# Patient Record
Sex: MALE | Race: WHITE | NOT HISPANIC OR LATINO | Employment: OTHER | URBAN - METROPOLITAN AREA
[De-identification: names, ages, dates, MRNs, and addresses within clinical notes are randomized per-mention and may not be internally consistent; named-entity substitution may affect disease eponyms.]

---

## 2017-01-04 ENCOUNTER — OFFICE VISIT (OUTPATIENT)
Dept: FAMILY MEDICINE CLINIC | Facility: CLINIC | Age: 72
End: 2017-01-04

## 2017-01-04 VITALS
HEART RATE: 72 BPM | HEIGHT: 73 IN | BODY MASS INDEX: 32.74 KG/M2 | DIASTOLIC BLOOD PRESSURE: 82 MMHG | TEMPERATURE: 98.4 F | SYSTOLIC BLOOD PRESSURE: 122 MMHG | WEIGHT: 247 LBS | OXYGEN SATURATION: 98 %

## 2017-01-04 DIAGNOSIS — I10 ESSENTIAL HYPERTENSION: ICD-10-CM

## 2017-01-04 DIAGNOSIS — E78.2 MIXED HYPERLIPIDEMIA: Primary | ICD-10-CM

## 2017-01-04 DIAGNOSIS — M54.41 CHRONIC BILATERAL LOW BACK PAIN WITH RIGHT-SIDED SCIATICA: ICD-10-CM

## 2017-01-04 DIAGNOSIS — M15.9 PRIMARY OSTEOARTHRITIS INVOLVING MULTIPLE JOINTS: ICD-10-CM

## 2017-01-04 DIAGNOSIS — M10.00 IDIOPATHIC GOUT, UNSPECIFIED CHRONICITY, UNSPECIFIED SITE: ICD-10-CM

## 2017-01-04 DIAGNOSIS — G89.29 CHRONIC BILATERAL LOW BACK PAIN WITH RIGHT-SIDED SCIATICA: ICD-10-CM

## 2017-01-04 DIAGNOSIS — M25.50 ARTHRALGIA, UNSPECIFIED JOINT: ICD-10-CM

## 2017-01-04 DIAGNOSIS — M79.10 MYALGIA: ICD-10-CM

## 2017-01-04 DIAGNOSIS — G47.00 INSOMNIA, UNSPECIFIED TYPE: ICD-10-CM

## 2017-01-04 PROCEDURE — 99214 OFFICE O/P EST MOD 30 MIN: CPT | Performed by: FAMILY MEDICINE

## 2017-01-04 RX ORDER — INFLUENZA A VIRUS A/MICHIGAN/45/2015 X-275 (H1N1) ANTIGEN (FORMALDEHYDE INACTIVATED), INFLUENZA A VIRUS A/SINGAPORE/INFIMH-16-0019/2016 IVR-186 (H3N2) ANTIGEN (FORMALDEHYDE INACTIVATED), AND INFLUENZA B VIRUS B/MARYLAND/15/2016 BX-69A (A B/COLORADO/6/2017-LIKE VIRUS) ANTIGEN (FORMALDEHYDE INACTIVATED) 60; 60; 60 UG/.5ML; UG/.5ML; UG/.5ML
INJECTION, SUSPENSION INTRAMUSCULAR
Refills: 0 | COMMUNITY
Start: 2016-10-20 | End: 2021-07-15

## 2017-01-04 RX ORDER — HYDROCODONE BITARTRATE AND ACETAMINOPHEN 7.5; 325 MG/1; MG/1
1 TABLET ORAL EVERY 6 HOURS PRN
Qty: 90 TABLET | Refills: 0 | Status: SHIPPED | OUTPATIENT
Start: 2017-01-04 | End: 2017-06-26 | Stop reason: SDUPTHER

## 2017-01-04 RX ORDER — TEMAZEPAM 15 MG/1
15 CAPSULE ORAL NIGHTLY PRN
Qty: 30 CAPSULE | Refills: 2 | Status: SHIPPED | OUTPATIENT
Start: 2017-01-04 | End: 2017-06-26 | Stop reason: SDUPTHER

## 2017-01-04 RX ORDER — MESALAMINE 1.2 G/1
1 TABLET, DELAYED RELEASE ORAL 2 TIMES DAILY
Refills: 3 | COMMUNITY
Start: 2016-12-19 | End: 2023-01-18

## 2017-01-04 RX ORDER — ALLOPURINOL 300 MG/1
300 TABLET ORAL DAILY
Qty: 90 TABLET | Refills: 3 | Status: SHIPPED | OUTPATIENT
Start: 2017-01-04 | End: 2017-06-26 | Stop reason: SDUPTHER

## 2017-01-04 RX ORDER — PREDNISONE 20 MG/1
20 TABLET ORAL DAILY
Qty: 30 TABLET | Refills: 2 | Status: SHIPPED | OUTPATIENT
Start: 2017-01-04 | End: 2017-06-26 | Stop reason: SDUPTHER

## 2017-01-04 RX ORDER — OMEPRAZOLE 40 MG/1
40 CAPSULE, DELAYED RELEASE ORAL DAILY
Qty: 90 CAPSULE | Refills: 3 | Status: SHIPPED | OUTPATIENT
Start: 2017-01-04 | End: 2017-06-26 | Stop reason: SDUPTHER

## 2017-01-04 RX ORDER — LOSARTAN POTASSIUM 100 MG/1
100 TABLET ORAL DAILY
Qty: 90 TABLET | Refills: 3 | Status: SHIPPED | OUTPATIENT
Start: 2017-01-04 | End: 2017-06-26 | Stop reason: SDUPTHER

## 2017-01-04 RX ORDER — DOXAZOSIN MESYLATE 4 MG/1
4 TABLET ORAL NIGHTLY
Qty: 90 TABLET | Refills: 3 | Status: SHIPPED | OUTPATIENT
Start: 2017-01-04 | End: 2017-06-26 | Stop reason: SDUPTHER

## 2017-01-04 RX ORDER — METOPROLOL SUCCINATE 100 MG/1
100 TABLET, EXTENDED RELEASE ORAL DAILY
Qty: 90 TABLET | Refills: 3 | Status: SHIPPED | OUTPATIENT
Start: 2017-01-04 | End: 2017-06-26 | Stop reason: SDUPTHER

## 2017-01-04 NOTE — PROGRESS NOTES
Subjective   Papito Salinas is a 71 y.o. male    HPI Comments: Patient presents for 6 month checkup is related to chronic gout, hypertension, chronic back pain secondary to lumbar spondylosis, insomnia and GERD.  He is having increased joint pain with occasional swelling in various joints particularly left ankle and right elbow.  He has not noted any redness or warmth in the joint areas.  He has not been doing any significant physical activity to precipitate the joint pains.  He is due for refills on all of his medications.  I have recommended we do some blood work when he is fasting and also include an arthritis panel with his lab testing.  He has not had any other new symptoms or complaints.    Arthritis   He complains of joint swelling. Pertinent negatives include no diarrhea, fatigue or rash.   Gout   Associated symptoms include arthralgias, joint swelling and myalgias. Pertinent negatives include no chest pain, coughing, fatigue, headaches, nausea, numbness, rash, vomiting or weakness.   Joint Swelling   Associated symptoms include arthralgias, joint swelling and myalgias. Pertinent negatives include no chest pain, coughing, fatigue, headaches, nausea, numbness, rash, vomiting or weakness.   Insomnia   Associated symptoms include arthralgias, joint swelling and myalgias. Pertinent negatives include no chest pain, coughing, fatigue, headaches, nausea, numbness, rash, vomiting or weakness.   Hypertension   Pertinent negatives include no chest pain, headaches, palpitations or shortness of breath.   Back Pain   Pertinent negatives include no chest pain, headaches, numbness or weakness.       The following portions of the patient's history were reviewed and updated as appropriate: allergies, current medications, past social history and problem list    Review of Systems   Constitutional: Negative.  Negative for fatigue and unexpected weight change.   Respiratory: Negative.  Negative for cough, chest tightness and  shortness of breath.    Cardiovascular: Negative for chest pain, palpitations and leg swelling.   Gastrointestinal: Negative.  Negative for constipation, diarrhea, nausea and vomiting.   Genitourinary: Negative.    Musculoskeletal: Positive for arthralgias, arthritis, back pain, gout, joint swelling and myalgias. Negative for gait problem.   Skin: Negative for color change and rash.   Neurological: Negative for dizziness, tremors, syncope, weakness, light-headedness, numbness and headaches.   Hematological: Negative for adenopathy. Does not bruise/bleed easily.   Psychiatric/Behavioral: Positive for sleep disturbance. Negative for agitation, behavioral problems, confusion, decreased concentration, dysphoric mood and hallucinations. The patient has insomnia. The patient is not nervous/anxious and is not hyperactive.        Objective     Vitals:    01/04/17 1133   BP: 122/82   Pulse: 72   Temp: 98.4 °F (36.9 °C)   SpO2: 98%       Physical Exam   Constitutional: He is oriented to person, place, and time. He appears well-developed and well-nourished. No distress.   HENT:   Head: Normocephalic and atraumatic.   Eyes: Conjunctivae are normal. Pupils are equal, round, and reactive to light.   Neck: Normal range of motion. Neck supple. No JVD present. No thyromegaly present.   Cardiovascular: Normal rate, regular rhythm, normal heart sounds, intact distal pulses and normal pulses.    No murmur heard.  Pulmonary/Chest: Effort normal and breath sounds normal. No respiratory distress.   Abdominal: Soft. Bowel sounds are normal. There is no hepatosplenomegaly. There is no tenderness.   Musculoskeletal: He exhibits tenderness. He exhibits no edema or deformity.        Lumbar back: He exhibits decreased range of motion, tenderness, bony tenderness and pain. He exhibits no swelling, no deformity and no spasm.   Neurological: He is alert and oriented to person, place, and time. He has normal reflexes. Coordination normal.   Skin:  Skin is warm and dry. He is not diaphoretic.   Psychiatric: He has a normal mood and affect. His behavior is normal. Judgment and thought content normal. Cognition and memory are normal. He is attentive.   Nursing note and vitals reviewed.      Assessment/Plan   Problem List Items Addressed This Visit        Other    Gout    Relevant Medications    allopurinol (ZYLOPRIM) 300 MG tablet    predniSONE (DELTASONE) 20 MG tablet      Other Visit Diagnoses     Mixed hyperlipidemia    -  Primary    Relevant Orders    Comprehensive Metabolic Panel    Lipid Panel    Essential hypertension        Relevant Medications    metoprolol succinate XL (TOPROL-XL) 100 MG 24 hr tablet    losartan (COZAAR) 100 MG tablet    doxazosin (CARDURA) 4 MG tablet    Other Relevant Orders    Comprehensive Metabolic Panel    Primary osteoarthritis involving multiple joints        Chronic bilateral low back pain with right-sided sciatica        Relevant Medications    HYDROcodone-acetaminophen (NORCO) 7.5-325 MG per tablet    Myalgia        Relevant Orders    CBC & Differential    C-reactive Protein    Rheumatoid Factor    Uric Acid    Sedimentation Rate    FAYE    Arthralgia, unspecified joint        Relevant Orders    CBC & Differential    C-reactive Protein    Rheumatoid Factor    Uric Acid    Sedimentation Rate    FAYE    Insomnia, unspecified type        Relevant Medications    temazepam (RESTORIL) 15 MG capsule

## 2017-01-05 ENCOUNTER — APPOINTMENT (OUTPATIENT)
Dept: LAB | Facility: HOSPITAL | Age: 72
End: 2017-01-05

## 2017-01-05 LAB
ALBUMIN SERPL-MCNC: 4.1 G/DL (ref 3.2–4.8)
ALBUMIN/GLOB SERPL: 1.2 G/DL (ref 1.5–2.5)
ALP SERPL-CCNC: 104 U/L (ref 25–100)
ALT SERPL W P-5'-P-CCNC: 28 U/L (ref 7–40)
ANION GAP SERPL CALCULATED.3IONS-SCNC: 13 MMOL/L (ref 3–11)
ARTICHOKE IGE QN: 96 MG/DL (ref 0–130)
AST SERPL-CCNC: 29 U/L (ref 0–33)
BASOPHILS # BLD AUTO: 0.01 10*3/MM3 (ref 0–0.2)
BASOPHILS NFR BLD AUTO: 0.1 % (ref 0–1)
BILIRUB SERPL-MCNC: 0.7 MG/DL (ref 0.3–1.2)
BUN BLD-MCNC: 14 MG/DL (ref 9–23)
BUN/CREAT SERPL: 11.7 (ref 7–25)
CALCIUM SPEC-SCNC: 10.1 MG/DL (ref 8.7–10.4)
CHLORIDE SERPL-SCNC: 105 MMOL/L (ref 99–109)
CHOLEST SERPL-MCNC: 169 MG/DL (ref 0–200)
CO2 SERPL-SCNC: 26 MMOL/L (ref 20–31)
CREAT BLD-MCNC: 1.2 MG/DL (ref 0.6–1.3)
CRP SERPL-MCNC: 30.8 MG/DL (ref 0–10)
DEPRECATED RDW RBC AUTO: 42.4 FL (ref 37–54)
EOSINOPHIL # BLD AUTO: 0.18 10*3/MM3 (ref 0.1–0.3)
EOSINOPHIL NFR BLD AUTO: 2.6 % (ref 0–3)
ERYTHROCYTE [DISTWIDTH] IN BLOOD BY AUTOMATED COUNT: 12.6 % (ref 11.3–14.5)
ERYTHROCYTE [SEDIMENTATION RATE] IN BLOOD: 30 MM/HR (ref 0–20)
GFR SERPL CREATININE-BSD FRML MDRD: 60 ML/MIN/1.73
GLOBULIN UR ELPH-MCNC: 3.3 GM/DL
GLUCOSE BLD-MCNC: 98 MG/DL (ref 70–100)
HCT VFR BLD AUTO: 42.5 % (ref 38.9–50.9)
HDLC SERPL-MCNC: 35 MG/DL (ref 40–60)
HGB BLD-MCNC: 14.7 G/DL (ref 13.1–17.5)
IMM GRANULOCYTES # BLD: 0.01 10*3/MM3 (ref 0–0.03)
IMM GRANULOCYTES NFR BLD: 0.1 % (ref 0–0.6)
LYMPHOCYTES # BLD AUTO: 1.16 10*3/MM3 (ref 0.6–4.8)
LYMPHOCYTES NFR BLD AUTO: 16.7 % (ref 24–44)
MCH RBC QN AUTO: 31.9 PG (ref 27–31)
MCHC RBC AUTO-ENTMCNC: 34.6 G/DL (ref 32–36)
MCV RBC AUTO: 92.2 FL (ref 80–99)
MONOCYTES # BLD AUTO: 0.55 10*3/MM3 (ref 0–1)
MONOCYTES NFR BLD AUTO: 7.9 % (ref 0–12)
NEUTROPHILS # BLD AUTO: 5.03 10*3/MM3 (ref 1.5–8.3)
NEUTROPHILS NFR BLD AUTO: 72.6 % (ref 41–71)
PLATELET # BLD AUTO: 180 10*3/MM3 (ref 150–450)
PMV BLD AUTO: 10.8 FL (ref 6–12)
POTASSIUM BLD-SCNC: 4.9 MMOL/L (ref 3.5–5.5)
PROT SERPL-MCNC: 7.4 G/DL (ref 5.7–8.2)
RBC # BLD AUTO: 4.61 10*6/MM3 (ref 4.2–5.76)
RHEUMATOID FACT SERPL-ACNC: NEGATIVE [IU]/ML
SODIUM BLD-SCNC: 144 MMOL/L (ref 132–146)
TRIGL SERPL-MCNC: 175 MG/DL (ref 0–150)
URATE SERPL-MCNC: 4.1 MG/DL (ref 3.7–9.2)
WBC NRBC COR # BLD: 6.94 10*3/MM3 (ref 3.5–10.8)

## 2017-01-05 PROCEDURE — 84550 ASSAY OF BLOOD/URIC ACID: CPT | Performed by: FAMILY MEDICINE

## 2017-01-05 PROCEDURE — 86140 C-REACTIVE PROTEIN: CPT | Performed by: FAMILY MEDICINE

## 2017-01-05 PROCEDURE — 86431 RHEUMATOID FACTOR QUANT: CPT | Performed by: FAMILY MEDICINE

## 2017-01-05 PROCEDURE — 80061 LIPID PANEL: CPT | Performed by: FAMILY MEDICINE

## 2017-01-05 PROCEDURE — 86038 ANTINUCLEAR ANTIBODIES: CPT | Performed by: FAMILY MEDICINE

## 2017-01-05 PROCEDURE — 85025 COMPLETE CBC W/AUTO DIFF WBC: CPT | Performed by: FAMILY MEDICINE

## 2017-01-05 PROCEDURE — 36415 COLL VENOUS BLD VENIPUNCTURE: CPT | Performed by: FAMILY MEDICINE

## 2017-01-05 PROCEDURE — 80053 COMPREHEN METABOLIC PANEL: CPT | Performed by: FAMILY MEDICINE

## 2017-01-05 PROCEDURE — 85652 RBC SED RATE AUTOMATED: CPT | Performed by: FAMILY MEDICINE

## 2017-01-06 LAB — ANA SER QL: NEGATIVE

## 2017-06-26 ENCOUNTER — OFFICE VISIT (OUTPATIENT)
Dept: FAMILY MEDICINE CLINIC | Facility: CLINIC | Age: 72
End: 2017-06-26

## 2017-06-26 VITALS
HEART RATE: 69 BPM | HEIGHT: 73 IN | TEMPERATURE: 98.2 F | DIASTOLIC BLOOD PRESSURE: 78 MMHG | WEIGHT: 250 LBS | BODY MASS INDEX: 33.13 KG/M2 | OXYGEN SATURATION: 98 % | SYSTOLIC BLOOD PRESSURE: 130 MMHG

## 2017-06-26 DIAGNOSIS — M54.41 CHRONIC BILATERAL LOW BACK PAIN WITH RIGHT-SIDED SCIATICA: Primary | ICD-10-CM

## 2017-06-26 DIAGNOSIS — G47.00 INSOMNIA, UNSPECIFIED TYPE: ICD-10-CM

## 2017-06-26 DIAGNOSIS — Z12.5 PROSTATE CANCER SCREENING: ICD-10-CM

## 2017-06-26 DIAGNOSIS — I10 ESSENTIAL HYPERTENSION: ICD-10-CM

## 2017-06-26 DIAGNOSIS — M15.9 GENERALIZED OSTEOARTHROSIS, INVOLVING MULTIPLE SITES: ICD-10-CM

## 2017-06-26 DIAGNOSIS — Z11.59 NEED FOR HEPATITIS C SCREENING TEST: ICD-10-CM

## 2017-06-26 DIAGNOSIS — G89.29 CHRONIC BILATERAL LOW BACK PAIN WITH RIGHT-SIDED SCIATICA: Primary | ICD-10-CM

## 2017-06-26 DIAGNOSIS — Z51.81 MEDICATION MONITORING ENCOUNTER: ICD-10-CM

## 2017-06-26 DIAGNOSIS — E78.2 MIXED HYPERLIPIDEMIA: ICD-10-CM

## 2017-06-26 DIAGNOSIS — M10.00 IDIOPATHIC GOUT, UNSPECIFIED CHRONICITY, UNSPECIFIED SITE: ICD-10-CM

## 2017-06-26 PROCEDURE — 99214 OFFICE O/P EST MOD 30 MIN: CPT | Performed by: FAMILY MEDICINE

## 2017-06-26 RX ORDER — PREDNISONE 20 MG/1
20 TABLET ORAL DAILY
Qty: 30 TABLET | Refills: 2 | Status: SHIPPED | OUTPATIENT
Start: 2017-06-26 | End: 2018-01-04 | Stop reason: SDUPTHER

## 2017-06-26 RX ORDER — OMEPRAZOLE 40 MG/1
40 CAPSULE, DELAYED RELEASE ORAL DAILY
Qty: 90 CAPSULE | Refills: 3 | Status: SHIPPED | OUTPATIENT
Start: 2017-06-26 | End: 2018-06-11 | Stop reason: SDUPTHER

## 2017-06-26 RX ORDER — ALLOPURINOL 300 MG/1
300 TABLET ORAL DAILY
Qty: 90 TABLET | Refills: 3 | Status: SHIPPED | OUTPATIENT
Start: 2017-06-26 | End: 2018-06-11 | Stop reason: SDUPTHER

## 2017-06-26 RX ORDER — LOSARTAN POTASSIUM 100 MG/1
100 TABLET ORAL DAILY
Qty: 90 TABLET | Refills: 3 | Status: SHIPPED | OUTPATIENT
Start: 2017-06-26 | End: 2018-02-05 | Stop reason: SDUPTHER

## 2017-06-26 RX ORDER — TEMAZEPAM 15 MG/1
15 CAPSULE ORAL NIGHTLY PRN
Qty: 30 CAPSULE | Refills: 2 | Status: SHIPPED | OUTPATIENT
Start: 2017-06-26 | End: 2018-01-04 | Stop reason: SDUPTHER

## 2017-06-26 RX ORDER — DOXAZOSIN MESYLATE 4 MG/1
4 TABLET ORAL NIGHTLY
Qty: 90 TABLET | Refills: 3 | Status: SHIPPED | OUTPATIENT
Start: 2017-06-26 | End: 2018-06-11 | Stop reason: SDUPTHER

## 2017-06-26 RX ORDER — METOPROLOL SUCCINATE 100 MG/1
100 TABLET, EXTENDED RELEASE ORAL DAILY
Qty: 90 TABLET | Refills: 3 | Status: SHIPPED | OUTPATIENT
Start: 2017-06-26 | End: 2018-06-11 | Stop reason: SDUPTHER

## 2017-06-26 RX ORDER — HYDROCODONE BITARTRATE AND ACETAMINOPHEN 7.5; 325 MG/1; MG/1
1 TABLET ORAL EVERY 6 HOURS PRN
Qty: 90 TABLET | Refills: 0 | Status: SHIPPED | OUTPATIENT
Start: 2017-06-26 | End: 2018-01-04 | Stop reason: SDUPTHER

## 2017-06-26 RX ORDER — DICLOFENAC SODIUM 75 MG/1
75 TABLET, DELAYED RELEASE ORAL 2 TIMES DAILY
Qty: 60 TABLET | Refills: 11 | Status: SHIPPED | OUTPATIENT
Start: 2017-06-26 | End: 2018-01-04

## 2017-06-26 NOTE — PROGRESS NOTES
Subjective   Papito Salinas is a 71 y.o. male    HPI Comments: Patient presents for 6 month recheck regarding hypertension, osteoarthritis, gout, GERD, insomnia and BPH.  He reports significant flareup of his arthritis particularly in his right knee and lower back.  He uses periodic prednisone.  We discussed adding a regular dose of NSAID to see if this will help.  He is advised to watch for GI side effects.  He is not fasting today for labs and is getting ready to go on vacation so we will do them in the future.  We discussed health maintenance issues including shingles vaccine, flu vaccine and pneumonia vaccine.  I've given him a written prescription for the shingles vaccine to get it is pharmacy and we can do his flu and pneumonia this fall.  Other than his arthritis symptoms of knee pain and back pain he has been doing well and has no other acute complaints.  He does need all of his prescriptions refilled.    Hypertension   Pertinent negatives include no chest pain, headaches, palpitations or shortness of breath.   Knee Pain    Pertinent negatives include no numbness.   Back Pain   Pertinent negatives include no abdominal pain, chest pain, dysuria, fever, headaches, numbness or weakness.       The following portions of the patient's history were reviewed and updated as appropriate: allergies, current medications, past social history and problem list    Review of Systems   Constitutional: Negative.  Negative for appetite change, chills, fatigue, fever and unexpected weight change.   Respiratory: Negative.  Negative for cough, chest tightness, shortness of breath and wheezing.    Cardiovascular: Negative for chest pain, palpitations and leg swelling.   Gastrointestinal: Negative.  Negative for abdominal distention, abdominal pain, diarrhea, nausea and vomiting.        Patient experiencing heartburn/acid reflux     Endocrine: Negative for polydipsia, polyphagia and polyuria.   Genitourinary: Negative.  Negative  for dysuria, frequency and urgency.   Musculoskeletal: Positive for back pain. Negative for arthralgias, gait problem and myalgias.   Skin: Negative for color change and rash.   Neurological: Negative for dizziness, tremors, syncope, weakness, numbness and headaches.   Hematological: Negative for adenopathy. Does not bruise/bleed easily.       Objective     Vitals:    06/26/17 1134   BP: 130/78   Pulse: 69   Temp: 98.2 °F (36.8 °C)   SpO2: 98%       Physical Exam   Constitutional: He is oriented to person, place, and time. He appears well-developed and well-nourished.   HENT:   Head: Normocephalic.   Mouth/Throat: Oropharynx is clear and moist.   Eyes: Conjunctivae are normal. Pupils are equal, round, and reactive to light.   Neck: Neck supple. No JVD present. No thyromegaly present.   Cardiovascular: Normal rate, regular rhythm, normal heart sounds, intact distal pulses and normal pulses.    No murmur heard.  Pulmonary/Chest: Effort normal and breath sounds normal. No respiratory distress.   Abdominal: Soft. Bowel sounds are normal. There is no hepatosplenomegaly. There is no tenderness.   Musculoskeletal: He exhibits no edema.        Lumbar back: He exhibits decreased range of motion, tenderness, bony tenderness and pain. He exhibits no swelling, no deformity and no spasm.   Lymphadenopathy:     He has no cervical adenopathy.   Neurological: He is alert and oriented to person, place, and time. He has normal reflexes.   Skin: Skin is warm and dry. No rash noted.   Psychiatric: He has a normal mood and affect. His behavior is normal.   Nursing note and vitals reviewed.      Assessment/Plan   Problem List Items Addressed This Visit        Musculoskeletal and Integument    Generalized osteoarthrosis, involving multiple sites       Other    Gout    Relevant Medications    predniSONE (DELTASONE) 20 MG tablet    allopurinol (ZYLOPRIM) 300 MG tablet    Other Relevant Orders    CBC (No Diff)      Other Visit Diagnoses      Chronic bilateral low back pain with right-sided sciatica    -  Primary    Relevant Medications    HYDROcodone-acetaminophen (NORCO) 7.5-325 MG per tablet    Insomnia, unspecified type        Relevant Medications    temazepam (RESTORIL) 15 MG capsule    Essential hypertension        Relevant Medications    losartan (COZAAR) 100 MG tablet    doxazosin (CARDURA) 4 MG tablet    metoprolol succinate XL (TOPROL-XL) 100 MG 24 hr tablet    Other Relevant Orders    Comprehensive Metabolic Panel    Prostate cancer screening        Relevant Orders    PSA Screen    Need for hepatitis C screening test        Relevant Orders    Hepatitis C Antibody    Mixed hyperlipidemia        Relevant Orders    Comprehensive Metabolic Panel    Lipid Panel    Medication monitoring encounter        Relevant Orders    CBC (No Diff)

## 2017-08-14 ENCOUNTER — LAB (OUTPATIENT)
Dept: LAB | Facility: HOSPITAL | Age: 72
End: 2017-08-14

## 2017-08-14 DIAGNOSIS — E78.2 MIXED HYPERLIPIDEMIA: ICD-10-CM

## 2017-08-14 DIAGNOSIS — Z11.59 NEED FOR HEPATITIS C SCREENING TEST: ICD-10-CM

## 2017-08-14 DIAGNOSIS — Z12.5 PROSTATE CANCER SCREENING: ICD-10-CM

## 2017-08-14 DIAGNOSIS — Z51.81 MEDICATION MONITORING ENCOUNTER: ICD-10-CM

## 2017-08-14 DIAGNOSIS — I10 ESSENTIAL HYPERTENSION: ICD-10-CM

## 2017-08-14 DIAGNOSIS — M10.00 IDIOPATHIC GOUT, UNSPECIFIED CHRONICITY, UNSPECIFIED SITE: ICD-10-CM

## 2017-08-14 LAB
ALBUMIN SERPL-MCNC: 3.8 G/DL (ref 3.2–4.8)
ALBUMIN/GLOB SERPL: 1.5 G/DL (ref 1.5–2.5)
ALP SERPL-CCNC: 77 U/L (ref 25–100)
ALT SERPL W P-5'-P-CCNC: 33 U/L (ref 7–40)
ANION GAP SERPL CALCULATED.3IONS-SCNC: 6 MMOL/L (ref 3–11)
ARTICHOKE IGE QN: 104 MG/DL (ref 0–130)
AST SERPL-CCNC: 25 U/L (ref 0–33)
BILIRUB SERPL-MCNC: 0.6 MG/DL (ref 0.3–1.2)
BUN BLD-MCNC: 20 MG/DL (ref 9–23)
BUN/CREAT SERPL: 18.2 (ref 7–25)
CALCIUM SPEC-SCNC: 9.1 MG/DL (ref 8.7–10.4)
CHLORIDE SERPL-SCNC: 106 MMOL/L (ref 99–109)
CHOLEST SERPL-MCNC: 183 MG/DL (ref 0–200)
CO2 SERPL-SCNC: 30 MMOL/L (ref 20–31)
CREAT BLD-MCNC: 1.1 MG/DL (ref 0.6–1.3)
DEPRECATED RDW RBC AUTO: 47.2 FL (ref 37–54)
ERYTHROCYTE [DISTWIDTH] IN BLOOD BY AUTOMATED COUNT: 13.7 % (ref 11.3–14.5)
GFR SERPL CREATININE-BSD FRML MDRD: 66 ML/MIN/1.73
GLOBULIN UR ELPH-MCNC: 2.6 GM/DL
GLUCOSE BLD-MCNC: 93 MG/DL (ref 70–100)
HCT VFR BLD AUTO: 43.2 % (ref 38.9–50.9)
HCV AB SER DONR QL: REACTIVE
HDLC SERPL-MCNC: 44 MG/DL (ref 40–60)
HGB BLD-MCNC: 14.1 G/DL (ref 13.1–17.5)
MCH RBC QN AUTO: 31.1 PG (ref 27–31)
MCHC RBC AUTO-ENTMCNC: 32.6 G/DL (ref 32–36)
MCV RBC AUTO: 95.2 FL (ref 80–99)
PLATELET # BLD AUTO: 161 10*3/MM3 (ref 150–450)
PMV BLD AUTO: 11.1 FL (ref 6–12)
POTASSIUM BLD-SCNC: 4.5 MMOL/L (ref 3.5–5.5)
PROT SERPL-MCNC: 6.4 G/DL (ref 5.7–8.2)
PSA SERPL-MCNC: 0.92 NG/ML (ref 0–4)
RBC # BLD AUTO: 4.54 10*6/MM3 (ref 4.2–5.76)
SODIUM BLD-SCNC: 142 MMOL/L (ref 132–146)
TRIGL SERPL-MCNC: 228 MG/DL (ref 0–150)
WBC NRBC COR # BLD: 8.28 10*3/MM3 (ref 3.5–10.8)

## 2017-08-14 PROCEDURE — 80061 LIPID PANEL: CPT | Performed by: FAMILY MEDICINE

## 2017-08-14 PROCEDURE — 85027 COMPLETE CBC AUTOMATED: CPT | Performed by: FAMILY MEDICINE

## 2017-08-14 PROCEDURE — 36415 COLL VENOUS BLD VENIPUNCTURE: CPT

## 2017-08-14 PROCEDURE — 87522 HEPATITIS C REVRS TRNSCRPJ: CPT | Performed by: FAMILY MEDICINE

## 2017-08-14 PROCEDURE — 86803 HEPATITIS C AB TEST: CPT | Performed by: FAMILY MEDICINE

## 2017-08-14 PROCEDURE — G0103 PSA SCREENING: HCPCS | Performed by: FAMILY MEDICINE

## 2017-08-14 PROCEDURE — 80053 COMPREHEN METABOLIC PANEL: CPT | Performed by: FAMILY MEDICINE

## 2017-08-16 LAB
HCV RNA SERPL NAA+PROBE-ACNC: NORMAL IU/ML
TEST INFORMATION: NORMAL

## 2018-01-04 ENCOUNTER — OFFICE VISIT (OUTPATIENT)
Dept: FAMILY MEDICINE CLINIC | Facility: CLINIC | Age: 73
End: 2018-01-04

## 2018-01-04 VITALS
OXYGEN SATURATION: 98 % | TEMPERATURE: 98 F | HEART RATE: 83 BPM | DIASTOLIC BLOOD PRESSURE: 80 MMHG | WEIGHT: 250 LBS | SYSTOLIC BLOOD PRESSURE: 120 MMHG | HEIGHT: 73 IN | BODY MASS INDEX: 33.13 KG/M2

## 2018-01-04 DIAGNOSIS — M10.00 IDIOPATHIC GOUT, UNSPECIFIED CHRONICITY, UNSPECIFIED SITE: ICD-10-CM

## 2018-01-04 DIAGNOSIS — R20.2 NUMBNESS AND TINGLING OF BOTH FEET: ICD-10-CM

## 2018-01-04 DIAGNOSIS — M54.41 CHRONIC BILATERAL LOW BACK PAIN WITH RIGHT-SIDED SCIATICA: Primary | ICD-10-CM

## 2018-01-04 DIAGNOSIS — G47.00 INSOMNIA, UNSPECIFIED TYPE: ICD-10-CM

## 2018-01-04 DIAGNOSIS — R20.0 NUMBNESS AND TINGLING OF BOTH FEET: ICD-10-CM

## 2018-01-04 DIAGNOSIS — J01.00 ACUTE NON-RECURRENT MAXILLARY SINUSITIS: ICD-10-CM

## 2018-01-04 DIAGNOSIS — G89.29 CHRONIC BILATERAL LOW BACK PAIN WITH RIGHT-SIDED SCIATICA: Primary | ICD-10-CM

## 2018-01-04 DIAGNOSIS — M15.9 GENERALIZED OSTEOARTHROSIS, INVOLVING MULTIPLE SITES: ICD-10-CM

## 2018-01-04 PROCEDURE — 99214 OFFICE O/P EST MOD 30 MIN: CPT | Performed by: FAMILY MEDICINE

## 2018-01-04 RX ORDER — HYDROCODONE BITARTRATE AND ACETAMINOPHEN 7.5; 325 MG/1; MG/1
1 TABLET ORAL EVERY 6 HOURS PRN
Qty: 90 TABLET | Refills: 0 | Status: SHIPPED | OUTPATIENT
Start: 2018-01-04 | End: 2018-06-11 | Stop reason: SDUPTHER

## 2018-01-04 RX ORDER — TEMAZEPAM 15 MG/1
15 CAPSULE ORAL NIGHTLY PRN
Qty: 30 CAPSULE | Refills: 5 | Status: SHIPPED | OUTPATIENT
Start: 2018-01-04 | End: 2018-06-11 | Stop reason: SDUPTHER

## 2018-01-04 RX ORDER — GABAPENTIN 100 MG/1
CAPSULE ORAL
Qty: 60 CAPSULE | Refills: 5 | Status: SHIPPED | OUTPATIENT
Start: 2018-01-04 | End: 2018-06-11 | Stop reason: SDUPTHER

## 2018-01-04 RX ORDER — PREDNISONE 20 MG/1
20 TABLET ORAL DAILY
Qty: 30 TABLET | Refills: 2 | Status: SHIPPED | OUTPATIENT
Start: 2018-01-04 | End: 2019-01-29 | Stop reason: SDUPTHER

## 2018-01-04 RX ORDER — AZITHROMYCIN 250 MG/1
TABLET, FILM COATED ORAL
Qty: 6 TABLET | Refills: 0 | Status: SHIPPED | OUTPATIENT
Start: 2018-01-04 | End: 2019-01-11

## 2018-01-04 NOTE — PROGRESS NOTES
Subjective   Papito Salinas is a 72 y.o. male    HPI Comments: Patient presents for recheck regarding chronic back pain, osteoarthritis of the knees and insomnia.  He is due for refills of his medications.  Tejinder is reviewed and is appropriate.  He has a new complaint of worsening numbness in both feet that is intermittent but has become more frequent.  He is also complaining of nasal congestion with rhinorrhea.  Initially his drainage was clear it is now dark yellow and he is also coughing up some yellow sputum.  He denies fever, chills, myalgias or arthralgias.  He had lab work last summer.  Trial of NSAIDs for his arthritis caused a flare in his colitis.  He is to avoid NSAIDs in the future.    Back Pain   Associated symptoms include numbness. Pertinent negatives include no chest pain, fever, headaches or weakness.   Knee Pain    Associated symptoms include numbness.   Cough   Associated symptoms include ear pain, postnasal drip and rhinorrhea. Pertinent negatives include no chest pain, chills, fever, headaches, myalgias, rash, sore throat or shortness of breath.   Insomnia   Associated symptoms include arthralgias, congestion, coughing and numbness. Pertinent negatives include no chest pain, chills, fatigue, fever, headaches, myalgias, nausea, rash, sore throat or weakness.       The following portions of the patient's history were reviewed and updated as appropriate: allergies, current medications, past social history and problem list    Review of Systems   Constitutional: Negative.  Negative for chills, fatigue, fever and unexpected weight change.   HENT: Positive for congestion, ear pain, postnasal drip, rhinorrhea and sinus pressure. Negative for sore throat.    Eyes: Positive for pain.   Respiratory: Positive for cough. Negative for chest tightness and shortness of breath.    Cardiovascular: Negative for chest pain, palpitations and leg swelling.   Gastrointestinal: Negative.  Negative for nausea.    Genitourinary: Negative.    Musculoskeletal: Positive for arthralgias and back pain. Negative for gait problem and myalgias.   Skin: Negative for color change and rash.   Neurological: Positive for numbness. Negative for dizziness, tremors, syncope, weakness and headaches.   Hematological: Negative for adenopathy. Does not bruise/bleed easily.   Psychiatric/Behavioral: Negative for behavioral problems and dysphoric mood. The patient has insomnia. The patient is not nervous/anxious.        Objective     Vitals:    01/04/18 1101   BP: 120/80   Pulse: 83   Temp: 98 °F (36.7 °C)   SpO2: 98%       Physical Exam   Constitutional: He is oriented to person, place, and time. He appears well-developed and well-nourished.   HENT:   Head: Normocephalic and atraumatic.   Right Ear: Tympanic membrane and ear canal normal.   Left Ear: Tympanic membrane and ear canal normal.   Nose: Mucosal edema, rhinorrhea and sinus tenderness present. Right sinus exhibits maxillary sinus tenderness and frontal sinus tenderness. Left sinus exhibits maxillary sinus tenderness and frontal sinus tenderness.   Mouth/Throat: Oropharynx is clear and moist. No oropharyngeal exudate.   Eyes: Pupils are equal, round, and reactive to light.   Neck: No JVD present.   Cardiovascular: Normal rate, regular rhythm, normal heart sounds, intact distal pulses and normal pulses.    No murmur heard.  Pulmonary/Chest: Effort normal and breath sounds normal. No respiratory distress.   Abdominal: Soft. Bowel sounds are normal. There is no hepatosplenomegaly. There is no tenderness.   Musculoskeletal: He exhibits tenderness. He exhibits no edema or deformity.        Lumbar back: He exhibits decreased range of motion, tenderness, bony tenderness and pain. He exhibits no swelling, no deformity and no spasm.   Neurological: He is alert and oriented to person, place, and time. He has normal reflexes.   Skin: Skin is warm and dry.   Psychiatric: He has a normal mood and  affect. His behavior is normal.   Nursing note and vitals reviewed.      Assessment/Plan   Problem List Items Addressed This Visit        Musculoskeletal and Integument    Generalized osteoarthrosis, involving multiple sites    Relevant Medications    HYDROcodone-acetaminophen (NORCO) 7.5-325 MG per tablet       Other    Gout    Relevant Medications    predniSONE (DELTASONE) 20 MG tablet      Other Visit Diagnoses     Chronic bilateral low back pain with right-sided sciatica    -  Primary    Relevant Medications    HYDROcodone-acetaminophen (NORCO) 7.5-325 MG per tablet    predniSONE (DELTASONE) 20 MG tablet    Insomnia, unspecified type        Relevant Medications    temazepam (RESTORIL) 15 MG capsule    Acute non-recurrent maxillary sinusitis        Relevant Medications    azithromycin (ZITHROMAX Z-HAO) 250 MG tablet    Numbness and tingling of both feet        Relevant Medications    gabapentin (NEURONTIN) 100 MG capsule

## 2018-02-05 ENCOUNTER — TELEPHONE (OUTPATIENT)
Dept: FAMILY MEDICINE CLINIC | Facility: CLINIC | Age: 73
End: 2018-02-05

## 2018-02-05 DIAGNOSIS — I10 ESSENTIAL HYPERTENSION: ICD-10-CM

## 2018-02-05 RX ORDER — LOSARTAN POTASSIUM 100 MG/1
100 TABLET ORAL DAILY
Qty: 90 TABLET | Refills: 3 | Status: SHIPPED | OUTPATIENT
Start: 2018-02-05 | End: 2018-06-11 | Stop reason: SDUPTHER

## 2018-02-05 RX ORDER — OMEPRAZOLE 40 MG/1
CAPSULE, DELAYED RELEASE ORAL
Qty: 90 CAPSULE | Refills: 1 | Status: SHIPPED | OUTPATIENT
Start: 2018-02-05 | End: 2018-06-11

## 2018-02-05 NOTE — TELEPHONE ENCOUNTER
----- Message from Libia Gibbs sent at 2/5/2018  9:51 AM EST -----  Contact: PATIENT  HE CALLED IN REFILL ON FOLLOWING MEDICATION, RX SAID THEY COULD NOT FILL IT THAT HE NEEDED TO CONTACT THE RX, HE SAID THERE WERE REFILLS STILL ON IT. PLEASE CHECK AND CALL THE RX :    losartan (COZAAR) 100 MG tablet 90 tablet 3 6/26/2017       Sig - Route: Take 1 tablet by mouth Daily. - Oral   Associated Diagnoses       Essential hypertension        Pharmacy     [facility] #[id number] - [address] [phone] [facility] [phone]

## 2018-06-11 ENCOUNTER — OFFICE VISIT (OUTPATIENT)
Dept: FAMILY MEDICINE CLINIC | Facility: CLINIC | Age: 73
End: 2018-06-11

## 2018-06-11 VITALS
BODY MASS INDEX: 33.27 KG/M2 | TEMPERATURE: 98 F | HEIGHT: 73 IN | WEIGHT: 251 LBS | SYSTOLIC BLOOD PRESSURE: 122 MMHG | OXYGEN SATURATION: 98 % | DIASTOLIC BLOOD PRESSURE: 70 MMHG | HEART RATE: 63 BPM

## 2018-06-11 DIAGNOSIS — R20.0 NUMBNESS AND TINGLING OF BOTH FEET: ICD-10-CM

## 2018-06-11 DIAGNOSIS — G89.29 CHRONIC BILATERAL LOW BACK PAIN WITH RIGHT-SIDED SCIATICA: ICD-10-CM

## 2018-06-11 DIAGNOSIS — M10.00 IDIOPATHIC GOUT, UNSPECIFIED CHRONICITY, UNSPECIFIED SITE: ICD-10-CM

## 2018-06-11 DIAGNOSIS — M54.41 CHRONIC BILATERAL LOW BACK PAIN WITH RIGHT-SIDED SCIATICA: ICD-10-CM

## 2018-06-11 DIAGNOSIS — M15.9 GENERALIZED OSTEOARTHROSIS, INVOLVING MULTIPLE SITES: ICD-10-CM

## 2018-06-11 DIAGNOSIS — I10 ESSENTIAL HYPERTENSION: ICD-10-CM

## 2018-06-11 DIAGNOSIS — G47.00 INSOMNIA, UNSPECIFIED TYPE: ICD-10-CM

## 2018-06-11 DIAGNOSIS — R20.2 NUMBNESS AND TINGLING OF BOTH FEET: ICD-10-CM

## 2018-06-11 DIAGNOSIS — L98.9 LESION OF SKIN OF FACE: Primary | ICD-10-CM

## 2018-06-11 PROCEDURE — 99214 OFFICE O/P EST MOD 30 MIN: CPT | Performed by: FAMILY MEDICINE

## 2018-06-11 RX ORDER — TEMAZEPAM 15 MG/1
15 CAPSULE ORAL NIGHTLY PRN
Qty: 30 CAPSULE | Refills: 5 | Status: SHIPPED | OUTPATIENT
Start: 2018-06-11 | End: 2019-01-11 | Stop reason: SDUPTHER

## 2018-06-11 RX ORDER — DOXAZOSIN MESYLATE 4 MG/1
4 TABLET ORAL NIGHTLY
Qty: 90 TABLET | Refills: 3 | Status: SHIPPED | OUTPATIENT
Start: 2018-06-11 | End: 2019-06-24 | Stop reason: SDUPTHER

## 2018-06-11 RX ORDER — OMEPRAZOLE 40 MG/1
40 CAPSULE, DELAYED RELEASE ORAL DAILY
Qty: 90 CAPSULE | Refills: 3 | Status: SHIPPED | OUTPATIENT
Start: 2018-06-11 | End: 2019-07-11 | Stop reason: SDUPTHER

## 2018-06-11 RX ORDER — GABAPENTIN 100 MG/1
CAPSULE ORAL
Qty: 60 CAPSULE | Refills: 5 | Status: SHIPPED | OUTPATIENT
Start: 2018-06-11 | End: 2019-01-11 | Stop reason: SDUPTHER

## 2018-06-11 RX ORDER — LOSARTAN POTASSIUM 100 MG/1
100 TABLET ORAL DAILY
Qty: 90 TABLET | Refills: 3 | Status: SHIPPED | OUTPATIENT
Start: 2018-06-11 | End: 2019-07-11 | Stop reason: SDUPTHER

## 2018-06-11 RX ORDER — ALLOPURINOL 300 MG/1
300 TABLET ORAL DAILY
Qty: 90 TABLET | Refills: 3 | Status: SHIPPED | OUTPATIENT
Start: 2018-06-11 | End: 2019-07-10 | Stop reason: SDUPTHER

## 2018-06-11 RX ORDER — HYDROCODONE BITARTRATE AND ACETAMINOPHEN 7.5; 325 MG/1; MG/1
1 TABLET ORAL EVERY 6 HOURS PRN
Qty: 90 TABLET | Refills: 0 | Status: SHIPPED | OUTPATIENT
Start: 2018-06-11 | End: 2019-01-11 | Stop reason: SDUPTHER

## 2018-06-11 RX ORDER — METOPROLOL SUCCINATE 100 MG/1
100 TABLET, EXTENDED RELEASE ORAL DAILY
Qty: 90 TABLET | Refills: 3 | Status: SHIPPED | OUTPATIENT
Start: 2018-06-11 | End: 2019-07-11 | Stop reason: SDUPTHER

## 2018-06-11 NOTE — PROGRESS NOTES
Subjective   Papito Salinas is a 72 y.o. male    Patient here for 6 month recheck related to medical problems including hypertension, gout, degenerative disc disease lumbar spine, GERD, insomnia and peripheral neuropathy.  He is due for refills on medications.  He will be due for lab studies at his next visit.  He is not fasting today.  He remains active playing golf but reports increased lumbar pain without radicular symptoms.  He has neuropathy symptoms of both feet.  He reports that his weight has been stable.  He has no acute complaints today.      Hypertension   Pertinent negatives include no chest pain, headaches, palpitations or shortness of breath.   Gout   Pertinent negatives include no abdominal pain, arthralgias, chest pain, coughing, fatigue, headaches, myalgias, nausea, numbness, rash or weakness.   Back Pain   Pertinent negatives include no abdominal pain, chest pain, headaches, numbness or weakness.   Heartburn   He reports no abdominal pain, no chest pain, no coughing or no nausea. Pertinent negatives include no fatigue.   Insomnia   Pertinent negatives include no abdominal pain, arthralgias, chest pain, coughing, fatigue, headaches, myalgias, nausea, numbness, rash or weakness.   Peripheral Neuropathy   Pertinent negatives include no abdominal pain, arthralgias, chest pain, coughing, fatigue, headaches, myalgias, nausea, numbness, rash or weakness.       The following portions of the patient's history were reviewed and updated as appropriate: allergies, current medications, past social history and problem list    Review of Systems   Constitutional: Negative.  Negative for appetite change, fatigue and unexpected weight change.   Respiratory: Negative.  Negative for cough, chest tightness and shortness of breath.    Cardiovascular: Negative for chest pain, palpitations and leg swelling.   Gastrointestinal: Negative.  Negative for abdominal distention, abdominal pain, diarrhea and nausea.         Patient experiencing heartburn/acid reflux     Musculoskeletal: Positive for back pain and gout. Negative for arthralgias, gait problem and myalgias.   Skin: Negative for color change and rash.   Neurological: Negative for dizziness, tremors, syncope, weakness, light-headedness, numbness and headaches.   Psychiatric/Behavioral: Positive for dysphoric mood and sleep disturbance. Negative for agitation, behavioral problems, confusion, decreased concentration, hallucinations and suicidal ideas. The patient is nervous/anxious and has insomnia. The patient is not hyperactive.        Objective     Vitals:    06/11/18 1058   BP: 122/70   Pulse: 63   Temp: 98 °F (36.7 °C)   SpO2: 98%       Physical Exam   Constitutional: He is oriented to person, place, and time. He appears well-developed and well-nourished. No distress.   HENT:   Head: Normocephalic.   Mouth/Throat: Oropharynx is clear and moist.   Eyes: Conjunctivae are normal. Pupils are equal, round, and reactive to light.   Neck: Neck supple. No JVD present. No thyromegaly present.   Cardiovascular: Normal rate, regular rhythm, normal heart sounds, intact distal pulses and normal pulses.    No murmur heard.  Pulmonary/Chest: Effort normal and breath sounds normal. No respiratory distress.   Abdominal: Soft. Bowel sounds are normal. There is no hepatosplenomegaly. There is no tenderness.   Musculoskeletal: He exhibits no edema.        Lumbar back: He exhibits decreased range of motion, tenderness, bony tenderness and pain. He exhibits no swelling, no deformity and no spasm.   Lymphadenopathy:     He has no cervical adenopathy.   Neurological: He is alert and oriented to person, place, and time. He has normal reflexes. Coordination normal.   Skin: Skin is warm and dry. No rash noted. He is not diaphoretic.   Psychiatric: He has a normal mood and affect. His behavior is normal. Judgment and thought content normal. Cognition and memory are normal. He is attentive.    Nursing note and vitals reviewed.      Assessment/Plan   Problem List Items Addressed This Visit        Musculoskeletal and Integument    Generalized osteoarthrosis, involving multiple sites    Relevant Medications    HYDROcodone-acetaminophen (NORCO) 7.5-325 MG per tablet       Other    Gout    Relevant Medications    allopurinol (ZYLOPRIM) 300 MG tablet      Other Visit Diagnoses     Lesion of skin of face    -  Primary    Relevant Orders    Ambulatory Referral to Dermatology    Insomnia, unspecified type        Relevant Medications    temazepam (RESTORIL) 15 MG capsule    Chronic bilateral low back pain with right-sided sciatica        Relevant Medications    HYDROcodone-acetaminophen (NORCO) 7.5-325 MG per tablet    Numbness and tingling of both feet        Relevant Medications    gabapentin (NEURONTIN) 100 MG capsule    Essential hypertension        Relevant Medications    doxazosin (CARDURA) 4 MG tablet    losartan (COZAAR) 100 MG tablet    metoprolol succinate XL (TOPROL-XL) 100 MG 24 hr tablet

## 2018-09-12 ENCOUNTER — LAB (OUTPATIENT)
Dept: LAB | Facility: HOSPITAL | Age: 73
End: 2018-09-12

## 2018-09-12 ENCOUNTER — OFFICE VISIT (OUTPATIENT)
Dept: FAMILY MEDICINE CLINIC | Facility: CLINIC | Age: 73
End: 2018-09-12

## 2018-09-12 VITALS
DIASTOLIC BLOOD PRESSURE: 88 MMHG | OXYGEN SATURATION: 98 % | TEMPERATURE: 98.1 F | HEART RATE: 62 BPM | WEIGHT: 250 LBS | BODY MASS INDEX: 33.13 KG/M2 | SYSTOLIC BLOOD PRESSURE: 138 MMHG | HEIGHT: 73 IN

## 2018-09-12 DIAGNOSIS — E78.2 MIXED HYPERLIPIDEMIA: ICD-10-CM

## 2018-09-12 DIAGNOSIS — M10.00 IDIOPATHIC GOUT, UNSPECIFIED CHRONICITY, UNSPECIFIED SITE: ICD-10-CM

## 2018-09-12 DIAGNOSIS — Z12.5 PROSTATE CANCER SCREENING: ICD-10-CM

## 2018-09-12 DIAGNOSIS — Z51.81 MEDICATION MONITORING ENCOUNTER: ICD-10-CM

## 2018-09-12 DIAGNOSIS — M47.817 LUMBOSACRAL SPONDYLOSIS WITHOUT MYELOPATHY: ICD-10-CM

## 2018-09-12 DIAGNOSIS — I10 ESSENTIAL HYPERTENSION: ICD-10-CM

## 2018-09-12 DIAGNOSIS — Z00.00 MEDICARE ANNUAL WELLNESS VISIT, SUBSEQUENT: Primary | ICD-10-CM

## 2018-09-12 LAB
ALBUMIN SERPL-MCNC: 4.13 G/DL (ref 3.2–4.8)
ALBUMIN/GLOB SERPL: 1.7 G/DL (ref 1.5–2.5)
ALP SERPL-CCNC: 91 U/L (ref 25–100)
ALT SERPL W P-5'-P-CCNC: 23 U/L (ref 7–40)
ANION GAP SERPL CALCULATED.3IONS-SCNC: 9 MMOL/L (ref 3–11)
ARTICHOKE IGE QN: 92 MG/DL (ref 0–130)
AST SERPL-CCNC: 24 U/L (ref 0–33)
BILIRUB SERPL-MCNC: 1 MG/DL (ref 0.3–1.2)
BUN BLD-MCNC: 13 MG/DL (ref 9–23)
BUN/CREAT SERPL: 10.7 (ref 7–25)
CALCIUM SPEC-SCNC: 8.9 MG/DL (ref 8.7–10.4)
CHLORIDE SERPL-SCNC: 105 MMOL/L (ref 99–109)
CHOLEST SERPL-MCNC: 156 MG/DL (ref 0–200)
CO2 SERPL-SCNC: 26 MMOL/L (ref 20–31)
CREAT BLD-MCNC: 1.22 MG/DL (ref 0.6–1.3)
DEPRECATED RDW RBC AUTO: 46.2 FL (ref 37–54)
ERYTHROCYTE [DISTWIDTH] IN BLOOD BY AUTOMATED COUNT: 13.3 % (ref 11.3–14.5)
GFR SERPL CREATININE-BSD FRML MDRD: 59 ML/MIN/1.73
GLOBULIN UR ELPH-MCNC: 2.4 GM/DL
GLUCOSE BLD-MCNC: 82 MG/DL (ref 70–100)
HCT VFR BLD AUTO: 45.7 % (ref 38.9–50.9)
HDLC SERPL-MCNC: 35 MG/DL (ref 40–60)
HGB BLD-MCNC: 15.1 G/DL (ref 13.1–17.5)
MCH RBC QN AUTO: 31.6 PG (ref 27–31)
MCHC RBC AUTO-ENTMCNC: 33 G/DL (ref 32–36)
MCV RBC AUTO: 95.6 FL (ref 80–99)
PLATELET # BLD AUTO: 177 10*3/MM3 (ref 150–450)
PMV BLD AUTO: 11.3 FL (ref 6–12)
POTASSIUM BLD-SCNC: 4.5 MMOL/L (ref 3.5–5.5)
PROT SERPL-MCNC: 6.5 G/DL (ref 5.7–8.2)
PSA SERPL-MCNC: 0.43 NG/ML (ref 0–4)
RBC # BLD AUTO: 4.78 10*6/MM3 (ref 4.2–5.76)
SODIUM BLD-SCNC: 140 MMOL/L (ref 132–146)
TRIGL SERPL-MCNC: 231 MG/DL (ref 0–150)
URATE SERPL-MCNC: 4.2 MG/DL (ref 3.7–9.2)
WBC NRBC COR # BLD: 6.47 10*3/MM3 (ref 3.5–10.8)

## 2018-09-12 PROCEDURE — 80061 LIPID PANEL: CPT

## 2018-09-12 PROCEDURE — 80053 COMPREHEN METABOLIC PANEL: CPT

## 2018-09-12 PROCEDURE — G0103 PSA SCREENING: HCPCS

## 2018-09-12 PROCEDURE — G0439 PPPS, SUBSEQ VISIT: HCPCS | Performed by: FAMILY MEDICINE

## 2018-09-12 PROCEDURE — 36415 COLL VENOUS BLD VENIPUNCTURE: CPT

## 2018-09-12 PROCEDURE — 85027 COMPLETE CBC AUTOMATED: CPT

## 2018-09-12 PROCEDURE — 84550 ASSAY OF BLOOD/URIC ACID: CPT

## 2018-09-12 NOTE — PROGRESS NOTES
QUICK REFERENCE INFORMATION:  The ABCs of the Annual Wellness Visit    Subsequent Medicare Wellness Visit    HEALTH RISK ASSESSMENT    1945    Recent Hospitalizations:  No hospitalization(s) within the last year..        Current Medical Providers:  Patient Care Team:  Dick Brewster MD as PCP - General  Dick Brewster MD as PCP - Claims Attributed        Smoking Status:  History   Smoking Status   • Current Every Day Smoker   • Packs/day: 0.50   • Years: 50.00   • Types: Cigarettes   Smokeless Tobacco   • Former User   • Types: Chew       Alcohol Consumption:  History   Alcohol Use   • Yes     Comment: occasional       Depression Screen:   PHQ-2/PHQ-9 Depression Screening 9/12/2018   Little interest or pleasure in doing things 0   Feeling down, depressed, or hopeless 0   Total Score 0       Health Habits and Functional and Cognitive Screening:  Functional & Cognitive Status 9/12/2018   Do you have difficulty preparing food and eating? No   Do you have difficulty bathing yourself, getting dressed or grooming yourself? No   Do you have difficulty using the toilet? No   Do you have difficulty moving around from place to place? Yes   Do you have trouble with steps or getting out of a bed or a chair? No   In the past year have you fallen or experienced a near fall? No   Current Diet Well Balanced Diet   Dental Exam Not up to date   Eye Exam Up to date   Exercise (times per week) 0 times per week   Current Exercise Activities Include Walking   Do you need help using the phone?  No   Are you deaf or do you have serious difficulty hearing?  No   Do you need help with transportation? No   Do you need help shopping? No   Do you need help preparing meals?  No   Do you need help with housework?  No   Do you need help with laundry? No   Do you need help taking your medications? No   Do you need help managing money? No   Do you ever drive or ride in a car without wearing a seat belt? No            Does the patient have evidence of cognitive impairment? No    Aspirin use counseling: Contraindicated from taking ASA      Recent Lab Results:  CMP:  Lab Results   Component Value Date    BUN 20 08/14/2017    CREATININE 1.10 08/14/2017    EGFRIFNONA 66 08/14/2017    BCR 18.2 08/14/2017     08/14/2017    K 4.5 08/14/2017    CO2 30.0 08/14/2017    CALCIUM 9.1 08/14/2017    ALBUMIN 3.80 08/14/2017    BILITOT 0.6 08/14/2017    ALKPHOS 77 08/14/2017    AST 25 08/14/2017    ALT 33 08/14/2017     Lipid Panel:  Lab Results   Component Value Date    CHOL 183 08/14/2017    TRIG 228 (H) 08/14/2017    HDL 44 08/14/2017     HbA1c:  Lab Results   Component Value Date    HGBA1C 4.70 07/22/2016       Visual Acuity:  No exam data present    Age-appropriate Screening Schedule:  Refer to the list below for future screening recommendations based on patient's age, sex and/or medical conditions. Orders for these recommended tests are listed in the plan section. The patient has been provided with a written plan.    Health Maintenance   Topic Date Due   • TDAP/TD VACCINES (1 - Tdap) 10/10/1965   • ZOSTER VACCINE (1 of 2) 10/10/1996   • PNEUMOCOCCAL VACCINES (65+ LOW/MEDIUM RISK) (1 of 2 - PCV13) 10/10/2011   • INFLUENZA VACCINE  08/01/2018   • LIPID PANEL  09/12/2019   • COLONOSCOPY  09/19/2027        Subjective   History of Present Illness    Papito Salinas is a 72 y.o. male who presents for an Subsequent Wellness Visit.    The following portions of the patient's history were reviewed and updated as appropriate: allergies, current medications, past family history, past medical history, past social history, past surgical history and problem list.    Outpatient Medications Prior to Visit   Medication Sig Dispense Refill   • allopurinol (ZYLOPRIM) 300 MG tablet Take 1 tablet by mouth Daily. 90 tablet 3   • azithromycin (ZITHROMAX Z-HAO) 250 MG tablet Take 2 tablets the first day, then 1 tablet daily for 4 days. 6 tablet 0  "  • doxazosin (CARDURA) 4 MG tablet Take 1 tablet by mouth Every Night. 90 tablet 3   • FLUZONE HIGH-DOSE 0.5 ML suspension prefilled syringe injection TO BE ADMINISTERED BY PHARMACIST FOR IMMUNIZATION  0   • gabapentin (NEURONTIN) 100 MG capsule 1 or 2 capsules at bedtime as needed for numbness 60 capsule 5   • HYDROcodone-acetaminophen (NORCO) 7.5-325 MG per tablet Take 1 tablet by mouth Every 6 (Six) Hours As Needed for Moderate Pain . 90 tablet 0   • LIALDA 1.2 G EC tablet Take 1 tablet by mouth 2 (Two) Times a Day.  3   • losartan (COZAAR) 100 MG tablet Take 1 tablet by mouth Daily. 90 tablet 3   • metoprolol succinate XL (TOPROL-XL) 100 MG 24 hr tablet Take 1 tablet by mouth Daily. 90 tablet 3   • omeprazole (priLOSEC) 40 MG capsule Take 1 capsule by mouth Daily. 90 capsule 3   • predniSONE (DELTASONE) 20 MG tablet Take 1 tablet by mouth Daily. As needed for acute gout flare up. 30 tablet 2   • temazepam (RESTORIL) 15 MG capsule Take 1 capsule by mouth At Night As Needed for Sleep. 30 capsule 5     No facility-administered medications prior to visit.        Patient Active Problem List   Diagnosis   • Lumbosacral spondylosis without myelopathy   • Generalized osteoarthrosis, involving multiple sites   • Gout   • Esophageal reflux   • Hypertrophy of prostate without urinary obstruction   • Tobacco abuse       Advance Care Planning:  has NO advance directive - information provided to the patient today    Identification of Risk Factors:  Risk factors include: weight , cardiovascular risk and chronic pain.    Review of Systems    Compared to one year ago, the patient feels his physical health is the same.  Compared to one year ago, the patient feels his mental health is the same.    Objective     Physical Exam    Vitals:    09/12/18 1001   BP: 138/88   Pulse: 62   Temp: 98.1 °F (36.7 °C)   SpO2: 98%   Weight: 113 kg (250 lb)   Height: 185.4 cm (72.99\")       Patient's Body mass index is 32.99 kg/m². BMI is above " normal parameters. Recommendations include: nutrition counseling.      Assessment/Plan   Patient Self-Management and Personalized Health Advice  The patient has been provided with information about: diet, exercise, weight management, prevention of cardiac or vascular disease and designing advance directives and preventive services including:   · Advance directive, Counseling for cardiovascular disease risk reduction, Exercise counseling provided, Nutrition counseling provided.    Visit Diagnoses:    ICD-10-CM ICD-9-CM   1. Medicare annual wellness visit, subsequent Z00.00 V70.0   2. Idiopathic gout, unspecified chronicity, unspecified site M10.00 274.9   3. Essential hypertension I10 401.9   4. Mixed hyperlipidemia E78.2 272.2   5. Lumbosacral spondylosis without myelopathy M47.817 721.3   6. Medication monitoring encounter Z51.81 V58.83   7. Prostate cancer screening Z12.5 V76.44       Orders Placed This Encounter   Procedures   • CBC (No Diff)     Standing Status:   Future     Standing Expiration Date:   9/12/2019   • Comprehensive Metabolic Panel     Standing Status:   Future     Standing Expiration Date:   9/12/2019   • Lipid Panel     Standing Status:   Future     Standing Expiration Date:   9/12/2019   • Uric Acid     Standing Status:   Future     Standing Expiration Date:   9/12/2019   • PSA Screen     Standing Status:   Future     Standing Expiration Date:   9/12/2019       Outpatient Encounter Prescriptions as of 9/12/2018   Medication Sig Dispense Refill   • allopurinol (ZYLOPRIM) 300 MG tablet Take 1 tablet by mouth Daily. 90 tablet 3   • azithromycin (ZITHROMAX Z-HAO) 250 MG tablet Take 2 tablets the first day, then 1 tablet daily for 4 days. 6 tablet 0   • doxazosin (CARDURA) 4 MG tablet Take 1 tablet by mouth Every Night. 90 tablet 3   • FLUZONE HIGH-DOSE 0.5 ML suspension prefilled syringe injection TO BE ADMINISTERED BY PHARMACIST FOR IMMUNIZATION  0   • gabapentin (NEURONTIN) 100 MG capsule 1 or 2  capsules at bedtime as needed for numbness 60 capsule 5   • HYDROcodone-acetaminophen (NORCO) 7.5-325 MG per tablet Take 1 tablet by mouth Every 6 (Six) Hours As Needed for Moderate Pain . 90 tablet 0   • LIALDA 1.2 G EC tablet Take 1 tablet by mouth 2 (Two) Times a Day.  3   • losartan (COZAAR) 100 MG tablet Take 1 tablet by mouth Daily. 90 tablet 3   • metoprolol succinate XL (TOPROL-XL) 100 MG 24 hr tablet Take 1 tablet by mouth Daily. 90 tablet 3   • omeprazole (priLOSEC) 40 MG capsule Take 1 capsule by mouth Daily. 90 capsule 3   • predniSONE (DELTASONE) 20 MG tablet Take 1 tablet by mouth Daily. As needed for acute gout flare up. 30 tablet 2   • temazepam (RESTORIL) 15 MG capsule Take 1 capsule by mouth At Night As Needed for Sleep. 30 capsule 5     No facility-administered encounter medications on file as of 9/12/2018.        Reviewed use of high risk medication in the elderly: yes  Reviewed for potential of harmful drug interactions in the elderly: yes    Follow Up:  No Follow-up on file.     An After Visit Summary and PPPS with all of these plans were given to the patient.

## 2018-10-11 ENCOUNTER — TELEPHONE (OUTPATIENT)
Dept: FAMILY MEDICINE CLINIC | Facility: CLINIC | Age: 73
End: 2018-10-11

## 2018-10-11 NOTE — TELEPHONE ENCOUNTER
----- Message from Demi Carpio sent at 10/11/2018 11:33 AM EDT -----  Contact: PT  HAS NOT RECEIVED LAB RESULTS FROM 9/12 BLOOD DRAW

## 2018-11-20 ENCOUNTER — TELEPHONE (OUTPATIENT)
Dept: FAMILY MEDICINE CLINIC | Facility: CLINIC | Age: 73
End: 2018-11-20

## 2018-11-20 RX ORDER — PNEUMOCOCCAL 13-VALENT CONJUGATE VACCINE 2.2; 2.2; 2.2; 2.2; 2.2; 4.4; 2.2; 2.2; 2.2; 2.2; 2.2; 2.2; 2.2 UG/.5ML; UG/.5ML; UG/.5ML; UG/.5ML; UG/.5ML; UG/.5ML; UG/.5ML; UG/.5ML; UG/.5ML; UG/.5ML; UG/.5ML; UG/.5ML; UG/.5ML
INJECTION, SUSPENSION INTRAMUSCULAR
Refills: 0 | COMMUNITY
Start: 2018-10-11 | End: 2022-01-17

## 2018-11-20 NOTE — TELEPHONE ENCOUNTER
----- Message from Libia Gibbs sent at 11/20/2018 12:35 PM EST -----  Contact: PATIENT  NEEDS A SCRIPT CALLED IN TO RX FOR HEP A VACCINE:    [id number] - [address] - [phone]  - [phone] (Phone)  [phone] (Fax)

## 2019-01-11 ENCOUNTER — OFFICE VISIT (OUTPATIENT)
Dept: FAMILY MEDICINE CLINIC | Facility: CLINIC | Age: 74
End: 2019-01-11

## 2019-01-11 VITALS
DIASTOLIC BLOOD PRESSURE: 78 MMHG | BODY MASS INDEX: 33.53 KG/M2 | HEART RATE: 64 BPM | TEMPERATURE: 97.8 F | OXYGEN SATURATION: 98 % | HEIGHT: 73 IN | SYSTOLIC BLOOD PRESSURE: 120 MMHG | WEIGHT: 253 LBS

## 2019-01-11 DIAGNOSIS — R20.2 NUMBNESS AND TINGLING OF BOTH FEET: ICD-10-CM

## 2019-01-11 DIAGNOSIS — G89.29 CHRONIC BILATERAL LOW BACK PAIN WITH RIGHT-SIDED SCIATICA: ICD-10-CM

## 2019-01-11 DIAGNOSIS — M54.41 CHRONIC BILATERAL LOW BACK PAIN WITH RIGHT-SIDED SCIATICA: ICD-10-CM

## 2019-01-11 DIAGNOSIS — M15.9 GENERALIZED OSTEOARTHROSIS, INVOLVING MULTIPLE SITES: ICD-10-CM

## 2019-01-11 DIAGNOSIS — R20.0 NUMBNESS AND TINGLING OF BOTH FEET: ICD-10-CM

## 2019-01-11 DIAGNOSIS — G47.00 INSOMNIA, UNSPECIFIED TYPE: ICD-10-CM

## 2019-01-11 PROCEDURE — 99214 OFFICE O/P EST MOD 30 MIN: CPT | Performed by: FAMILY MEDICINE

## 2019-01-11 RX ORDER — HYDROCODONE BITARTRATE AND ACETAMINOPHEN 7.5; 325 MG/1; MG/1
1 TABLET ORAL EVERY 6 HOURS PRN
Qty: 90 TABLET | Refills: 0 | Status: SHIPPED | OUTPATIENT
Start: 2019-01-11 | End: 2019-07-11 | Stop reason: SDUPTHER

## 2019-01-11 RX ORDER — GABAPENTIN 100 MG/1
CAPSULE ORAL
Qty: 60 CAPSULE | Refills: 5 | Status: SHIPPED | OUTPATIENT
Start: 2019-01-11 | End: 2019-07-11

## 2019-01-11 RX ORDER — TEMAZEPAM 15 MG/1
15 CAPSULE ORAL NIGHTLY PRN
Qty: 30 CAPSULE | Refills: 5 | Status: SHIPPED | OUTPATIENT
Start: 2019-01-11 | End: 2019-07-11 | Stop reason: SDUPTHER

## 2019-01-11 NOTE — PROGRESS NOTES
Subjective   Papito Salinas is a 73 y.o. male    Chief Complaint    Chronic back pain  Peripheral neuropathy  Insomnia  Gout      History of Present Illness   Patient presents today for 6 month follow-up regarding the above-noted problems.  Back pain secondary to degenerative disc disease and lumbar spondylosis.  Patient due for refill on several prescriptions, primarily those that are controlled substances.  He has an acute complaint of a stopped up sensation in the right ear.  He admits that he uses Q-tips.  He has a history of prior cerumen impactions.      The following portions of the patient's history were reviewed and updated as appropriate: allergies, current medications, past social history and problem list    Review of Systems   Constitutional: Negative.  Negative for chills, fatigue, fever and unexpected weight change.   HENT: Positive for congestion, ear pain, postnasal drip, rhinorrhea and sinus pressure. Negative for sore throat.    Eyes: Positive for pain.   Respiratory: Positive for cough. Negative for chest tightness and shortness of breath.    Cardiovascular: Negative for chest pain, palpitations and leg swelling.   Gastrointestinal: Negative.  Negative for nausea.   Genitourinary: Negative.    Musculoskeletal: Positive for arthralgias and back pain. Negative for gait problem and myalgias.   Skin: Negative for color change and rash.   Neurological: Positive for numbness. Negative for dizziness, tremors, syncope, weakness and headaches.   Hematological: Negative for adenopathy. Does not bruise/bleed easily.   Psychiatric/Behavioral: Negative for behavioral problems and dysphoric mood. The patient is not nervous/anxious.        Objective     Vitals:    01/11/19 1136   BP: 120/78   Pulse: 64   Temp: 97.8 °F (36.6 °C)   SpO2: 98%       Physical Exam   Constitutional: He is oriented to person, place, and time. He appears well-developed and well-nourished.   HENT:   Head: Normocephalic and  atraumatic.   Right Ear: Tympanic membrane and ear canal normal.   Left Ear: Tympanic membrane and ear canal normal.   Nose: Mucosal edema, rhinorrhea and sinus tenderness present. Right sinus exhibits maxillary sinus tenderness and frontal sinus tenderness. Left sinus exhibits maxillary sinus tenderness and frontal sinus tenderness.   Mouth/Throat: Oropharynx is clear and moist. No oropharyngeal exudate.   Eyes: Pupils are equal, round, and reactive to light.   Neck: No JVD present.   Cardiovascular: Normal rate, regular rhythm, normal heart sounds, intact distal pulses and normal pulses.   No murmur heard.  Pulmonary/Chest: Effort normal and breath sounds normal. No respiratory distress.   Abdominal: Soft. Bowel sounds are normal. There is no hepatosplenomegaly. There is no tenderness.   Musculoskeletal: He exhibits tenderness. He exhibits no edema or deformity.        Lumbar back: He exhibits decreased range of motion, tenderness, bony tenderness and pain. He exhibits no swelling, no deformity and no spasm.   Neurological: He is alert and oriented to person, place, and time. He has normal reflexes.   Skin: Skin is warm and dry.   Psychiatric: He has a normal mood and affect. His behavior is normal.   Nursing note and vitals reviewed.      Assessment/Plan   Problem List Items Addressed This Visit        Musculoskeletal and Integument    Generalized osteoarthrosis, involving multiple sites    Relevant Medications    HYDROcodone-acetaminophen (NORCO) 7.5-325 MG per tablet      Other Visit Diagnoses     Numbness and tingling of both feet        Relevant Medications    gabapentin (NEURONTIN) 100 MG capsule    Chronic bilateral low back pain with right-sided sciatica        Relevant Medications    HYDROcodone-acetaminophen (NORCO) 7.5-325 MG per tablet    Insomnia, unspecified type        Relevant Medications    temazepam (RESTORIL) 15 MG capsule

## 2019-01-23 ENCOUNTER — TELEPHONE (OUTPATIENT)
Dept: FAMILY MEDICINE CLINIC | Facility: CLINIC | Age: 74
End: 2019-01-23

## 2019-01-23 NOTE — TELEPHONE ENCOUNTER
----- Message from Marleny Black sent at 1/18/2019 12:36 PM EST -----  Contact: PT.  PT. SEE'S DR. MARTINEZ  PTKiet IS WANTING MA TO CONTACT HIM BACK, RE. MEDICATION PROBLEM WITH RX.    [facility]    PT. CAN BE REACHED @ CELL PHONE #: [phone].

## 2019-01-23 NOTE — TELEPHONE ENCOUNTER
Spoke with patient about his pain medicine.  Told him his PA for his norco was denied, so he would have to pay out of pocket or talk to the pharmacy about what might be covered through his insurance.

## 2019-01-29 DIAGNOSIS — M54.41 CHRONIC BILATERAL LOW BACK PAIN WITH RIGHT-SIDED SCIATICA: ICD-10-CM

## 2019-01-29 DIAGNOSIS — M10.00 IDIOPATHIC GOUT, UNSPECIFIED CHRONICITY, UNSPECIFIED SITE: ICD-10-CM

## 2019-01-29 DIAGNOSIS — G89.29 CHRONIC BILATERAL LOW BACK PAIN WITH RIGHT-SIDED SCIATICA: ICD-10-CM

## 2019-01-29 NOTE — TELEPHONE ENCOUNTER
----- Message from Demi Carpio sent at 1/29/2019 11:02 AM EST -----  Contact: PT  CHECKING STATUS ON PENDING REFILL REQUEST IN CHART FROM 1/15    PredniSONE 20 mg Oral Daily, As needed for acute gout flare up.     [id number] [address] - [phone]  - [phone] (Phone)  [phone] (Fax)

## 2019-01-30 RX ORDER — PREDNISONE 20 MG/1
20 TABLET ORAL DAILY
Qty: 30 TABLET | Refills: 2 | Status: SHIPPED | OUTPATIENT
Start: 2019-01-30 | End: 2019-04-27 | Stop reason: SDUPTHER

## 2019-04-19 ENCOUNTER — OFFICE VISIT (OUTPATIENT)
Dept: FAMILY MEDICINE CLINIC | Facility: CLINIC | Age: 74
End: 2019-04-19

## 2019-04-19 ENCOUNTER — TELEPHONE (OUTPATIENT)
Dept: FAMILY MEDICINE CLINIC | Facility: CLINIC | Age: 74
End: 2019-04-19

## 2019-04-19 VITALS
BODY MASS INDEX: 34.06 KG/M2 | HEART RATE: 64 BPM | OXYGEN SATURATION: 98 % | SYSTOLIC BLOOD PRESSURE: 128 MMHG | DIASTOLIC BLOOD PRESSURE: 72 MMHG | TEMPERATURE: 98.3 F | HEIGHT: 73 IN | WEIGHT: 257 LBS

## 2019-04-19 DIAGNOSIS — R60.9 EDEMA, UNSPECIFIED TYPE: Primary | ICD-10-CM

## 2019-04-19 PROCEDURE — 99213 OFFICE O/P EST LOW 20 MIN: CPT | Performed by: FAMILY MEDICINE

## 2019-04-19 RX ORDER — AMITRIPTYLINE HYDROCHLORIDE 10 MG/1
10 TABLET, FILM COATED ORAL NIGHTLY
Qty: 30 TABLET | Refills: 3 | Status: SHIPPED | OUTPATIENT
Start: 2019-04-19 | End: 2019-05-31 | Stop reason: SDUPTHER

## 2019-04-19 NOTE — PROGRESS NOTES
Subjective   Papito Salinas is a 73 y.o. male    Chief Complaint    Left leg swelling and tenderness    History of Present Illness  Patient presents today with several days of swelling involving his left lower extremity.  No known trauma.  Has some chronic swelling in both lower extremities but this is much more pronounced.  There is also been some erythema and tenderness involving his left calf.  He has no history of immobilization or other trauma to the left lower extremity.  He has a remote history of trauma to the left femur with fracture and subsequent surgery.  He states that they did some vein ligation when they did surgery on his femur.    The following portions of the patient's history were reviewed and updated as appropriate: allergies, current medications, past social history and problem list    Review of Systems   Constitutional: Negative.    Respiratory: Negative.    Cardiovascular: Negative.    Gastrointestinal: Negative.    Musculoskeletal: Positive for arthralgias, gait problem and myalgias.   Skin: Positive for color change.   Neurological: Positive for numbness.       Objective     Vitals:    04/19/19 1057   BP: 128/72   Pulse: 64   Temp: 98.3 °F (36.8 °C)   SpO2: 98%       Physical Exam   Constitutional: He is oriented to person, place, and time. He appears well-developed and well-nourished.   Cardiovascular: Normal rate and regular rhythm.   Pulmonary/Chest: Effort normal and breath sounds normal.   Musculoskeletal: He exhibits edema and tenderness.   Diffuse swelling with erythema and tenderness of the left lower leg.  Right lower extremity shows trace edema.   Neurological: He is alert and oriented to person, place, and time.   Nursing note and vitals reviewed.      Assessment/Plan   Problem List Items Addressed This Visit     None      Visit Diagnoses     Edema, unspecified type    -  Primary    LLE, R/O DVT.        Patient will be worked in at the vascular lab to hopefully rule out a DVT.   If he does not have a DVT we will stop his gabapentin and try amitriptyline 10 mg at bedtime for his neuropathy.  If he does have a DVT we will treat with Eliquis 5 mg twice a day and have him follow-up in 4 weeks.

## 2019-04-19 NOTE — TELEPHONE ENCOUNTER
Pt did not have DVT, per Dr. Brewster to stop the gabapentin and start amitriptyline 10mg QHS.  I escribed that into the pharmacy and Pt is aware.

## 2019-04-27 DIAGNOSIS — M54.41 CHRONIC BILATERAL LOW BACK PAIN WITH RIGHT-SIDED SCIATICA: ICD-10-CM

## 2019-04-27 DIAGNOSIS — M10.00 IDIOPATHIC GOUT, UNSPECIFIED CHRONICITY, UNSPECIFIED SITE: ICD-10-CM

## 2019-04-27 DIAGNOSIS — G89.29 CHRONIC BILATERAL LOW BACK PAIN WITH RIGHT-SIDED SCIATICA: ICD-10-CM

## 2019-04-29 RX ORDER — PREDNISONE 20 MG/1
TABLET ORAL
Qty: 30 TABLET | Refills: 2 | Status: SHIPPED | OUTPATIENT
Start: 2019-04-29 | End: 2021-01-25 | Stop reason: SDUPTHER

## 2019-05-31 ENCOUNTER — TELEPHONE (OUTPATIENT)
Dept: FAMILY MEDICINE CLINIC | Facility: CLINIC | Age: 74
End: 2019-05-31

## 2019-05-31 RX ORDER — AMITRIPTYLINE HYDROCHLORIDE 10 MG/1
10 TABLET, FILM COATED ORAL NIGHTLY
Qty: 90 TABLET | Refills: 1 | Status: SHIPPED | OUTPATIENT
Start: 2019-05-31 | End: 2020-02-11

## 2019-06-24 DIAGNOSIS — I10 ESSENTIAL HYPERTENSION: ICD-10-CM

## 2019-06-28 RX ORDER — DOXAZOSIN MESYLATE 4 MG/1
TABLET ORAL
Qty: 90 TABLET | Refills: 2 | Status: SHIPPED | OUTPATIENT
Start: 2019-06-28 | End: 2020-03-27 | Stop reason: SDUPTHER

## 2019-07-10 DIAGNOSIS — M10.00 IDIOPATHIC GOUT, UNSPECIFIED CHRONICITY, UNSPECIFIED SITE: ICD-10-CM

## 2019-07-11 ENCOUNTER — OFFICE VISIT (OUTPATIENT)
Dept: FAMILY MEDICINE CLINIC | Facility: CLINIC | Age: 74
End: 2019-07-11

## 2019-07-11 VITALS
RESPIRATION RATE: 24 BRPM | WEIGHT: 255 LBS | SYSTOLIC BLOOD PRESSURE: 152 MMHG | DIASTOLIC BLOOD PRESSURE: 90 MMHG | BODY MASS INDEX: 33.65 KG/M2 | HEART RATE: 68 BPM | OXYGEN SATURATION: 95 %

## 2019-07-11 DIAGNOSIS — G47.00 INSOMNIA, UNSPECIFIED TYPE: ICD-10-CM

## 2019-07-11 DIAGNOSIS — G89.29 CHRONIC BILATERAL LOW BACK PAIN WITH RIGHT-SIDED SCIATICA: ICD-10-CM

## 2019-07-11 DIAGNOSIS — I10 ESSENTIAL HYPERTENSION: ICD-10-CM

## 2019-07-11 DIAGNOSIS — M10.00 IDIOPATHIC GOUT, UNSPECIFIED CHRONICITY, UNSPECIFIED SITE: ICD-10-CM

## 2019-07-11 DIAGNOSIS — M15.9 GENERALIZED OSTEOARTHROSIS, INVOLVING MULTIPLE SITES: ICD-10-CM

## 2019-07-11 DIAGNOSIS — M54.41 CHRONIC BILATERAL LOW BACK PAIN WITH RIGHT-SIDED SCIATICA: ICD-10-CM

## 2019-07-11 PROCEDURE — 99214 OFFICE O/P EST MOD 30 MIN: CPT | Performed by: FAMILY MEDICINE

## 2019-07-11 RX ORDER — HYDROCODONE BITARTRATE AND ACETAMINOPHEN 7.5; 325 MG/1; MG/1
1 TABLET ORAL EVERY 6 HOURS PRN
Qty: 90 TABLET | Refills: 0 | Status: SHIPPED | OUTPATIENT
Start: 2019-07-11 | End: 2020-01-13 | Stop reason: SDUPTHER

## 2019-07-11 RX ORDER — TEMAZEPAM 15 MG/1
15 CAPSULE ORAL NIGHTLY PRN
Qty: 30 CAPSULE | Refills: 5 | Status: SHIPPED | OUTPATIENT
Start: 2019-07-11 | End: 2020-01-13 | Stop reason: SDUPTHER

## 2019-07-11 RX ORDER — METOPROLOL SUCCINATE 100 MG/1
100 TABLET, EXTENDED RELEASE ORAL DAILY
Qty: 90 TABLET | Refills: 3 | Status: SHIPPED | OUTPATIENT
Start: 2019-07-11 | End: 2020-07-15

## 2019-07-11 RX ORDER — ALLOPURINOL 300 MG/1
TABLET ORAL
Qty: 90 TABLET | Refills: 3 | Status: SHIPPED | OUTPATIENT
Start: 2019-07-11 | End: 2019-07-11 | Stop reason: SDUPTHER

## 2019-07-11 RX ORDER — ALLOPURINOL 300 MG/1
300 TABLET ORAL DAILY
Qty: 90 TABLET | Refills: 3 | Status: SHIPPED | OUTPATIENT
Start: 2019-07-11 | End: 2020-08-20 | Stop reason: SDUPTHER

## 2019-07-11 RX ORDER — OMEPRAZOLE 40 MG/1
40 CAPSULE, DELAYED RELEASE ORAL DAILY
Qty: 90 CAPSULE | Refills: 3 | Status: SHIPPED | OUTPATIENT
Start: 2019-07-11 | End: 2020-08-20

## 2019-07-11 RX ORDER — LOSARTAN POTASSIUM 100 MG/1
100 TABLET ORAL DAILY
Qty: 90 TABLET | Refills: 3 | Status: SHIPPED | OUTPATIENT
Start: 2019-07-11 | End: 2020-04-22 | Stop reason: RX

## 2019-07-11 NOTE — PROGRESS NOTES
Subjective   Papito Salinas is a 73 y.o. male    Chief Complaint    Hypertension  Chronic back pain  Insomnia  Gout  Neuropathy  Left lower extremity swelling    History of Present Illness  Patient presents today for recheck regarding the above-noted chronic medical problems.  When last seen he was having increased swelling of his left lower extremity.  DVT was ruled out with a Doppler ultrasound.  He reports that the swelling continues but goes down every night.  He initially thought it was related to gabapentin but it has not changed since he stopped the gabapentin.  We switched him over to amitriptyline which he says works about the same as the gabapentin.  His neuropathy symptoms are generally short-lived and go away if he gets up and walks.  He is been doing a lot of golfing this summer.  His back pain is about the same.  Blood pressure is noted to be elevated today the patient tells me he did not take his medicine this morning.  He will be due for his annual Medicare wellness at his next visit in 6 months.  He is due for some prescription refills today including his controlled substances hydrocodone and temazepam.    The following portions of the patient's history were reviewed and updated as appropriate: allergies, current medications, past social history and problem list    Review of Systems   Constitutional: Negative.  Negative for chills, fatigue, fever and unexpected weight change.   HENT: Positive for postnasal drip and rhinorrhea. Negative for congestion, ear pain, sinus pressure and sore throat.    Eyes: Negative for pain.   Respiratory: Negative for cough, chest tightness and shortness of breath.    Cardiovascular: Positive for leg swelling. Negative for chest pain and palpitations.   Gastrointestinal: Negative.  Negative for nausea and vomiting.   Genitourinary: Negative.    Musculoskeletal: Positive for arthralgias and back pain. Negative for gait problem and myalgias.   Skin: Negative for color  change and rash.   Neurological: Positive for numbness. Negative for dizziness, tremors, syncope, weakness and headaches.   Hematological: Negative for adenopathy. Does not bruise/bleed easily.   Psychiatric/Behavioral: Negative for behavioral problems and dysphoric mood. The patient is not nervous/anxious.        Objective     Vitals:    07/11/19 1124   BP: 152/90   Pulse: 68   Resp: 24   SpO2: 95%       Physical Exam   Constitutional: He is oriented to person, place, and time. He appears well-developed and well-nourished. No distress.   HENT:   Head: Normocephalic and atraumatic.   Right Ear: Tympanic membrane and ear canal normal.   Left Ear: Tympanic membrane and ear canal normal.   Nose: Right sinus exhibits no maxillary sinus tenderness and no frontal sinus tenderness. Left sinus exhibits no maxillary sinus tenderness and no frontal sinus tenderness.   Mouth/Throat: Oropharynx is clear and moist. No oropharyngeal exudate.   Eyes: Conjunctivae are normal. Pupils are equal, round, and reactive to light.   Neck: No JVD present.   Cardiovascular: Normal rate, regular rhythm, normal heart sounds, intact distal pulses and normal pulses.   No murmur heard.  Pulmonary/Chest: Effort normal and breath sounds normal. No respiratory distress.   Abdominal: Soft. Bowel sounds are normal. There is no hepatosplenomegaly. There is no tenderness.   Musculoskeletal: He exhibits tenderness. He exhibits no edema or deformity.        Lumbar back: He exhibits decreased range of motion, tenderness, bony tenderness and pain. He exhibits no swelling, no deformity and no spasm.   Neurological: He is alert and oriented to person, place, and time. He has normal reflexes. Coordination normal.   Skin: Skin is warm and dry. He is not diaphoretic.   Psychiatric: He has a normal mood and affect. His behavior is normal. Judgment and thought content normal. Cognition and memory are normal. He is attentive.   Nursing note and vitals  reviewed.      Assessment/Plan   Problem List Items Addressed This Visit        Musculoskeletal and Integument    Generalized osteoarthrosis, involving multiple sites    Relevant Medications    HYDROcodone-acetaminophen (NORCO) 7.5-325 MG per tablet       Other    Gout    Relevant Medications    allopurinol (ZYLOPRIM) 300 MG tablet      Other Visit Diagnoses     Chronic bilateral low back pain with right-sided sciatica        Relevant Medications    HYDROcodone-acetaminophen (NORCO) 7.5-325 MG per tablet    Insomnia, unspecified type        Relevant Medications    temazepam (RESTORIL) 15 MG capsule    Essential hypertension        Relevant Medications    losartan (COZAAR) 100 MG tablet    metoprolol succinate XL (TOPROL-XL) 100 MG 24 hr tablet        Monitor blood pressure, goal less than 135/85.

## 2020-01-13 ENCOUNTER — OFFICE VISIT (OUTPATIENT)
Dept: FAMILY MEDICINE CLINIC | Facility: CLINIC | Age: 75
End: 2020-01-13

## 2020-01-13 ENCOUNTER — LAB (OUTPATIENT)
Dept: LAB | Facility: HOSPITAL | Age: 75
End: 2020-01-13

## 2020-01-13 VITALS
HEIGHT: 73 IN | OXYGEN SATURATION: 97 % | TEMPERATURE: 98 F | DIASTOLIC BLOOD PRESSURE: 70 MMHG | SYSTOLIC BLOOD PRESSURE: 130 MMHG | BODY MASS INDEX: 33.93 KG/M2 | HEART RATE: 64 BPM | WEIGHT: 256 LBS

## 2020-01-13 DIAGNOSIS — R60.9 EDEMA, UNSPECIFIED TYPE: ICD-10-CM

## 2020-01-13 DIAGNOSIS — M10.00 IDIOPATHIC GOUT, UNSPECIFIED CHRONICITY, UNSPECIFIED SITE: ICD-10-CM

## 2020-01-13 DIAGNOSIS — M54.41 CHRONIC BILATERAL LOW BACK PAIN WITH RIGHT-SIDED SCIATICA: ICD-10-CM

## 2020-01-13 DIAGNOSIS — G89.29 CHRONIC BILATERAL LOW BACK PAIN WITH RIGHT-SIDED SCIATICA: ICD-10-CM

## 2020-01-13 DIAGNOSIS — Z12.5 PROSTATE CANCER SCREENING: ICD-10-CM

## 2020-01-13 DIAGNOSIS — Z51.81 MEDICATION MONITORING ENCOUNTER: ICD-10-CM

## 2020-01-13 DIAGNOSIS — G47.00 INSOMNIA, UNSPECIFIED TYPE: ICD-10-CM

## 2020-01-13 DIAGNOSIS — M15.9 GENERALIZED OSTEOARTHROSIS, INVOLVING MULTIPLE SITES: ICD-10-CM

## 2020-01-13 DIAGNOSIS — I10 ESSENTIAL HYPERTENSION: ICD-10-CM

## 2020-01-13 DIAGNOSIS — Z00.00 MEDICARE ANNUAL WELLNESS VISIT, SUBSEQUENT: Primary | ICD-10-CM

## 2020-01-13 DIAGNOSIS — M47.817 LUMBOSACRAL SPONDYLOSIS WITHOUT MYELOPATHY: ICD-10-CM

## 2020-01-13 DIAGNOSIS — E78.2 MIXED HYPERLIPIDEMIA: ICD-10-CM

## 2020-01-13 LAB
ALBUMIN SERPL-MCNC: 3.9 G/DL (ref 3.5–5.2)
ALBUMIN/GLOB SERPL: 1.1 G/DL
ALP SERPL-CCNC: 79 U/L (ref 39–117)
ALT SERPL W P-5'-P-CCNC: 21 U/L (ref 1–41)
ANION GAP SERPL CALCULATED.3IONS-SCNC: 12 MMOL/L (ref 5–15)
AST SERPL-CCNC: 21 U/L (ref 1–40)
BILIRUB SERPL-MCNC: 0.6 MG/DL (ref 0.2–1.2)
BUN BLD-MCNC: 12 MG/DL (ref 8–23)
BUN/CREAT SERPL: 9.2 (ref 7–25)
CALCIUM SPEC-SCNC: 9.2 MG/DL (ref 8.6–10.5)
CHLORIDE SERPL-SCNC: 102 MMOL/L (ref 98–107)
CHOLEST SERPL-MCNC: 163 MG/DL (ref 0–200)
CO2 SERPL-SCNC: 25 MMOL/L (ref 22–29)
CREAT BLD-MCNC: 1.3 MG/DL (ref 0.76–1.27)
DEPRECATED RDW RBC AUTO: 43.8 FL (ref 37–54)
ERYTHROCYTE [DISTWIDTH] IN BLOOD BY AUTOMATED COUNT: 13.1 % (ref 12.3–15.4)
GFR SERPL CREATININE-BSD FRML MDRD: 54 ML/MIN/1.73
GLOBULIN UR ELPH-MCNC: 3.5 GM/DL
GLUCOSE BLD-MCNC: 93 MG/DL (ref 65–99)
HCT VFR BLD AUTO: 43 % (ref 37.5–51)
HDLC SERPL-MCNC: 32 MG/DL (ref 40–60)
HGB BLD-MCNC: 15 G/DL (ref 13–17.7)
LDLC SERPL CALC-MCNC: 84 MG/DL (ref 0–100)
LDLC/HDLC SERPL: 2.63 {RATIO}
MCH RBC QN AUTO: 31.9 PG (ref 26.6–33)
MCHC RBC AUTO-ENTMCNC: 34.9 G/DL (ref 31.5–35.7)
MCV RBC AUTO: 91.5 FL (ref 79–97)
PLATELET # BLD AUTO: 160 10*3/MM3 (ref 140–450)
PMV BLD AUTO: 11 FL (ref 6–12)
POTASSIUM BLD-SCNC: 4.3 MMOL/L (ref 3.5–5.2)
PROT SERPL-MCNC: 7.4 G/DL (ref 6–8.5)
PSA SERPL-MCNC: 0.46 NG/ML (ref 0–4)
RBC # BLD AUTO: 4.7 10*6/MM3 (ref 4.14–5.8)
SODIUM BLD-SCNC: 139 MMOL/L (ref 136–145)
TRIGL SERPL-MCNC: 235 MG/DL (ref 0–150)
URATE SERPL-MCNC: 4.4 MG/DL (ref 3.4–7)
VLDLC SERPL-MCNC: 47 MG/DL (ref 5–40)
WBC NRBC COR # BLD: 6.76 10*3/MM3 (ref 3.4–10.8)

## 2020-01-13 PROCEDURE — G0103 PSA SCREENING: HCPCS

## 2020-01-13 PROCEDURE — 85027 COMPLETE CBC AUTOMATED: CPT

## 2020-01-13 PROCEDURE — 84550 ASSAY OF BLOOD/URIC ACID: CPT

## 2020-01-13 PROCEDURE — 80061 LIPID PANEL: CPT

## 2020-01-13 PROCEDURE — 80053 COMPREHEN METABOLIC PANEL: CPT

## 2020-01-13 PROCEDURE — G0439 PPPS, SUBSEQ VISIT: HCPCS | Performed by: FAMILY MEDICINE

## 2020-01-13 PROCEDURE — 36415 COLL VENOUS BLD VENIPUNCTURE: CPT

## 2020-01-13 RX ORDER — HYDROCODONE BITARTRATE AND ACETAMINOPHEN 7.5; 325 MG/1; MG/1
1 TABLET ORAL EVERY 6 HOURS PRN
Qty: 90 TABLET | Refills: 0 | Status: SHIPPED | OUTPATIENT
Start: 2020-01-13 | End: 2020-01-17 | Stop reason: SDUPTHER

## 2020-01-13 RX ORDER — TEMAZEPAM 15 MG/1
15 CAPSULE ORAL NIGHTLY PRN
Qty: 30 CAPSULE | Refills: 5 | Status: SHIPPED | OUTPATIENT
Start: 2020-01-13 | End: 2020-07-13 | Stop reason: SDUPTHER

## 2020-01-13 NOTE — PROGRESS NOTES
The ABCs of the Annual Wellness Visit  Subsequent Medicare Wellness Visit    Chief Complaint   Patient presents with   • Annual Exam     Medicare wellness   Pt is fasting today   • Osteoarthritis   • Back Pain   • Benign Prostatic Hypertrophy   • Hypertension       Subjective   History of Present Illness:  Papito Salinas is a 74 y.o. male who presents for a Subsequent Medicare Wellness Visit.    HEALTH RISK ASSESSMENT    Recent Hospitalizations:  No hospitalization(s) within the last year.    Current Medical Providers:  Patient Care Team:  Dick Brewster MD as PCP - General  Dick Brewster MD as PCP - Claims Attributed    Smoking Status:  Social History     Tobacco Use   Smoking Status Current Every Day Smoker   • Packs/day: 0.50   • Years: 50.00   • Pack years: 25.00   • Types: Cigarettes   Smokeless Tobacco Former User   • Types: Chew       Alcohol Consumption:  Social History     Substance and Sexual Activity   Alcohol Use Yes    Comment: occasional       Depression Screen:   PHQ-2/PHQ-9 Depression Screening 1/13/2020   Little interest or pleasure in doing things 0   Feeling down, depressed, or hopeless 0   Total Score 0       Fall Risk Screen:  MARII Fall Risk Assessment was completed, and patient is at LOW risk for falls.Assessment completed on:1/13/2020    Health Habits and Functional and Cognitive Screening:  Functional & Cognitive Status 1/13/2020   Do you have difficulty preparing food and eating? No   Do you have difficulty bathing yourself, getting dressed or grooming yourself? No   Do you have difficulty using the toilet? No   Do you have difficulty moving around from place to place? No   Do you have trouble with steps or getting out of a bed or a chair? No   Current Diet Well Balanced Diet   Dental Exam Not up to date   Eye Exam Not up to date   Exercise (times per week) 0 times per week   Current Exercise Activities Include None   Do you need help using the phone?  No    Are you deaf or do you have serious difficulty hearing?  No   Do you need help with transportation? No   Do you need help shopping? No   Do you need help preparing meals?  No   Do you need help with housework?  No   Do you need help with laundry? No   Do you need help taking your medications? No   Do you need help managing money? No   Do you ever drive or ride in a car without wearing a seat belt? No         Does the patient have evidence of cognitive impairment? No    Asprin use counseling:Does not need ASA (and currently is not on it)    Age-appropriate Screening Schedule:  Refer to the list below for future screening recommendations based on patient's age, sex and/or medical conditions. Orders for these recommended tests are listed in the plan section. The patient has been provided with a written plan.    Health Maintenance   Topic Date Due   • TDAP/TD VACCINES (1 - Tdap) 10/10/1956   • ZOSTER VACCINE (1 of 2) 10/10/1996   • LIPID PANEL  09/12/2019   • COLONOSCOPY  09/19/2027   • INFLUENZA VACCINE  Completed          The following portions of the patient's history were reviewed and updated as appropriate: allergies, current medications, past family history, past medical history, past social history, past surgical history and problem list.    Outpatient Medications Prior to Visit   Medication Sig Dispense Refill   • allopurinol (ZYLOPRIM) 300 MG tablet Take 1 tablet by mouth Daily. 90 tablet 3   • amitriptyline (ELAVIL) 10 MG tablet Take 1 tablet by mouth Every Night. 90 tablet 1   • doxazosin (CARDURA) 4 MG tablet TAKE ONE TAB BY MOUTH NIGHTLY 90 tablet 2   • FLUZONE HIGH-DOSE 0.5 ML suspension prefilled syringe injection TO BE ADMINISTERED BY PHARMACIST FOR IMMUNIZATION  0   • LIALDA 1.2 G EC tablet Take 1 tablet by mouth 2 (Two) Times a Day.  3   • losartan (COZAAR) 100 MG tablet Take 1 tablet by mouth Daily. 90 tablet 3   • metoprolol succinate XL (TOPROL-XL) 100 MG 24 hr tablet Take 1 tablet by mouth Daily.  "90 tablet 3   • omeprazole (priLOSEC) 40 MG capsule Take 1 capsule by mouth Daily. 90 capsule 3   • predniSONE (DELTASONE) 20 MG tablet TAKE 1 TABLET BY MOUTH EVERY DAY AS NEEDED FOR ACUTE GOUT FLARE UP 30 tablet 2   • PREVNAR 13 vaccine TO BE ADMINISTERED BY PHARMACIST FOR IMMUNIZATION  0   • HYDROcodone-acetaminophen (NORCO) 7.5-325 MG per tablet Take 1 tablet by mouth Every 6 (Six) Hours As Needed for Moderate Pain . 90 tablet 0   • temazepam (RESTORIL) 15 MG capsule Take 1 capsule by mouth At Night As Needed for Sleep. 30 capsule 5     No facility-administered medications prior to visit.        Patient Active Problem List   Diagnosis   • Lumbosacral spondylosis without myelopathy   • Generalized osteoarthrosis, involving multiple sites   • Gout   • Esophageal reflux   • Hypertrophy of prostate without urinary obstruction   • Tobacco abuse       Advanced Care Planning:  Patient does not have an advance directive - not interested in additional information    Review of Systems    Compared to one year ago, the patient feels his physical health is the same.  Compared to one year ago, the patient feels his mental health is the same.    Reviewed chart for potential of high risk medication in the elderly: yes  Reviewed chart for potential of harmful drug interactions in the elderly:yes    Objective         Vitals:    01/13/20 1036   BP: 130/70   Pulse: 64   Temp: 98 °F (36.7 °C)   SpO2: 97%   Weight: 116 kg (256 lb)   Height: 185.4 cm (72.99\")   PainSc: 0-No pain       Body mass index is 33.78 kg/m².  Discussed the patient's BMI with him. The BMI is above average; BMI management plan is completed.    Physical Exam          Assessment/Plan   Medicare Risks and Personalized Health Plan  CMS Preventative Services Quick Reference  Chronic Pain   Immunizations Discussed/Encouraged (specific immunizations; Shingrix )  Obesity/Overweight     The above risks/problems have been discussed with the patient.  Pertinent information " has been shared with the patient in the After Visit Summary.  Follow up plans and orders are seen below in the Assessment/Plan Section.    Diagnoses and all orders for this visit:    1. Medicare annual wellness visit, subsequent (Primary)    2. Medication monitoring encounter  -     CBC (No Diff); Future  -     Comprehensive Metabolic Panel; Future    3. Prostate cancer screening  -     PSA Screen; Future    4. Chronic bilateral low back pain with right-sided sciatica  -     CBC (No Diff); Future  -     Comprehensive Metabolic Panel; Future  -     Uric Acid; Future  -     HYDROcodone-acetaminophen (NORCO) 7.5-325 MG per tablet; Take 1 tablet by mouth Every 6 (Six) Hours As Needed for Moderate Pain .  Dispense: 90 tablet; Refill: 0    5. Generalized osteoarthrosis, involving multiple sites  -     HYDROcodone-acetaminophen (NORCO) 7.5-325 MG per tablet; Take 1 tablet by mouth Every 6 (Six) Hours As Needed for Moderate Pain .  Dispense: 90 tablet; Refill: 0    6. Insomnia, unspecified type  -     temazepam (RESTORIL) 15 MG capsule; Take 1 capsule by mouth At Night As Needed for Sleep.  Dispense: 30 capsule; Refill: 5    7. Essential hypertension  -     Comprehensive Metabolic Panel; Future  -     Lipid Panel; Future    8. Idiopathic gout, unspecified chronicity, unspecified site  -     Comprehensive Metabolic Panel; Future  -     Uric Acid; Future    9. Edema, unspecified type  -     Comprehensive Metabolic Panel; Future    10. Mixed hyperlipidemia  -     Comprehensive Metabolic Panel; Future  -     Lipid Panel; Future    11. Lumbosacral spondylosis without myelopathy      Follow Up:  Return in about 6 months (around 7/13/2020) for Recheck.     An After Visit Summary and PPPS were given to the patient.

## 2020-01-17 DIAGNOSIS — G89.29 CHRONIC BILATERAL LOW BACK PAIN WITH RIGHT-SIDED SCIATICA: ICD-10-CM

## 2020-01-17 DIAGNOSIS — M54.41 CHRONIC BILATERAL LOW BACK PAIN WITH RIGHT-SIDED SCIATICA: ICD-10-CM

## 2020-01-17 DIAGNOSIS — M15.9 GENERALIZED OSTEOARTHROSIS, INVOLVING MULTIPLE SITES: ICD-10-CM

## 2020-01-17 RX ORDER — HYDROCODONE BITARTRATE AND ACETAMINOPHEN 7.5; 325 MG/1; MG/1
1 TABLET ORAL EVERY 6 HOURS PRN
Qty: 90 TABLET | Refills: 0 | Status: SHIPPED | OUTPATIENT
Start: 2020-01-17 | End: 2020-07-13 | Stop reason: SDUPTHER

## 2020-01-23 ENCOUNTER — TELEPHONE (OUTPATIENT)
Dept: FAMILY MEDICINE CLINIC | Facility: CLINIC | Age: 75
End: 2020-01-23

## 2020-01-23 NOTE — TELEPHONE ENCOUNTER
PT IS CALLING ASKING IF DR. COREA CALLED HIS INSURANCE ABOUT  HYDROcodone-acetaminophen (NORCO) 7.5-325 MG per tablet  HIS INSURANCE COMPANY WONT PAY FOR IT.  THEY NEED A REASON WHY HE TAKES THIS MED     PLEASE ADVISE AND GIVE CALL

## 2020-01-23 NOTE — TELEPHONE ENCOUNTER
Patient does have moderate to severe chronic back pain that requires opioids.  That is what we have been using his pain medicine for for many years

## 2020-01-23 NOTE — TELEPHONE ENCOUNTER
Patient's PA was denied by his insurance:   Your Opioids IR - Combo Products Acute Pain Duration Limit criteria covers additional quantities of this  drug when you have one of these conditions:  - Pain due to cancer or a terminal condition  - Pain being managed through hospice or palliative care  - Moderate to severe chronic pain that requires opioids  - Acute pain that requires opioids for more than 7 days  Your use of this drug does not meet the requirement. This is based on the information we have.    Please advise if you would like for me to submit an appeal or what you would like for me to do?

## 2020-02-11 RX ORDER — AMITRIPTYLINE HYDROCHLORIDE 10 MG/1
TABLET, FILM COATED ORAL
Qty: 90 TABLET | Refills: 1 | Status: SHIPPED | OUTPATIENT
Start: 2020-02-11 | End: 2020-08-20

## 2020-03-25 DIAGNOSIS — I10 ESSENTIAL HYPERTENSION: ICD-10-CM

## 2020-03-27 ENCOUNTER — TELEPHONE (OUTPATIENT)
Dept: FAMILY MEDICINE CLINIC | Facility: CLINIC | Age: 75
End: 2020-03-27

## 2020-03-27 DIAGNOSIS — I10 ESSENTIAL HYPERTENSION: ICD-10-CM

## 2020-03-27 RX ORDER — DOXAZOSIN MESYLATE 4 MG/1
TABLET ORAL
Qty: 90 TABLET | Refills: 2 | Status: SHIPPED | OUTPATIENT
Start: 2020-03-27 | End: 2020-10-21

## 2020-03-27 RX ORDER — DOXAZOSIN MESYLATE 4 MG/1
4 TABLET ORAL NIGHTLY
Qty: 90 TABLET | Refills: 2 | Status: SHIPPED | OUTPATIENT
Start: 2020-03-27 | End: 2020-10-21

## 2020-03-27 NOTE — TELEPHONE ENCOUNTER
Pt requesting refill on  doxazosin (CARDURA) 4 MG tablet   Please advise    [facility]    Pt can be reached at [phone]

## 2020-04-22 ENCOUNTER — TELEPHONE (OUTPATIENT)
Dept: FAMILY MEDICINE CLINIC | Facility: CLINIC | Age: 75
End: 2020-04-22

## 2020-04-22 RX ORDER — VALSARTAN 160 MG/1
160 TABLET ORAL DAILY
Qty: 30 TABLET | Refills: 11 | Status: SHIPPED | OUTPATIENT
Start: 2020-04-22 | End: 2020-04-22 | Stop reason: SDUPTHER

## 2020-04-22 RX ORDER — VALSARTAN 160 MG/1
160 TABLET ORAL DAILY
Qty: 30 TABLET | Refills: 11 | Status: SHIPPED | OUTPATIENT
Start: 2020-04-22 | End: 2021-02-19

## 2020-04-22 NOTE — TELEPHONE ENCOUNTER
PT CALLED STATING THAT HIS PHARMACY ([facility] [address]) TOLD HIM THE LOSARTAN 100 MG WAS ON BACK ORDER AND THERE WAS A RECALL ON IT AND SAID THE PT NEEDED A REPLACEMENT FOR THE LOSARTAN. PLEASE ADVISE THANSK

## 2020-07-13 ENCOUNTER — OFFICE VISIT (OUTPATIENT)
Dept: FAMILY MEDICINE CLINIC | Facility: CLINIC | Age: 75
End: 2020-07-13

## 2020-07-13 VITALS
DIASTOLIC BLOOD PRESSURE: 80 MMHG | SYSTOLIC BLOOD PRESSURE: 138 MMHG | BODY MASS INDEX: 33.53 KG/M2 | HEART RATE: 65 BPM | WEIGHT: 253 LBS | HEIGHT: 73 IN | OXYGEN SATURATION: 98 % | TEMPERATURE: 97.7 F

## 2020-07-13 DIAGNOSIS — M15.9 GENERALIZED OSTEOARTHROSIS, INVOLVING MULTIPLE SITES: ICD-10-CM

## 2020-07-13 DIAGNOSIS — M54.41 CHRONIC BILATERAL LOW BACK PAIN WITH RIGHT-SIDED SCIATICA: ICD-10-CM

## 2020-07-13 DIAGNOSIS — G89.29 CHRONIC BILATERAL LOW BACK PAIN WITH RIGHT-SIDED SCIATICA: ICD-10-CM

## 2020-07-13 DIAGNOSIS — M10.00 IDIOPATHIC GOUT, UNSPECIFIED CHRONICITY, UNSPECIFIED SITE: ICD-10-CM

## 2020-07-13 DIAGNOSIS — I10 ESSENTIAL HYPERTENSION: Primary | ICD-10-CM

## 2020-07-13 DIAGNOSIS — G47.00 INSOMNIA, UNSPECIFIED TYPE: ICD-10-CM

## 2020-07-13 PROCEDURE — 99214 OFFICE O/P EST MOD 30 MIN: CPT | Performed by: FAMILY MEDICINE

## 2020-07-13 RX ORDER — TEMAZEPAM 15 MG/1
15 CAPSULE ORAL NIGHTLY PRN
Qty: 30 CAPSULE | Refills: 5 | Status: SHIPPED | OUTPATIENT
Start: 2020-07-13 | End: 2021-01-25 | Stop reason: SDUPTHER

## 2020-07-13 RX ORDER — HYDROCODONE BITARTRATE AND ACETAMINOPHEN 7.5; 325 MG/1; MG/1
1 TABLET ORAL EVERY 6 HOURS PRN
Qty: 90 TABLET | Refills: 0 | Status: SHIPPED | OUTPATIENT
Start: 2020-07-13 | End: 2021-07-15

## 2020-07-15 DIAGNOSIS — I10 ESSENTIAL HYPERTENSION: ICD-10-CM

## 2020-07-15 RX ORDER — METOPROLOL SUCCINATE 100 MG/1
TABLET, EXTENDED RELEASE ORAL
Qty: 90 TABLET | Refills: 3 | Status: SHIPPED | OUTPATIENT
Start: 2020-07-15 | End: 2021-07-13

## 2020-08-18 DIAGNOSIS — M10.00 IDIOPATHIC GOUT, UNSPECIFIED CHRONICITY, UNSPECIFIED SITE: ICD-10-CM

## 2020-08-20 DIAGNOSIS — M10.00 IDIOPATHIC GOUT, UNSPECIFIED CHRONICITY, UNSPECIFIED SITE: ICD-10-CM

## 2020-08-20 RX ORDER — ALLOPURINOL 300 MG/1
300 TABLET ORAL DAILY
Qty: 90 TABLET | Refills: 3 | Status: SHIPPED | OUTPATIENT
Start: 2020-08-20 | End: 2021-07-15 | Stop reason: SDUPTHER

## 2020-08-20 RX ORDER — OMEPRAZOLE 40 MG/1
CAPSULE, DELAYED RELEASE ORAL
Qty: 90 CAPSULE | Refills: 3 | Status: SHIPPED | OUTPATIENT
Start: 2020-08-20 | End: 2021-07-15 | Stop reason: SDUPTHER

## 2020-08-20 RX ORDER — ALLOPURINOL 300 MG/1
TABLET ORAL
Qty: 90 TABLET | Refills: 3 | OUTPATIENT
Start: 2020-08-20

## 2020-08-20 RX ORDER — AMITRIPTYLINE HYDROCHLORIDE 10 MG/1
TABLET, FILM COATED ORAL
Qty: 90 TABLET | Refills: 3 | Status: SHIPPED | OUTPATIENT
Start: 2020-08-20 | End: 2021-07-15 | Stop reason: SDUPTHER

## 2020-08-20 NOTE — TELEPHONE ENCOUNTER
PT CALLED TO REQUEST REFILL FOR RX  allopurinol (ZYLOPRIM) 300 MG tablet.    PLEASE ADVISE.  CALL BACK:[phone]       [id number] - [place], [place] - [address] [place] [address] -

## 2020-09-01 NOTE — TELEPHONE ENCOUNTER
Airway  Urgency: elective    Date/Time: 9/1/2020 7:27 AM  Airway not difficult    General Information and Staff    Patient location during procedure: OR  CRNA: Kiana Hernandez CRNA    Indications and Patient Condition  Indications for airway management: airway protection    Preoxygenated: yes  Mask difficulty assessment: 0 - not attempted    Final Airway Details  Final airway type: endotracheal airway      Successful airway: ETT  Cuffed: yes   Successful intubation technique: direct laryngoscopy  Facilitating devices/methods: intubating stylet and anterior pressure/BURP  Endotracheal tube insertion site: oral  Blade: Wild  Blade size: 2  ETT size (mm): 7.5  Cormack-Lehane Classification: grade IIa - partial view of glottis  Placement verified by: chest auscultation and capnometry   Cuff volume (mL): 8  Measured from: lips  ETT/EBT  to lips (cm): 22  Number of attempts at approach: 1  Assessment: lips, teeth, and gum same as pre-op and atraumatic intubation    Additional Comments  Atraumatic             Spoke with Barbie at [facility] and submitted a new authorization. She was able to get this approved for him for a total of 12 months. LVM at [phone] for the patient to return our call, office number was provided

## 2020-10-16 DIAGNOSIS — I10 ESSENTIAL HYPERTENSION: ICD-10-CM

## 2020-10-21 RX ORDER — DOXAZOSIN MESYLATE 4 MG/1
TABLET ORAL
Qty: 90 TABLET | Refills: 3 | Status: SHIPPED | OUTPATIENT
Start: 2020-10-21 | End: 2021-07-15 | Stop reason: SDUPTHER

## 2021-01-21 ENCOUNTER — TELEPHONE (OUTPATIENT)
Dept: FAMILY MEDICINE CLINIC | Facility: CLINIC | Age: 76
End: 2021-01-21

## 2021-01-21 NOTE — TELEPHONE ENCOUNTER
Caller: Papito Salinas    Relationship to patient: Self    Best call back number:[phone]    Chief complaint: PATIENT CANCELED APPT FOR Monday AND WANTS IT TO BE VIRTUAL AS HIS SPOUSE CAME HOME FROM HOSPITAL AND HE DOESN'T WANT TO GET THE VIRUS    Type of visit: FOLLOWUP    Requested date: 012521

## 2021-01-25 ENCOUNTER — TELEMEDICINE (OUTPATIENT)
Dept: FAMILY MEDICINE CLINIC | Facility: CLINIC | Age: 76
End: 2021-01-25

## 2021-01-25 DIAGNOSIS — G89.29 CHRONIC BILATERAL LOW BACK PAIN WITH RIGHT-SIDED SCIATICA: ICD-10-CM

## 2021-01-25 DIAGNOSIS — G47.00 INSOMNIA, UNSPECIFIED TYPE: ICD-10-CM

## 2021-01-25 DIAGNOSIS — Z51.81 MEDICATION MONITORING ENCOUNTER: ICD-10-CM

## 2021-01-25 DIAGNOSIS — M10.00 IDIOPATHIC GOUT, UNSPECIFIED CHRONICITY, UNSPECIFIED SITE: ICD-10-CM

## 2021-01-25 DIAGNOSIS — I10 ESSENTIAL HYPERTENSION: Primary | ICD-10-CM

## 2021-01-25 DIAGNOSIS — Z12.5 PROSTATE CANCER SCREENING: ICD-10-CM

## 2021-01-25 DIAGNOSIS — E78.2 MIXED HYPERLIPIDEMIA: ICD-10-CM

## 2021-01-25 DIAGNOSIS — M54.41 CHRONIC BILATERAL LOW BACK PAIN WITH RIGHT-SIDED SCIATICA: ICD-10-CM

## 2021-01-25 PROCEDURE — 99213 OFFICE O/P EST LOW 20 MIN: CPT | Performed by: FAMILY MEDICINE

## 2021-01-25 RX ORDER — PREDNISONE 20 MG/1
20 TABLET ORAL DAILY
Qty: 30 TABLET | Refills: 2 | Status: SHIPPED | OUTPATIENT
Start: 2021-01-25 | End: 2021-07-15

## 2021-01-25 RX ORDER — TEMAZEPAM 15 MG/1
15 CAPSULE ORAL NIGHTLY PRN
Qty: 30 CAPSULE | Refills: 5 | Status: SHIPPED | OUTPATIENT
Start: 2021-01-25 | End: 2021-07-15 | Stop reason: SDUPTHER

## 2021-01-25 NOTE — PROGRESS NOTES
Subjective   Papito Salinas is a 75 y.o. male   Patient requests and consents to telemedicine visit using audiovisual aid    Chief Complaint    Hypertension  Gout  Insomnia  Hyperlipidemia    History of Present Illness  Patient presents today via telemedicine visit for periodic follow-up regarding multiple medical problems including hypertension, gout, insomnia and hyperlipidemia.  He generally gets his lab studies done in January of each year.  He is due for prostate cancer screening labs also.  We will get these ordered for him.  He reports that he feels well.  He is tired of being cooped up with coronavirus and cold weather.  He says he would rather be out golfing.  He has no acute complaints    The following portions of the patient's history were reviewed and updated as appropriate: allergies, current medications, past social history and problem list    Review of Systems   Constitutional: Negative.  Negative for chills, fatigue, fever and unexpected weight change.   Respiratory: Negative.  Negative for cough, chest tightness, shortness of breath and wheezing.    Cardiovascular: Negative for chest pain, palpitations and leg swelling.   Gastrointestinal: Negative.  Negative for abdominal pain, nausea and vomiting.   Endocrine: Negative for polydipsia, polyphagia and polyuria.   Genitourinary: Negative for dysuria, frequency and urgency.   Musculoskeletal: Negative for arthralgias, back pain, gait problem and myalgias.   Skin: Negative for color change and rash.   Neurological: Negative for dizziness, tremors, syncope, weakness, numbness and headaches.   Hematological: Negative for adenopathy. Does not bruise/bleed easily.   Psychiatric/Behavioral: Negative for behavioral problems and dysphoric mood. The patient is not nervous/anxious.        Objective     There were no vitals filed for this visit.    Physical Exam  Vitals signs and nursing note reviewed.   Constitutional:       Appearance: He is  well-developed.   HENT:      Head: Normocephalic.   Eyes:      General: No scleral icterus.     Conjunctiva/sclera: Conjunctivae normal.      Pupils: Pupils are equal, round, and reactive to light.   Neck:      Musculoskeletal: Neck supple.      Thyroid: No thyromegaly.      Vascular: No JVD.   Cardiovascular:      Rate and Rhythm: Normal rate and regular rhythm.      Pulses: Normal pulses.      Heart sounds: Normal heart sounds. No murmur.   Pulmonary:      Effort: Pulmonary effort is normal. No respiratory distress.      Breath sounds: Normal breath sounds.   Abdominal:      General: Bowel sounds are normal.      Palpations: Abdomen is soft.      Tenderness: There is no abdominal tenderness.   Musculoskeletal:      Lumbar back: He exhibits decreased range of motion, tenderness, bony tenderness and pain. He exhibits no swelling, no deformity and no spasm.   Lymphadenopathy:      Cervical: No cervical adenopathy.   Skin:     General: Skin is warm and dry.      Findings: No rash.   Neurological:      Mental Status: He is alert and oriented to person, place, and time.      Deep Tendon Reflexes: Reflexes are normal and symmetric.   Psychiatric:         Mood and Affect: Mood normal.         Behavior: Behavior normal.         Assessment/Plan   Problems Addressed this Visit        Musculoskeletal and Injuries    Gout    Relevant Medications    predniSONE (DELTASONE) 20 MG tablet    Other Relevant Orders    Uric Acid      Other Visit Diagnoses     Essential hypertension    -  Primary    Relevant Orders    Comprehensive Metabolic Panel    CBC (No Diff)    Insomnia, unspecified type        Relevant Medications    temazepam (RESTORIL) 15 MG capsule    Other Relevant Orders    Comprehensive Metabolic Panel    CBC (No Diff)    Chronic bilateral low back pain with right-sided sciatica        Relevant Medications    predniSONE (DELTASONE) 20 MG tablet    Medication monitoring encounter        Relevant Orders    Comprehensive  Metabolic Panel    CBC (No Diff)    Prostate cancer screening        Relevant Orders    PSA Screen    Mixed hyperlipidemia        Relevant Orders    Comprehensive Metabolic Panel    Lipid Panel      Diagnoses       Codes Comments    Essential hypertension    -  Primary ICD-10-CM: I10  ICD-9-CM: 401.9     Insomnia, unspecified type     ICD-10-CM: G47.00  ICD-9-CM: 780.52     Idiopathic gout, unspecified chronicity, unspecified site     ICD-10-CM: M10.00  ICD-9-CM: 274.9     Chronic bilateral low back pain with right-sided sciatica     ICD-10-CM: M54.41, G89.29  ICD-9-CM: 724.2, 724.3, 338.29     Medication monitoring encounter     ICD-10-CM: Z51.81  ICD-9-CM: V58.83     Prostate cancer screening     ICD-10-CM: Z12.5  ICD-9-CM: V76.44     Mixed hyperlipidemia     ICD-10-CM: E78.2  ICD-9-CM: 272.2         Telemedicine visit 25 minutes

## 2021-01-27 ENCOUNTER — LAB (OUTPATIENT)
Dept: LAB | Facility: HOSPITAL | Age: 76
End: 2021-01-27

## 2021-01-27 DIAGNOSIS — Z12.5 PROSTATE CANCER SCREENING: ICD-10-CM

## 2021-01-27 DIAGNOSIS — M10.00 IDIOPATHIC GOUT, UNSPECIFIED CHRONICITY, UNSPECIFIED SITE: ICD-10-CM

## 2021-01-27 DIAGNOSIS — I10 ESSENTIAL HYPERTENSION: ICD-10-CM

## 2021-01-27 DIAGNOSIS — E78.2 MIXED HYPERLIPIDEMIA: ICD-10-CM

## 2021-01-27 DIAGNOSIS — G47.00 INSOMNIA, UNSPECIFIED TYPE: ICD-10-CM

## 2021-01-27 DIAGNOSIS — Z51.81 MEDICATION MONITORING ENCOUNTER: ICD-10-CM

## 2021-01-27 LAB
ALBUMIN SERPL-MCNC: 3.8 G/DL (ref 3.5–5.2)
ALBUMIN/GLOB SERPL: 1.2 G/DL
ALP SERPL-CCNC: 103 U/L (ref 39–117)
ALT SERPL W P-5'-P-CCNC: 23 U/L (ref 1–41)
ANION GAP SERPL CALCULATED.3IONS-SCNC: 8.7 MMOL/L (ref 5–15)
AST SERPL-CCNC: 23 U/L (ref 1–40)
BILIRUB SERPL-MCNC: 0.5 MG/DL (ref 0–1.2)
BUN SERPL-MCNC: 14 MG/DL (ref 8–23)
BUN/CREAT SERPL: 15.1 (ref 7–25)
CALCIUM SPEC-SCNC: 9.3 MG/DL (ref 8.6–10.5)
CHLORIDE SERPL-SCNC: 106 MMOL/L (ref 98–107)
CHOLEST SERPL-MCNC: 145 MG/DL (ref 0–200)
CO2 SERPL-SCNC: 26.3 MMOL/L (ref 22–29)
CREAT SERPL-MCNC: 0.93 MG/DL (ref 0.76–1.27)
DEPRECATED RDW RBC AUTO: 42.4 FL (ref 37–54)
ERYTHROCYTE [DISTWIDTH] IN BLOOD BY AUTOMATED COUNT: 13 % (ref 12.3–15.4)
GFR SERPL CREATININE-BSD FRML MDRD: 79 ML/MIN/1.73
GLOBULIN UR ELPH-MCNC: 3.3 GM/DL
GLUCOSE SERPL-MCNC: 81 MG/DL (ref 65–99)
HCT VFR BLD AUTO: 43.3 % (ref 37.5–51)
HDLC SERPL-MCNC: 28 MG/DL (ref 40–60)
HGB BLD-MCNC: 15.3 G/DL (ref 13–17.7)
LDLC SERPL CALC-MCNC: 73 MG/DL (ref 0–100)
LDLC/HDLC SERPL: 2.27 {RATIO}
MCH RBC QN AUTO: 31.8 PG (ref 26.6–33)
MCHC RBC AUTO-ENTMCNC: 35.3 G/DL (ref 31.5–35.7)
MCV RBC AUTO: 90 FL (ref 79–97)
PLATELET # BLD AUTO: 173 10*3/MM3 (ref 140–450)
PMV BLD AUTO: 11 FL (ref 6–12)
POTASSIUM SERPL-SCNC: 4.3 MMOL/L (ref 3.5–5.2)
PROT SERPL-MCNC: 7.1 G/DL (ref 6–8.5)
PSA SERPL-MCNC: 0.45 NG/ML (ref 0–4)
RBC # BLD AUTO: 4.81 10*6/MM3 (ref 4.14–5.8)
SODIUM SERPL-SCNC: 141 MMOL/L (ref 136–145)
TRIGL SERPL-MCNC: 267 MG/DL (ref 0–150)
URATE SERPL-MCNC: 4.6 MG/DL (ref 3.4–7)
VLDLC SERPL-MCNC: 44 MG/DL (ref 5–40)
WBC # BLD AUTO: 6.01 10*3/MM3 (ref 3.4–10.8)

## 2021-01-27 PROCEDURE — 85027 COMPLETE CBC AUTOMATED: CPT

## 2021-01-27 PROCEDURE — 80053 COMPREHEN METABOLIC PANEL: CPT

## 2021-01-27 PROCEDURE — G0103 PSA SCREENING: HCPCS

## 2021-01-27 PROCEDURE — 84550 ASSAY OF BLOOD/URIC ACID: CPT

## 2021-01-27 PROCEDURE — 80061 LIPID PANEL: CPT

## 2021-01-27 PROCEDURE — 36415 COLL VENOUS BLD VENIPUNCTURE: CPT

## 2021-02-19 DIAGNOSIS — I10 ESSENTIAL HYPERTENSION: Primary | ICD-10-CM

## 2021-02-19 RX ORDER — VALSARTAN 160 MG/1
TABLET ORAL
Qty: 90 TABLET | Refills: 3 | Status: SHIPPED | OUTPATIENT
Start: 2021-02-19 | End: 2021-07-15 | Stop reason: SDUPTHER

## 2021-07-10 DIAGNOSIS — I10 ESSENTIAL HYPERTENSION: ICD-10-CM

## 2021-07-13 RX ORDER — METOPROLOL SUCCINATE 100 MG/1
TABLET, EXTENDED RELEASE ORAL
Qty: 90 TABLET | Refills: 3 | Status: SHIPPED | OUTPATIENT
Start: 2021-07-13 | End: 2022-07-05

## 2021-07-15 ENCOUNTER — OFFICE VISIT (OUTPATIENT)
Dept: FAMILY MEDICINE CLINIC | Facility: CLINIC | Age: 76
End: 2021-07-15

## 2021-07-15 VITALS
BODY MASS INDEX: 32.39 KG/M2 | RESPIRATION RATE: 15 BRPM | HEART RATE: 67 BPM | SYSTOLIC BLOOD PRESSURE: 130 MMHG | HEIGHT: 74 IN | OXYGEN SATURATION: 98 % | DIASTOLIC BLOOD PRESSURE: 90 MMHG | WEIGHT: 252.4 LBS | TEMPERATURE: 97.3 F

## 2021-07-15 DIAGNOSIS — I10 ESSENTIAL HYPERTENSION: Primary | ICD-10-CM

## 2021-07-15 DIAGNOSIS — K21.9 GASTROESOPHAGEAL REFLUX DISEASE, UNSPECIFIED WHETHER ESOPHAGITIS PRESENT: ICD-10-CM

## 2021-07-15 DIAGNOSIS — N40.0 HYPERTROPHY OF PROSTATE WITHOUT URINARY OBSTRUCTION: ICD-10-CM

## 2021-07-15 DIAGNOSIS — M54.41 CHRONIC BILATERAL LOW BACK PAIN WITH RIGHT-SIDED SCIATICA: ICD-10-CM

## 2021-07-15 DIAGNOSIS — G47.00 INSOMNIA, UNSPECIFIED TYPE: ICD-10-CM

## 2021-07-15 DIAGNOSIS — M15.9 GENERALIZED OSTEOARTHROSIS, INVOLVING MULTIPLE SITES: ICD-10-CM

## 2021-07-15 DIAGNOSIS — G89.29 CHRONIC BILATERAL LOW BACK PAIN WITH RIGHT-SIDED SCIATICA: ICD-10-CM

## 2021-07-15 DIAGNOSIS — M10.00 IDIOPATHIC GOUT, UNSPECIFIED CHRONICITY, UNSPECIFIED SITE: ICD-10-CM

## 2021-07-15 DIAGNOSIS — M47.817 LUMBOSACRAL SPONDYLOSIS WITHOUT MYELOPATHY: ICD-10-CM

## 2021-07-15 PROCEDURE — 99214 OFFICE O/P EST MOD 30 MIN: CPT | Performed by: FAMILY MEDICINE

## 2021-07-15 RX ORDER — OMEPRAZOLE 40 MG/1
40 CAPSULE, DELAYED RELEASE ORAL DAILY
Qty: 90 CAPSULE | Refills: 3 | Status: SHIPPED | OUTPATIENT
Start: 2021-07-15 | End: 2022-07-20

## 2021-07-15 RX ORDER — DOXAZOSIN MESYLATE 4 MG/1
4 TABLET ORAL
Qty: 90 TABLET | Refills: 3 | Status: SHIPPED | OUTPATIENT
Start: 2021-07-15 | End: 2022-07-20

## 2021-07-15 RX ORDER — TEMAZEPAM 15 MG/1
15 CAPSULE ORAL NIGHTLY PRN
Qty: 30 CAPSULE | Refills: 5 | Status: SHIPPED | OUTPATIENT
Start: 2021-07-15 | End: 2022-01-17 | Stop reason: SDUPTHER

## 2021-07-15 RX ORDER — AMITRIPTYLINE HYDROCHLORIDE 10 MG/1
10 TABLET, FILM COATED ORAL NIGHTLY
Qty: 90 TABLET | Refills: 3 | Status: SHIPPED | OUTPATIENT
Start: 2021-07-15 | End: 2022-07-20

## 2021-07-15 RX ORDER — VALSARTAN 160 MG/1
160 TABLET ORAL DAILY
Qty: 90 TABLET | Refills: 3 | Status: SHIPPED | OUTPATIENT
Start: 2021-07-15 | End: 2022-07-20

## 2021-07-15 RX ORDER — ALLOPURINOL 300 MG/1
300 TABLET ORAL DAILY
Qty: 90 TABLET | Refills: 3 | Status: SHIPPED | OUTPATIENT
Start: 2021-07-15 | End: 2022-07-05

## 2021-07-15 NOTE — PROGRESS NOTES
Subjective   Papito Salinas is a 75 y.o. male    Chief Complaint    Hypertension   Insomnia   Gout   Back pain   Medication refill     History of Present Illness  The patient presents for a 6-month recheck regarding the above-noted issues. He states he is due for refills on amitriptyline, doxazosin, and valsartan.    He reports he continues to get swelling in his feet and the bottom of his feet are numb. The patient reports they always swell up, but as if he stays on them. He notes he has spots that keep coming up. The patients he does not use sunscreen.     The patient takes a hydrocodone tablet when he plays golf. His last refill was a year ago and he thinks he has 30 left.     He reports he has cut back quite a bit on his smoking. He reports a pack of cigarettes will last 2 to 3 days.    The patient said his stomach is good. He has cut back on the omeprazole and takes 1 every other day to every 2 to 3 days. The patient takes Lialda twice a day.     He is only getting up once a night to urinate.     He reports he needs a refill of his omeprazole, allopurinol, metoprolol, and temazepam.    The following portions of the patient's history were reviewed and updated as appropriate: allergies, current medications, past social history and problem list    Review of Systems   Constitutional: Negative.  Negative for appetite change, fatigue and unexpected weight change.   Respiratory: Negative.  Negative for cough, chest tightness and shortness of breath.    Cardiovascular: Negative for chest pain, palpitations and leg swelling.   Gastrointestinal: Negative.  Negative for abdominal distention, abdominal pain, diarrhea and nausea.        Patient experiencing heartburn/acid reflux     Musculoskeletal: Positive for back pain. Negative for arthralgias, gait problem and myalgias.   Skin: Negative for color change and rash.   Neurological: Negative for dizziness, tremors, syncope, weakness, light-headedness, numbness and  headaches.   Psychiatric/Behavioral: Positive for sleep disturbance. Negative for agitation, behavioral problems, confusion, decreased concentration, dysphoric mood and hallucinations. The patient is not nervous/anxious and is not hyperactive.        Objective     Vitals:    07/15/21 1426   BP: 130/90   Pulse: 67   Resp: 15   Temp: 97.3 °F (36.3 °C)   SpO2: 98%       Physical Exam  Vitals and nursing note reviewed.   Constitutional:       General: He is not in acute distress.     Appearance: He is well-developed. He is not diaphoretic.   Eyes:      Conjunctiva/sclera: Conjunctivae normal.   Neck:      Vascular: No JVD.   Cardiovascular:      Rate and Rhythm: Normal rate and regular rhythm.      Pulses: Normal pulses.      Heart sounds: Normal heart sounds. No murmur heard.     Pulmonary:      Effort: Pulmonary effort is normal. No respiratory distress.      Breath sounds: Normal breath sounds.   Abdominal:      General: Bowel sounds are normal. There is no distension.      Palpations: Abdomen is soft. There is no mass.      Tenderness: There is no abdominal tenderness. There is no guarding or rebound.      Hernia: No hernia is present.   Musculoskeletal:      Lumbar back: Tenderness and bony tenderness present. No swelling, deformity or spasms. Decreased range of motion.   Skin:     General: Skin is warm and dry.   Neurological:      Mental Status: He is alert and oriented to person, place, and time.      Coordination: Coordination normal.      Deep Tendon Reflexes: Reflexes are normal and symmetric.   Psychiatric:         Attention and Perception: He is attentive.         Behavior: Behavior normal.         Thought Content: Thought content normal.         Judgment: Judgment normal.         Assessment/Plan   Problems Addressed this Visit        Gastrointestinal Abdominal     Esophageal reflux    Relevant Medications    omeprazole (priLOSEC) 40 MG capsule       Genitourinary and Reproductive     Hypertrophy of  prostate without urinary obstruction       Musculoskeletal and Injuries    Generalized osteoarthrosis, involving multiple sites    Gout    Relevant Medications    allopurinol (ZYLOPRIM) 300 MG tablet       Neuro    Lumbosacral spondylosis without myelopathy      Other Visit Diagnoses     Essential hypertension    -  Primary    Relevant Medications    doxazosin (CARDURA) 4 MG tablet    valsartan (DIOVAN) 160 MG tablet    Insomnia, unspecified type        Relevant Medications    temazepam (RESTORIL) 15 MG capsule    Chronic bilateral low back pain with right-sided sciatica          Diagnoses       Codes Comments    Essential hypertension    -  Primary ICD-10-CM: I10  ICD-9-CM: 401.9     Idiopathic gout, unspecified chronicity, unspecified site     ICD-10-CM: M10.00  ICD-9-CM: 274.9     Insomnia, unspecified type     ICD-10-CM: G47.00  ICD-9-CM: 780.52     Chronic bilateral low back pain with right-sided sciatica     ICD-10-CM: M54.41, G89.29  ICD-9-CM: 724.2, 724.3, 338.29     Generalized osteoarthrosis, involving multiple sites     ICD-10-CM: M15.9  ICD-9-CM: 715.09     Lumbosacral spondylosis without myelopathy     ICD-10-CM: M47.817  ICD-9-CM: 721.3     Hypertrophy of prostate without urinary obstruction     ICD-10-CM: N40.0  ICD-9-CM: 600.90     Gastroesophageal reflux disease, unspecified whether esophagitis present     ICD-10-CM: K21.9  ICD-9-CM: 530.81         Please note that portions of this document were completed with a voice recognition program. Efforts were made to edit the dictations, but occasionally words are mis-transcribed        Scribed for FREEDOM Brewster MD by Martha Piña.  07/15/21   15:17 EDT    I have personally performed the services described in this document as scribed by the above individual, and it is both accurate and complete.  FREEDOM Brewster MD  7/15/2021  15:18 EDT

## 2021-10-06 ENCOUNTER — TELEPHONE (OUTPATIENT)
Dept: FAMILY MEDICINE CLINIC | Facility: CLINIC | Age: 76
End: 2021-10-06

## 2021-10-06 DIAGNOSIS — M25.562 ACUTE PAIN OF LEFT KNEE: Primary | ICD-10-CM

## 2021-10-06 NOTE — TELEPHONE ENCOUNTER
Caller: Papito Salinas    Relationship: Self    Best call back number: [phone]    What is the medical concern/diagnosis: LEFT KNEE    What specialty or service is being requested: X-RAY     What is the provider, practice or medical service name:     What is the office location: [facility]    Any additional details: PATIENT FALL LAST WEDNESDAY 9/29/2021. HE'S KNEE GAVE OUT ON HIM. PLEASE CALL BACK

## 2021-10-07 ENCOUNTER — HOSPITAL ENCOUNTER (OUTPATIENT)
Dept: GENERAL RADIOLOGY | Facility: HOSPITAL | Age: 76
Discharge: HOME | End: 2021-10-07
Admitting: FAMILY MEDICINE

## 2021-10-07 DIAGNOSIS — M25.562 ACUTE PAIN OF LEFT KNEE: ICD-10-CM

## 2021-10-07 PROCEDURE — 73560 X-RAY EXAM OF KNEE 1 OR 2: CPT

## 2021-10-08 ENCOUNTER — TELEPHONE (OUTPATIENT)
Dept: FAMILY MEDICINE CLINIC | Facility: CLINIC | Age: 76
End: 2021-10-08

## 2021-10-08 DIAGNOSIS — S82.831A CLOSED FRACTURE OF PROXIMAL END OF RIGHT FIBULA, UNSPECIFIED FRACTURE MORPHOLOGY, INITIAL ENCOUNTER: Primary | ICD-10-CM

## 2021-10-08 DIAGNOSIS — G89.29 CHRONIC BILATERAL LOW BACK PAIN WITH RIGHT-SIDED SCIATICA: ICD-10-CM

## 2021-10-08 DIAGNOSIS — M54.41 CHRONIC BILATERAL LOW BACK PAIN WITH RIGHT-SIDED SCIATICA: ICD-10-CM

## 2021-10-08 RX ORDER — HYDROCODONE BITARTRATE AND ACETAMINOPHEN 7.5; 325 MG/1; MG/1
1 TABLET ORAL EVERY 6 HOURS PRN
Qty: 90 TABLET | Refills: 0 | Status: SHIPPED | OUTPATIENT
Start: 2021-10-08 | End: 2022-01-17 | Stop reason: SDUPTHER

## 2021-10-08 NOTE — TELEPHONE ENCOUNTER
Spoke with pt about left leg fracture; told him not to bear weight, use crutches and we'll get him into ortho, I told him if he hasn't heard anything by 10/11 at lunch to call our office. He verbalized an understanding. Requesting a refill of Norco, says he didn't get it filled at last visit b/c he hasn't needed it, now with his leg pain he's been using it.

## 2021-10-11 ENCOUNTER — OFFICE VISIT (OUTPATIENT)
Dept: ORTHOPEDIC SURGERY | Facility: CLINIC | Age: 76
End: 2021-10-11

## 2021-10-11 VITALS
BODY MASS INDEX: 32.08 KG/M2 | SYSTOLIC BLOOD PRESSURE: 148 MMHG | DIASTOLIC BLOOD PRESSURE: 110 MMHG | HEIGHT: 74 IN | HEART RATE: 70 BPM | WEIGHT: 250 LBS

## 2021-10-11 DIAGNOSIS — S82.832A OTHER CLOSED FRACTURE OF PROXIMAL END OF LEFT FIBULA, INITIAL ENCOUNTER: ICD-10-CM

## 2021-10-11 DIAGNOSIS — M17.12 PRIMARY OSTEOARTHRITIS OF LEFT KNEE: ICD-10-CM
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
## 2021-10-11 DIAGNOSIS — M11.262 CHONDROCALCINOSIS OF LEFT KNEE: ICD-10-CM

## 2021-10-11 DIAGNOSIS — M79.662 PAIN OF LEFT LOWER LEG: Primary | ICD-10-CM

## 2021-10-11 DIAGNOSIS — Z72.0 TOBACCO ABUSE: ICD-10-CM

## 2021-10-11 PROCEDURE — 99204 OFFICE O/P NEW MOD 45 MIN: CPT | Performed by: PHYSICIAN ASSISTANT

## 2021-10-11 NOTE — PROGRESS NOTES
[facility]    Subjective     Chief Complaint   Patient presents with   • Left Knee - Pain     DOI: Approximately 9/29/21        HPI  Papito Salinas is a 76 y.o. male.  New patient presents for evaluation of left lateral knee pain.  Symptoms/pain have been ongoing 9/29/2021.  ISRRAEL: History of a fall.  Patient contacted his PCP who ordered imaging which showed a proximal fibula fracture.  He was referred to orthopedics for further evaluation and treatment.  Currently using crutches to assist with ambulation.    Pain scale: 6/10.  Severity of the pain moderate.  Quality of the pain aching.  Associated symptoms swelling.  Activity related to pain walking, standing, rising from a seated position.  Pain relieved by rest.  No reported numbness or tingling.  Prior treatments currently using crutches as well as taking Norco which was previously described for low back issues.    No reported mechanical symptoms, such as locking or catching.    Denies fever, chills, night sweats or other constitutional symptoms.  Positive tobacco use.  BMI 32.08.      Past Medical History:   Diagnosis Date   • GERD (gastroesophageal reflux disease)    • Hypertension    • Insomnia    • JEAN (obstructive sleep apnea)    • Plantar fascial fibromatosis       Past Surgical History:   Procedure Laterality Date   • VEIN SURGERY        Family History   Problem Relation Age of Onset   • Cancer Mother    • Cancer Father      Social History     Socioeconomic History   • Marital status:    Tobacco Use   • Smoking status: Current Every Day Smoker     Packs/day: 0.50     Years: 50.00     Pack years: 25.00     Types: Cigarettes   • Smokeless tobacco: Former User     Types: Chew   Substance and Sexual Activity   • Alcohol use: Yes     Comment: occasional   • Drug use: No      Current Outpatient Medications on File Prior to Visit   Medication Sig Dispense Refill   • allopurinol (ZYLOPRIM) 300 MG tablet Take 1 tablet by  "mouth Daily. 90 tablet 3   • amitriptyline (ELAVIL) 10 MG tablet Take 1 tablet by mouth Every Night. 90 tablet 3   • doxazosin (CARDURA) 4 MG tablet Take 1 tablet by mouth every night at bedtime. 90 tablet 3   • HYDROcodone-acetaminophen (Norco) 7.5-325 MG per tablet Take 1 tablet by mouth Every 6 (Six) Hours As Needed for Moderate Pain . 90 tablet 0   • LIALDA 1.2 G EC tablet Take 1 tablet by mouth 2 (Two) Times a Day.  3   • metoprolol succinate XL (TOPROL-XL) 100 MG 24 hr tablet TAKE 1 TABLET BY MOUTH EVERY DAY 90 tablet 3   • omeprazole (priLOSEC) 40 MG capsule Take 1 capsule by mouth Daily. 90 capsule 3   • PREVNAR 13 vaccine TO BE ADMINISTERED BY PHARMACIST FOR IMMUNIZATION  0   • temazepam (RESTORIL) 15 MG capsule Take 1 capsule by mouth At Night As Needed for Sleep. 30 capsule 5   • valsartan (DIOVAN) 160 MG tablet Take 1 tablet by mouth Daily. 90 tablet 3     No current facility-administered medications on file prior to visit.      Allergies   Allergen Reactions   • Nsaids GI Bleeding     colitis   • Penicillins Other (See Comments)     Fever spiked and out for 7days        The following portions of the patient's history were reviewed and updated as appropriate: allergies, current medications, past family history, past medical history, past social history, past surgical history and problem list.    Review of Systems   Constitutional: Negative.    HENT: Negative.    Eyes: Negative.    Respiratory: Negative.    Cardiovascular: Negative.    Gastrointestinal: Negative.    Endocrine: Negative.    Genitourinary: Negative.    Musculoskeletal: Positive for arthralgias.   Skin: Negative.    Allergic/Immunologic: Negative.    Neurological: Negative.    Hematological: Negative.    Psychiatric/Behavioral: Negative.         Objective      Physical Exam  BP (!) 148/110   Pulse 70   Ht 188 cm (74.02\")   Wt 113 kg (250 lb)   BMI 32.08 kg/m²     Body mass index is 32.08 kg/m².    GENERAL APPEARANCE: awake, alert & " oriented x 3, in no acute distress and well developed, well nourished  PSYCH: normal mood and affect  LUNGS:  breathing nonlabored, no wheezing  EYES: sclera anicteric, pupils equal  CARDIOVASCULAR: palpable pulses. Capillary refill less than 2 seconds  INTEGUMENTARY: skin intact, no clubbing, cyanosis  NEUROLOGIC:  Normal Sensation         Ortho Exam  Integument:   Right knee: No skin lesions, no rash, no ecchymosis    Neurologic:   Sensation:    Right foot: Intact to light touch on the dorsal and plantar aspect   Motor:    Right lower extremity: 5/5 quadriceps, hamstrings, ankle dorsiflexors, and ankle plantar flexors    Vascular:   Right lower extremity: 2+ dorsalis pedis pulse, prompt capillary refill    Lower Extremities:   Right Knee:    Tenderness:  Tenderness lateral aspect of the knee especially over fibular head none proximal tib-fib joint.    Effusion:  Mild    Swellin+    Crepitus:  Positive    Atrophy:  None    Range of motion:  Extension: 0°       Flexion: 110°, limited secondary to pain    Instability:  Stress test not performed to the knee.    Deformities:  Genu varum      Imaging/Studies  Dr. Wilkins and I reviewed plain film imaging performed on 10/7/2021.    EXAMINATION: XR KNEE 1 OR 2 VW LEFT-      INDICATION: JOINT PAIN, KNEE      COMPARISON: NONE     FINDINGS: There is cortical step-off noted involving the medial aspect  of the proximal fibula concerning for acute fracture. Cortical margins  are otherwise intact. A moderate to large joint effusion is present.  Advanced arthrosis changes are present with chondrocalcinosis, narrowing  and sclerosis.     IMPRESSION:  Acute minimally displaced fracture of the proximal fibula  with moderate to large joint effusion.     Advanced tricompartmental arthrosis changes and chondrocalcinosis.     Findings discussed with Dr. Garcia's nurse by Boaz Mabry via  phone at 1:01 PM      This report was finalized on 10/8/2021 1:01 PM by Boaz  Raghavendra.  Assessment/Plan        ICD-10-CM ICD-9-CM   1. Pain of left lower leg  M79.662 729.5   2. Other closed fracture of proximal end of left fibula, initial encounter  S82.832A 823.01   3. Primary osteoarthritis of left knee  M17.12 715.16   4. Chondrocalcinosis of left knee  M11.262 275.49     712.36   5. Fall from slip, trip, or stumble, initial encounter  W01.0XXA E885.9   6. Tobacco abuse  Z72.0 305.1       Orders Placed This Encounter   Procedures   • Ambulatory Referral to Physical Therapy Evaluate and treat, Ortho        -Left lower leg pain at the region of the proximal tib-fib joint due to a proximal fibula fracture secondary to a fall--patient was placed into a T ROM brace for stability and support.  Brace was left open.  -Patient referred to formal PT (MERLYN Montgomery).  -Primary osteoarthritis left knee, chondrocalcinosis--we will continue with observation for now.  In the future he may require injection therapy if the arthritic symptoms flare.  -Patient already takes Norco 7.5 for low back pain.  If he needs a refill of this medication left go through his PCP.  If he does not need the narcotics recommend over-the-counter Tylenol/acetaminophen.  -Tobacco use--discussed the importance of smoking cessation, reviewed the adverse effects of tobacco use: increased risk of tendonitis (more difficult to treat and resolve), hardening of the arteries, gangrene, amputation, etc. Included in the counseling were treatments options for cessation: quitting cold turkey, medication, nicotine step-down programs and smoking cessation programs. Furthermore, I explained to the patient the importance of abstaining from nicotine usage as nicotine is known to increase risk of complications associated with surgery including but not limited to infection, decreased wound healing, fracture/fusion nonunion, slowed soft tissue healing, and increased surgery associated pain. (5 minutes spent counseling patient)  -Follow-up 4  weeks for repeat evaluation to include preclinic imaging of his left knee (AP and lateral).  -Questions and concerns answered.    History, exam and imaging all discussed with Dr. Wilkins who agrees with the above assessment and plan.      Medical Decision Making  Management Options : over-the-counter medicine, prescription/IM medicine and physical/occupational therapy  Data/Risk: radiology tests       Raina Liang PA-C  10/15/21  10:05 EDT               EMR Dragon/Transcription disclaimer:  Much of this encounter note is an electronic transcription of spoken language to printed text. Electronic transcription of spoken language may permit erroneous, or at times, nonsensical words or phrases to be inadvertently transcribed. Although I have reviewed the note for such errors, some may still exist.

## 2021-10-12 ENCOUNTER — TELEPHONE (OUTPATIENT)
Dept: FAMILY MEDICINE CLINIC | Facility: CLINIC | Age: 76
End: 2021-10-12

## 2021-10-12 NOTE — TELEPHONE ENCOUNTER
Caller: Papito Salinas    Relationship: Self    Best call back number: [phone]    What medications are you currently taking:   Current Outpatient Medications on File Prior to Visit   Medication Sig Dispense Refill   • allopurinol (ZYLOPRIM) 300 MG tablet Take 1 tablet by mouth Daily. 90 tablet 3   • amitriptyline (ELAVIL) 10 MG tablet Take 1 tablet by mouth Every Night. 90 tablet 3   • doxazosin (CARDURA) 4 MG tablet Take 1 tablet by mouth every night at bedtime. 90 tablet 3   • HYDROcodone-acetaminophen (Norco) 7.5-325 MG per tablet Take 1 tablet by mouth Every 6 (Six) Hours As Needed for Moderate Pain . 90 tablet 0   • LIALDA 1.2 G EC tablet Take 1 tablet by mouth 2 (Two) Times a Day.  3   • metoprolol succinate XL (TOPROL-XL) 100 MG 24 hr tablet TAKE 1 TABLET BY MOUTH EVERY DAY 90 tablet 3   • omeprazole (priLOSEC) 40 MG capsule Take 1 capsule by mouth Daily. 90 capsule 3   • PREVNAR 13 vaccine TO BE ADMINISTERED BY PHARMACIST FOR IMMUNIZATION  0   • temazepam (RESTORIL) 15 MG capsule Take 1 capsule by mouth At Night As Needed for Sleep. 30 capsule 5   • valsartan (DIOVAN) 160 MG tablet Take 1 tablet by mouth Daily. 90 tablet 3     No current facility-administered medications on file prior to visit.     Which medication are you concerned about: HYDROcodone-acetaminophen (Norco) 7.5-325 MG per tablet    Who prescribed you this medication: ROSA M MACIEL     What are your concerns: PATIENT STATED THAT HE HURT HIS KNEE AND NOW NEEDS REFILLS

## 2021-11-08 ENCOUNTER — OFFICE VISIT (OUTPATIENT)
Dept: ORTHOPEDIC SURGERY | Facility: CLINIC | Age: 76
End: 2021-11-08

## 2021-11-08 VITALS — OXYGEN SATURATION: 98 % | HEART RATE: 69 BPM | BODY MASS INDEX: 31.97 KG/M2 | WEIGHT: 249.12 LBS | HEIGHT: 74 IN

## 2021-11-08 DIAGNOSIS — S82.832D OTHER CLOSED FRACTURE OF PROXIMAL END OF LEFT FIBULA WITH ROUTINE HEALING, SUBSEQUENT ENCOUNTER: Primary | ICD-10-CM

## 2021-11-08 PROCEDURE — 99213 OFFICE O/P EST LOW 20 MIN: CPT | Performed by: PHYSICIAN ASSISTANT

## 2021-11-08 NOTE — PROGRESS NOTES
"    [facility] [place] [facility] Note        Subjective     CC: Follow-up (4 week follow up; Other closed fracture of proximal end of left fibula (DOI: Approximately 9/29/21))      JORGE LUIS Salinas is a 76 y.o. male.  Patient returns today for follow-up evaluation of his left proximal fibula fracture.  He has been wearing his hinged TROM brace, fully open.  He has crutches that he is been using to help assist with ambulation.  He did not go to PT as directed after his next visit.  He has been doing exercises on his own.    Pain scale 2-3/10.  Pain is more localized to the knee area and not the proximal fibula.  Still has difficulty with climbing stairs.  No reported numbness or tingling.    Overall, patient's symptoms are improved.    ROS:    Constiutional:Pt denies fever, chills, nausea, or vomiting.  MSK:as above        Objective      Past Medical History  Past Medical History:   Diagnosis Date   • GERD (gastroesophageal reflux disease)    • Hypertension    • Insomnia    • JEAN (obstructive sleep apnea)    • Plantar fascial fibromatosis          Physical Exam  Pulse 69   Ht 188 cm (74.02\")   Wt 113 kg (249 lb 1.9 oz)   SpO2 98%   BMI 31.97 kg/m²     Body mass index is 31.97 kg/m².    Patient is well nourished and well developed.        Ortho Exam  Right knee/proximal fibula  Alignment: Genu varum  Skin: Grossly intact without any redness or warmth.  Continues to have trace effusion noted to the knee.  Once again soft tissue swelling noted into the lower leg.  Tenderness: Minimal tenderness noted proximal fibula.  More tenderness noted to the knee joint itself.  Motion: 0-115 degrees.  Motor/sensory: Grossly intact L2-S1.      Imaging/Labs/EMG Reviewed:  Ordered left knee plain films.  Imaging read/interpreted by Dr. Wilkins.    Knee X-Ray  Indication: Pain     AP and lateral views of left knee     Findings:  Healing proximal fibular fracture  No change in chondrocalcinosis and arthritis     prior " studies were available for comparison.      Assessment:  1. Other closed fracture of proximal end of left fibula with routine healing, subsequent encounter        Plan:  1. Healing proximal left fibula fracture.  2. Patient will be taken out of TROM brace and placed in a hinged knee brace.  He states he already has one at home and does not need one from us.  3. Patient continues to use Norco as needed but for his low back pain.  He does not need much pain control for his knee.  4. Patient has crutches but he states he feels unbalanced with these.  At this time recommend use of walker or cane to help assist with his sensation of unbalance or generalized weakness to the lower extremities.  5. Follow-up in 4 weeks with repeat imaging of the left knee, full series to fully assess severity of arthritis.  6. Questions and concerns answered.      Raina Liang PA-C  11/09/21  12:11 MARILOU Garcia disclaimer:  Much of this encounter note is an electronic transcription/translation of spoken language to printed text. The electronic translation of spoken language may permit erroneous, or at times, nonsensical words or phrases to be inadvertently transcribed; Although I have reviewed the note for such errors, some may still exist.

## 2021-11-18 ENCOUNTER — TELEPHONE (OUTPATIENT)
Dept: ORTHOPEDIC SURGERY | Facility: CLINIC | Age: 76
End: 2021-11-18

## 2021-11-18 NOTE — TELEPHONE ENCOUNTER
I called patient and explained what Raina said and he understood. He already has a PT appointment scheduled for 2 weeks from now. He will call back if he has any further questions or concerns.  Janie

## 2021-11-18 NOTE — TELEPHONE ENCOUNTER
Provider: JUAN DICKINSON    Caller: CINDY LE    Relationship to Patient: SELF    Phone Number: [phone]    Reason for Call: PT SAID THAT HIS KNEE HAD BEEN DOING BETTER BUT SUDDENLY IS STARTING TO HURT AGAIN ON TUESDAY. IT FEELS STIFF AND SORE, HAVING TROUBLE WITH BENDING IT AND IT HURTS TO DO SO IN THE BACK OF HIS KNEE. PT WOULD LIKE A CALL BACK, WANTS TO KNOW IF THIS IS A NORMAL THING TO HAVE HAPPEN.

## 2021-11-18 NOTE — TELEPHONE ENCOUNTER
Raina's response:       Yes this can be a normal thing.  He does have arthritis in the knee as well as the history of the fibula fracture.  Is he wearing the hinged knee brace?  I can also refer him to physical therapy which I believe would help.  I placed a physical therapy appointment when I initially saw him but he did not attend.  I can put a new one in if he would like.

## 2021-11-30 ENCOUNTER — TREATMENT (OUTPATIENT)
Dept: PHYSICAL THERAPY | Facility: CLINIC | Age: 76
End: 2021-11-30

## 2021-11-30 DIAGNOSIS — S82.832A CLOSED FRACTURE OF PROXIMAL END OF LEFT FIBULA, UNSPECIFIED FRACTURE MORPHOLOGY, INITIAL ENCOUNTER: Primary | ICD-10-CM

## 2021-11-30 PROCEDURE — 97110 THERAPEUTIC EXERCISES: CPT | Performed by: PHYSICAL THERAPIST

## 2021-11-30 PROCEDURE — 97161 PT EVAL LOW COMPLEX 20 MIN: CPT | Performed by: PHYSICAL THERAPIST

## 2021-11-30 NOTE — PROGRESS NOTES
Physical Therapy Initial Evaluation and Plan of Care        Patient: Papito Salinas   : 1945  Diagnosis/ICD-10 Code:  Closed fracture of proximal end of left fibula, unspecified fracture morphology, initial encounter [S82.832A]  Referring practitioner: Raina Liang, *  Date of Initial Visit: 2021  Today's Date: 2021  Patient seen for 1 sessions           Subjective Questionnaire: LEFS: 32/80      Subjective Evaluation    History of Present Illness  Date of onset: 2021  Mechanism of injury: Patient slid off of last step inside of home and landed on his L knee, fracturing the proximal aspect of his L fibula. Patient waited a month before seeing MD. He was given a knee extension brace and 2 axillary crutches. Recent imaging revealed appropriate healing. He returns to see his MD on .    CLOF: axillary crutch in R UE for community amb and walker or no AD inside home, stands and sits for showering, step to pattern on stairs    PLOF: used shower chair intermittently, low back would limit walking, played golf, mowed lawn with riding and push mower, reciprocal pattern on stairs    Medical history: two additional falls this year from dragging his feet though he doesn't perceive decline in balance beside current injury, hypertension controlled, low back pain with prolonged standing/walking, L LE edema chronic (worse since current injury)    Pain  Current pain ratin  At best pain ratin  At worst pain ratin  Exacerbated by: cold weather, prolonged walking.  Progression: improved    Social Support  Lives in: multiple-level home    Patient Goals  Patient goals for therapy: decreased pain, improved balance, return to sport/leisure activities and independence with ADLs/IADLs             Objective          Palpation   Left   Hypertonic in the distal semimembranosus and distal semitendinosus.   Tenderness of the distal semimembranosus and distal semitendinosus.     Active Range of  Motion   Left Knee   Flexion: 120 degrees with pain  Extensor lag: 10 degrees     Right Knee   Flexion: 126 degrees   Extensor la degrees   Left Ankle/Foot   Dorsiflexion (ke): WFL  Plantar flexion: WFL  Inversion: WFL  Eversion: WFL    Right Ankle/Foot   Dorsiflexion (ke): WFL  Plantar flexion: WFL  Inversion: WFL  Eversion: WFL    Additional Active Range of Motion Details  SLR at -15 deg ext on L    Passive Range of Motion   Left Knee   Flexion: 122 degrees with pain    Patellar Mobility   Left Knee Patellar tendons within functional limits include the medial. Hypomobile in the left lateral, left superior and left inferior patellar tendon(s).     Right Knee Patellar tendons within functional limits include the medial. Hypomobile in the lateral, superior and inferior patellar tendon(s).     Joint Play   Left Ankle/Foot  Joints within functional limits are the distal tibiofibular joint, talocrural joint and subtalar joint.     Right Ankle/Foot  Joints within functional limits are the fibular head, distal tibiofibular joint, talocrural joint and subtalar joint.     Strength/Myotome Testing     Left Hip   Planes of Motion   Flexion: 4+  Extension: 3+  External rotation: 4-    Right Hip   Planes of Motion   Flexion: 5  Extension: 4-  External rotation: 4+    Left Knee   Flexion: 4- (pain)  Extension: 4- (pain)  Quadriceps contraction: fair    Right Knee   Flexion: 5  Extension: 5  Quadriceps contraction: good    Left Ankle/Foot   Dorsiflexion: 5  Inversion: 5  Eversion: 5    Right Ankle/Foot   Dorsiflexion: 5  Inversion: 5  Eversion: 5    Ambulation     Observational Gait     Additional Observational Gait Details  Axillary crutch in RUE, brace on L knee, decreased B step length    Functional Assessment     Single Leg Stance   Left: 0 seconds  Right: 0 seconds    Comments  5x sit to stand: 20 sec with significant B UE support          Assessment & Plan     Assessment  Impairments: abnormal gait, abnormal muscle  firing, abnormal or restricted ROM, activity intolerance, impaired balance, impaired physical strength, lacks appropriate home exercise program, pain with function and safety issue  Functional Limitations: carrying objects, walking, uncomfortable because of pain, standing and stooping  Assessment details: Patient presents with impaired functional mobility s/p fall and L fibular fracture 3 months ago. L knee demonstrates greatest impairment, with quad weakness, decreased ROM, and joint pain secondary to quad weakness. Patient does not demonstrate the severity of his balance impairment.    Barriers to therapy: lack of patient awareness regarding safety risk, age  Prognosis: good    Goals  Plan Goals: Short term goals (4weeks)  1. Patient to be compliant with initial HEP.  2. Patient to demonstrate symmetrical L knee AROM.  3. Patient to demonstrate good L quad contraction.  4. Patient to ambulate community distances without AD.    Long Term goals (8 weeks)  1. Patient to demonstrate SLR on L without quad lag.  3. Patient to ascend and descend eight 8 inch steps reciprocally with one handrail with minimal to no antalgia or difficulty.  4. Patient to demonstrate independence with home exercise program.  5. Patient to improve LEFS to greater than or equal to 50/80.  6. Patient to demonstrate single leg stance for >10 sec bilaterally      Plan  Therapy options: will be seen for skilled therapy services  Planned modality interventions: thermotherapy (hydrocollator packs), TENS, cryotherapy and dry needling  Planned therapy interventions: manual therapy, neuromuscular re-education, soft tissue mobilization, strengthening, stretching, therapeutic activities, joint mobilization, home exercise program, functional ROM exercises and balance/weight-bearing training  Frequency: 2x week  Duration in weeks: 8  Treatment plan discussed with: patient  Plan details: 2x/week for 8 weeks        Timed:  Manual Therapy:    0     mins   78890;  Therapeutic Exercise:    10     mins  72095;     Neuromuscular Ean:    0    mins  17130;    Therapeutic Activity:     0     mins  24159;     Gait Trainin     mins  90100;     Ultrasound:     0     mins  88577;    Electrical Stimulation:    0     mins  84874 ( );    Untimed:  Electrical Stimulation:    0     mins  46889 ( );  Mechanical Traction:    0     mins  53213;     Timed Treatment:   10   mins   Total Treatment:     45   mins    PT SIGNATURE: Phani De Guzman, EVELIO   DATE TREATMENT INITIATED: 2021    Initial Certification  Certification Period: 2022  I certify that the therapy services are furnished while this patient is under my care.  The services outlined above are required by this patient, and will be reviewed every 90 days.     PHYSICIAN: Raina Liang PA-C      DATE:     Please sign and return via fax to [phone].. Thank you, [facility].

## 2021-12-06 ENCOUNTER — OFFICE VISIT (OUTPATIENT)
Dept: ORTHOPEDIC SURGERY | Facility: CLINIC | Age: 76
End: 2021-12-06

## 2021-12-06 VITALS
BODY MASS INDEX: 31.97 KG/M2 | HEIGHT: 74 IN | DIASTOLIC BLOOD PRESSURE: 82 MMHG | SYSTOLIC BLOOD PRESSURE: 142 MMHG | WEIGHT: 249.12 LBS

## 2021-12-06 DIAGNOSIS — S82.832D OTHER CLOSED FRACTURE OF PROXIMAL END OF LEFT FIBULA WITH ROUTINE HEALING, SUBSEQUENT ENCOUNTER: Primary | ICD-10-CM

## 2021-12-06 PROCEDURE — 99213 OFFICE O/P EST LOW 20 MIN: CPT | Performed by: PHYSICIAN ASSISTANT

## 2021-12-06 NOTE — PROGRESS NOTES
"    [facility] Note        Subjective     CC: Follow-up (4 weeks follow up Other closed fracture of proximal end of left fibula (DOI: Approximately 9/29/21)      HPI    Papito Salinas is a 76 y.o. male. Patient returns today for follow-up evaluation proximal left fibula fracture. He is due to start physical therapy tomorrow. He still notes weakness to the knees as well as some pain but he does have chondrocalcinosis as well as arthritis to that knee.    Pain scale 3/10. He still notes some swelling as well as giving away to the knee. Pain typically worse with walking, standing, stair climbing. Resting, sitting and use of cane/walker do help with pain control.    Overall, patient's symptoms are improved.    ROS:    Constiutional:Pt denies fever, chills, nausea, or vomiting.  MSK:as above        Objective      Past Medical History  Past Medical History:   Diagnosis Date   • GERD (gastroesophageal reflux disease)    • Hypertension    • Insomnia    • JEAN (obstructive sleep apnea)    • Plantar fascial fibromatosis          Physical Exam  /82   Ht 188 cm (74.02\")   Wt 113 kg (249 lb 1.9 oz)   BMI 31.97 kg/m²     Body mass index is 31.97 kg/m².    Patient is well nourished and well developed.        Ortho Exam  Right knee/proximal fibula  Alignment: Genu varum  Skin: Grossly intact without any redness or warmth.   Trace effusion.   Tenderness: No specific tenderness noted to the proximal fibula. He does have some tenderness noted to the knee joint itself both medial and lateral joint lines.  Motion: 0-115 degrees.  Motor/sensory: Grossly intact L2-S1.      Imaging/Labs/EMG Reviewed:  Ordered left knee plain films.  Imaging read/interpreted by Dr. Wilkins.    Knee X-Ray  Indication: Pain     Upright AP of bilateral knees. Lateral, skiers and Sunrise views of left knee     Findings:  Healed proximal fibula fracture  No change in chondrocalcinosis and arthritis     prior studies were available " for comparison.      Assessment:  1. Other closed fracture of proximal end of left fibula with routine healing, subsequent encounter        Plan:  1. Healed left proximal fibula fracture.  2. Patient is due to start formal physical therapy tomorrow. Continue with this appointment.  3. Recommend over-the-counter pain medication as needed.  4. Follow-up in 6 weeks for repeat evaluation. Depending on how he is doing at that point may consider possible corticosteroid injection if his knee joint is still is still bothering him due to his underlying arthritis and chondrocalcinosis.  5. Questions and concerns answered.    Case discussed with Dr. Wilkins who agrees with the above assessment and plan.      Raina Liang PA-C  12/07/21  15:36 EST      Dictated Utilizing Dragon Dictation.

## 2021-12-07 ENCOUNTER — TREATMENT (OUTPATIENT)
Dept: PHYSICAL THERAPY | Facility: CLINIC | Age: 76
End: 2021-12-07

## 2021-12-07 DIAGNOSIS — S82.832A CLOSED FRACTURE OF PROXIMAL END OF LEFT FIBULA, UNSPECIFIED FRACTURE MORPHOLOGY, INITIAL ENCOUNTER: Primary | ICD-10-CM

## 2021-12-07 PROCEDURE — 97110 THERAPEUTIC EXERCISES: CPT | Performed by: PHYSICAL THERAPIST

## 2021-12-07 PROCEDURE — 97140 MANUAL THERAPY 1/> REGIONS: CPT | Performed by: PHYSICAL THERAPIST

## 2021-12-07 NOTE — PROGRESS NOTES
Physical Therapy Daily Progress Note        Patient: Papito Salinas   : 1945  Diagnosis/ICD-10 Code:  Closed fracture of proximal end of left fibula, unspecified fracture morphology, initial encounter [S82.832A]  Referring practitioner: Raina Liang, *  Date of Initial Visit: Type: THERAPY  Noted: 2021  Today's Date: 2021  Patient seen for 2 sessions           Patient reports: recent xray revealed good healing of fracture. Reports his leg still feels weak and he favors it while walking.      Objective          Active Range of Motion   Left Knee   Flexion: 116 degrees   Extensor la degrees     Passive Range of Motion   Left Knee   Flexion: 120 degrees with pain      See Exercise, Manual, and Modality Logs for complete treatment.       Assessment/Plan  No crutches or brace today. Pain with TKE reduced with manual tibial ER and quad set. Improved ROM of L knee noted today. Focused on L quad strengthening and knee ROM. Added mini squats, but patient required frequent cues to avoid weight shifting to R.            Timed:  Manual Therapy:    8     mins  14557;  Therapeutic Exercise:    35     mins  17367;     Neuromuscular Ean:   0    mins  79242;    Therapeutic Activity:     0     mins  18256;     Gait Trainin     mins  47822;     Ultrasound:     0     mins  57876;    Electrical Stimulation:    0     mins  10357 ( );    Untimed:  Electrical Stimulation:    0     mins  96781 ( );  Mechanical Traction:    0     mins  62761;     Timed Treatment:   43   mins   Total Treatment:     43   mins    Phani De Guzman, PT  Physical Therapist

## 2021-12-09 ENCOUNTER — TREATMENT (OUTPATIENT)
Dept: PHYSICAL THERAPY | Facility: CLINIC | Age: 76
End: 2021-12-09

## 2021-12-09 DIAGNOSIS — S82.832A CLOSED FRACTURE OF PROXIMAL END OF LEFT FIBULA, UNSPECIFIED FRACTURE MORPHOLOGY, INITIAL ENCOUNTER: Primary | ICD-10-CM

## 2021-12-09 PROCEDURE — 97110 THERAPEUTIC EXERCISES: CPT | Performed by: PHYSICAL THERAPIST

## 2021-12-09 NOTE — PROGRESS NOTES
Physical Therapy Daily Progress Note        Patient: Papito Salinas   : 1945  Diagnosis/ICD-10 Code:  Closed fracture of proximal end of left fibula, unspecified fracture morphology, initial encounter [S82.832A]  Referring practitioner: Raina Liang, *  Date of Initial Visit: Type: THERAPY  Noted: 2021  Today's Date: 2021  Patient seen for 3 sessions           Patient reports: having trouble straightening his L knee since last visit. Has had pain in the front and back of the knee. Has not applied ice.      Objective   See Exercise, Manual, and Modality Logs for complete treatment.       Assessment/Plan    Held full weight bearing squats. Less pain reported after AROM LAQs. Patient advised to perform LAQs and apply ice to his L knee until he returns.    7 min unbilled      Timed:  Manual Therapy:    0     mins  64557;  Therapeutic Exercise:    38     mins  83862;     Neuromuscular Ean:   0    mins  01980;    Therapeutic Activity:     0     mins  69696;     Gait Trainin     mins  53790;     Ultrasound:     0     mins  94393;    Electrical Stimulation:    0     mins  20622 ( );    Untimed:  Electrical Stimulation:    0     mins  53852 ( );  Mechanical Traction:    0     mins  56040;     Timed Treatment:   38   mins   Total Treatment:     45   mins    Phani De Guzman PT  Physical Therapist

## 2021-12-14 ENCOUNTER — TREATMENT (OUTPATIENT)
Dept: PHYSICAL THERAPY | Facility: CLINIC | Age: 76
End: 2021-12-14

## 2021-12-14 PROCEDURE — 97110 THERAPEUTIC EXERCISES: CPT | Performed by: PHYSICAL THERAPIST

## 2021-12-14 PROCEDURE — 97530 THERAPEUTIC ACTIVITIES: CPT | Performed by: PHYSICAL THERAPIST

## 2021-12-14 PROCEDURE — 97116 GAIT TRAINING THERAPY: CPT | Performed by: PHYSICAL THERAPIST

## 2021-12-14 PROCEDURE — 97140 MANUAL THERAPY 1/> REGIONS: CPT | Performed by: PHYSICAL THERAPIST

## 2021-12-14 NOTE — PROGRESS NOTES
Physical Therapy Daily Progress Note    Patient: Papito Salinas   : 1945  Diagnosis/ICD-10 Code:  No primary diagnosis found.  Referring practitioner: Raina Liagn, *  Date of Initial Visit: Type: THERAPY  Noted: 2021  Today's Date: 2021  Patient seen for 4 sessions         Papito Salinas reports feeling more soreness and stiffness in his knee since starting a new home exercise program over the weekend.      Subjective     Objective   See Exercise, Manual, and Modality Logs for complete treatment.       Assessment/Plan  Mr. Salinas presents with stiffness into ext (lacking 8 deg) and flexion (95 deg).  Focused visit on improving TKE mobility,dynamic control into flx<=>ext and general loading tolerance of the knee. Also, gait training focused on improving TKE mobility at terminal stance with push off.    *Updated HEP with written and verbal instruction (included in total time billed as TE below).       Manual Therapy:    19     mins  12689;  Therapeutic Exercise:    15     mins  61402;     Neuromuscular Ean:        mins  40783;    Therapeutic Activity:     10     mins  52121;     Gait Trainin     mins  80035;     Ultrasound:          mins  83697;    Electrical Stimulation:         mins  09828 ( );  Dry Needling         mins self-pay    Timed Treatment:   53   mins   Total Treatment:     53   mins    Sumanth Cordoba, PT  Physical Therapist

## 2021-12-16 ENCOUNTER — TREATMENT (OUTPATIENT)
Dept: PHYSICAL THERAPY | Facility: CLINIC | Age: 76
End: 2021-12-16

## 2021-12-16 DIAGNOSIS — S82.832A CLOSED FRACTURE OF PROXIMAL END OF LEFT FIBULA, UNSPECIFIED FRACTURE MORPHOLOGY, INITIAL ENCOUNTER: Primary | ICD-10-CM

## 2021-12-16 PROCEDURE — 97530 THERAPEUTIC ACTIVITIES: CPT | Performed by: PHYSICAL THERAPIST

## 2021-12-16 PROCEDURE — 97110 THERAPEUTIC EXERCISES: CPT | Performed by: PHYSICAL THERAPIST

## 2021-12-16 PROCEDURE — 97140 MANUAL THERAPY 1/> REGIONS: CPT | Performed by: PHYSICAL THERAPIST

## 2021-12-16 NOTE — PROGRESS NOTES
Physical Therapy Daily Progress Note    Patient: Ppaito Salinas   : 1945  Diagnosis/ICD-10 Code:  Closed fracture of proximal end of left fibula, unspecified fracture morphology, initial encounter [S82.832A]  Referring practitioner: Raina Liang, *  Date of Initial Visit: Type: THERAPY  Noted: 2021  Today's Date: 2021  Patient seen for 5 sessions         Papito Salinas reports knee is more sore today since last visit.      Subjective     Objective   See Exercise, Manual, and Modality Logs for complete treatment.       Assessment/Plan  Continued emphasis on improving TKE mobility>TKF mobility.  Instructed client to start using Jose hoes to improve L LE edema.      *Updated HEP with written and verbal instruction (included in total time billed as TE below).           Manual Therapy:    19     mins  32034;  Therapeutic Exercise:    16     mins  10123;     Neuromuscular Ean:        mins  39867;    Therapeutic Activity:     10     mins  55487;     Gait Training:           mins  32320;     Ultrasound:          mins  09350;    Electrical Stimulation:         mins  97251 ( );  Dry Needling          mins self-pay    Timed Treatment:   45   mins   Total Treatment:     45   mins    Sumanth Cordoba, PT  Physical Therapist

## 2021-12-20 ENCOUNTER — TREATMENT (OUTPATIENT)
Dept: PHYSICAL THERAPY | Facility: CLINIC | Age: 76
End: 2021-12-20

## 2021-12-20 DIAGNOSIS — S82.832A CLOSED FRACTURE OF PROXIMAL END OF LEFT FIBULA, UNSPECIFIED FRACTURE MORPHOLOGY, INITIAL ENCOUNTER: Primary | ICD-10-CM

## 2021-12-20 PROCEDURE — 97110 THERAPEUTIC EXERCISES: CPT | Performed by: PHYSICAL THERAPIST

## 2021-12-20 NOTE — PROGRESS NOTES
Physical Therapy Daily Progress Note        Patient: Papito Salinas   : 1945  Diagnosis/ICD-10 Code:  Closed fracture of proximal end of left fibula, unspecified fracture morphology, initial encounter [S82.832A]  Referring practitioner: Raina Liang, *  Date of Initial Visit: Type: THERAPY  Noted: 2021  Today's Date: 2021  Patient seen for 6 sessions           Patient reports: doing his HEP 2 days ago and couldn't walk yesterday. He took anti-inflammatories that morning and has felt better ever since.      Objective          Active Range of Motion   Left Knee   Flexion: 118 degrees   Extensor la degrees       See Exercise, Manual, and Modality Logs for complete treatment.       Assessment/Plan  Continued focus on gaining L TKE, though his ultimate potential to achieve neutral is unknown as patient reports his L knee lacked extension prior to recent injury. Continues to report mild lateral anterior knee pain with activities involving active TKE.            Timed:  Manual Therapy:    0     mins  70043;  Therapeutic Exercise:    45     mins  35557;     Neuromuscular Ean:   0    mins  19761;    Therapeutic Activity:     0     mins  07566;     Gait Trainin     mins  69800;     Ultrasound:     0     mins  24701;    Electrical Stimulation:    0     mins  01257 ( );    Untimed:  Electrical Stimulation:    0     mins  54420 ( );  Mechanical Traction:    0     mins  25258;     Timed Treatment:   45   mins   Total Treatment:     45   mins    Phani De Guzman PT  Physical Therapist

## 2021-12-22 ENCOUNTER — TREATMENT (OUTPATIENT)
Dept: PHYSICAL THERAPY | Facility: CLINIC | Age: 76
End: 2021-12-22

## 2021-12-22 DIAGNOSIS — S82.832A CLOSED FRACTURE OF PROXIMAL END OF LEFT FIBULA, UNSPECIFIED FRACTURE MORPHOLOGY, INITIAL ENCOUNTER: Primary | ICD-10-CM

## 2021-12-22 PROCEDURE — 97110 THERAPEUTIC EXERCISES: CPT | Performed by: PHYSICAL THERAPIST

## 2021-12-22 PROCEDURE — 97112 NEUROMUSCULAR REEDUCATION: CPT | Performed by: PHYSICAL THERAPIST

## 2021-12-22 PROCEDURE — 97140 MANUAL THERAPY 1/> REGIONS: CPT | Performed by: PHYSICAL THERAPIST

## 2021-12-22 NOTE — PROGRESS NOTES
Physical Therapy Daily Progress Note        Patient: Papito Salinas   : 1945  Diagnosis/ICD-10 Code:  Closed fracture of proximal end of left fibula, unspecified fracture morphology, initial encounter [S82.832A]  Referring practitioner: Raina Liang, *  Date of Initial Visit: Type: THERAPY  Noted: 2021  Today's Date: 2021  Patient seen for 7 sessions           Patient reports: his knee still feels good but he is still on steroids.      Objective   See Exercise, Manual, and Modality Logs for complete treatment.       Assessment/Plan  Improved L knee AROM/PROM extension following fibular head mobs. Added gait training to improve push off on L. He still demonstrates compensation of increase L hip abd/IR and L trunk rotation at initial contact and weight acceptance but this may be due to history of L femur fracture.            Timed:  Manual Therapy:    15     mins  29475;  Therapeutic Exercise:    15     mins  07279;     Neuromuscular Ean:   10    mins  99739;    Therapeutic Activity:     0     mins  13714;     Gait Trainin     mins  05467;     Ultrasound:     0     mins  23322;    Electrical Stimulation:    0     mins  89512 ( );    Untimed:  Electrical Stimulation:    0     mins  45591 ( );  Mechanical Traction:    0     mins  14293;     Timed Treatment:   40   mins   Total Treatment:     40   mins    Phani De Guzman PT  Physical Therapist

## 2021-12-27 ENCOUNTER — TREATMENT (OUTPATIENT)
Dept: PHYSICAL THERAPY | Facility: CLINIC | Age: 76
End: 2021-12-27

## 2021-12-27 DIAGNOSIS — S82.832A CLOSED FRACTURE OF PROXIMAL END OF LEFT FIBULA, UNSPECIFIED FRACTURE MORPHOLOGY, INITIAL ENCOUNTER: Primary | ICD-10-CM

## 2021-12-27 PROCEDURE — 97110 THERAPEUTIC EXERCISES: CPT | Performed by: PHYSICAL THERAPIST

## 2021-12-27 NOTE — PROGRESS NOTES
Physical Therapy Daily Progress Note        Patient: Papito Salinas   : 1945  Diagnosis/ICD-10 Code:  Closed fracture of proximal end of left fibula, unspecified fracture morphology, initial encounter [S82.832A]  Referring practitioner: Raina Liang, *  Date of Initial Visit: Type: THERAPY  Noted: 2021  Today's Date: 2021  Patient seen for 8 sessions           Patient reports: his knee has been feeling pretty good in recent days.      Objective   See Exercise, Manual, and Modality Logs for complete treatment.       Assessment/Plan  Able to progress hip and LE closed chain strengthening with minimal knee pain. Resumed prone knee hangs to gain L TKE, but his deficits are more strength-related.            Timed:  Manual Therapy:    0     mins  15760;  Therapeutic Exercise:    45     mins  03542;     Neuromuscular Ean:   0    mins  45609;    Therapeutic Activity:     0     mins  89110;     Gait Trainin     mins  93269;     Ultrasound:     0     mins  67634;    Electrical Stimulation:    0     mins  66896 ( );    Untimed:  Electrical Stimulation:    0     mins  23919 ( );  Mechanical Traction:    0     mins  38132;     Timed Treatment:   45   mins   Total Treatment:     45   mins    Phani De Guzman PT  Physical Therapist

## 2021-12-29 ENCOUNTER — TREATMENT (OUTPATIENT)
Dept: PHYSICAL THERAPY | Facility: CLINIC | Age: 76
End: 2021-12-29

## 2021-12-29 DIAGNOSIS — S82.832A CLOSED FRACTURE OF PROXIMAL END OF LEFT FIBULA, UNSPECIFIED FRACTURE MORPHOLOGY, INITIAL ENCOUNTER: Primary | ICD-10-CM

## 2021-12-29 PROCEDURE — 97110 THERAPEUTIC EXERCISES: CPT | Performed by: PHYSICAL THERAPIST

## 2021-12-29 PROCEDURE — 97530 THERAPEUTIC ACTIVITIES: CPT | Performed by: PHYSICAL THERAPIST

## 2021-12-29 NOTE — PROGRESS NOTES
Re-Assessment / Re-Certification        Patient: Papito Salinas   : 1945  Diagnosis/ICD-10 Code:  Closed fracture of proximal end of left fibula, unspecified fracture morphology, initial encounter [S82.832A]  Referring practitioner: Raina Liang, *  Date of Initial Visit: Type: THERAPY  Noted: 2021  Today's Date: 2021  Patient seen for 9 sessions      Subjective:     Subjective Questionnaire: LEFS: 44/80  Clinical Progress: improved  Home Program Compliance: Yes  Treatment has included: therapeutic exercise    Subjective Evaluation    History of Present Illness  Date of onset: 2021  Mechanism of injury: Patient slid off of last step inside of home and landed on his L knee, fracturing the proximal aspect of his L fibula. Patient waited a month before seeing MD. He was given a knee extension brace and 2 axillary crutches. Recent imaging revealed appropriate healing. He returns to see his MD on .    CLOF: no AD for most ambulation but occasionally uses RW when knee hurts (normally low back limits walking), stands for showering, reciprical pattern on stairs using handrail    PLOF: used shower chair intermittently, low back would limit walking, played golf, mowed lawn with riding and push mower, reciprocal pattern on stairs    Medical history: two additional falls this year from dragging his feet though he doesn't perceive decline in balance beside current injury, hypertension controlled, low back pain with prolonged standing/walking, L LE edema chronic (worse since current injury)    Pain  Current pain ratin  At best pain ratin  At worst pain ratin  Exacerbated by: cold weather, prolonged walking.  Progression: improved    Social Support  Lives in: multiple-level home         Objective          Active Range of Motion   Left Knee   Flexion: 116 degrees with pain  Extensor lag: 10 degrees     Right Knee   Flexion: 126 degrees   Extensor la degrees   Left  Ankle/Foot   Dorsiflexion (ke): WFL  Plantar flexion: WFL  Inversion: WFL  Eversion: WFL    Right Ankle/Foot   Dorsiflexion (ke): WFL  Plantar flexion: WFL  Inversion: WFL  Eversion: WFL    Additional Active Range of Motion Details  SLR at -10 deg ext on L    Passive Range of Motion   Left Knee   Flexion: 123 degrees with pain  Extension: with pain    Additional Passive Range of Motion Details  L knee ext to -6 deg    Patellar Mobility   Left Knee Patellar tendons within functional limits include the medial, lateral and superior. Hypomobile in the left inferior patellar tendon(s).     Right Knee Patellar tendons within functional limits include the medial. Hypomobile in the lateral, superior and inferior patellar tendon(s).     Joint Play   Left Ankle/Foot  Joints within functional limits are the distal tibiofibular joint, talocrural joint and subtalar joint.     Right Ankle/Foot  Joints within functional limits are the fibular head, distal tibiofibular joint, talocrural joint and subtalar joint.     Strength/Myotome Testing     Left Hip   Planes of Motion   Extension: 3+  External rotation: 4    Right Hip   Planes of Motion   Extension: 3+  External rotation: 4+    Left Knee   Flexion: 4- (pain)  Extension: 4- (pain)  Quadriceps contraction: fair    Right Knee   Flexion: 5  Extension: 5  Quadriceps contraction: good    Left Ankle/Foot   Dorsiflexion: 5  Inversion: 5  Eversion: 5    Right Ankle/Foot   Dorsiflexion: 5  Inversion: 5  Eversion: 5    Ambulation     Observational Gait     Additional Observational Gait Details  Decreased L knee extension at initial contact    Functional Assessment     Single Leg Stance   Left: 2 seconds  Right: 2 seconds    Comments  5x sit to stand: 15 sec with significant B UE support      Assessment & Plan     Assessment  Impairments: abnormal gait, abnormal muscle firing, abnormal or restricted ROM, activity intolerance, impaired balance, impaired physical strength, lacks appropriate  home exercise program, pain with function and safety issue  Functional Limitations: carrying objects, walking, uncomfortable because of pain, standing and stooping  Assessment details: Patient presents with impaired functional mobility s/p fall and L fibular fracture 3 months ago. L knee demonstrates greatest impairment, with quad weakness, decreased ROM, and joint pain secondary to quad weakness. Patient does not demonstrate the severity of his balance impairment.    12/29- Patient has received 9 PT visits. He reports 70-80% perceived improvements since beginning PT. Strength and ROM have improved, though both remain limited as he reports impairment prior to his injury. He continues to demonstrate severe balance impairment. Frequency of care is being reduced with greater emphasis on HEP. Treatment will focus on functional strength and balance.     Barriers to therapy: lack of patient awareness regarding safety risk, age  Prognosis: good    Goals  Plan Goals: Short term goals (4weeks)  1. Patient to be compliant with initial HEP. MET  2. Patient to demonstrate symmetrical L knee AROM. NOT MET  3. Patient to demonstrate good L quad contraction. 50% MET  4. Patient to ambulate community distances without AD. MET    Long Term goals (8 weeks)  1. Patient to demonstrate SLR on L without quad lag. MET  3. Patient to ascend and descend eight 8 inch steps reciprocally with one handrail with minimal to no antalgia or difficulty. MET  4. Patient to demonstrate independence with home exercise program. MET  5. Patient to improve LEFS to greater than or equal to 50/80. 74% MET  6. Patient to demonstrate single leg stance for >10 sec bilaterally. NOT MET      Plan  Therapy options: will be seen for skilled therapy services  Planned modality interventions: thermotherapy (hydrocollator packs), TENS, cryotherapy and dry needling  Planned therapy interventions: manual therapy, neuromuscular re-education, soft tissue mobilization,  strengthening, stretching, therapeutic activities, joint mobilization, home exercise program, functional ROM exercises and balance/weight-bearing training  Frequency: 1x week  Duration in weeks: 4  Treatment plan discussed with: patient  Plan details: 1x/week for 4 weeks      Progress toward previous goals: Partially Met    New Goals        Timeframe: 1 month  Prognosis to achieve goals: good    PT Signature: Phani De Guzman PT      Based upon review of the patient's progress and continued therapy plan, it is my medical opinion that Papito Salinas should continue physical therapy treatment at [facility]  [address] [place] [id number] [phone].    Signature: __________________________________  Raina Liang PA-C    Timed:  Manual Therapy:    0     mins  23226;  Therapeutic Exercise:    30     mins  52646;     Neuromuscular Ean:    10    mins  93475;    Therapeutic Activity:     0     mins  59223;     Gait Trainin     mins  08395;     Ultrasound:     0     mins  89492;    Electrical Stimulation:    0     mins  57850 ( );    Untimed:  Electrical Stimulation:    0     mins  89766 ( );  Mechanical Traction:    0     mins  96206;     Timed Treatment:   40   mins   Total Treatment:     40   mins

## 2022-01-05 ENCOUNTER — TREATMENT (OUTPATIENT)
Dept: PHYSICAL THERAPY | Facility: CLINIC | Age: 77
End: 2022-01-05

## 2022-01-05 DIAGNOSIS — S82.832A CLOSED FRACTURE OF PROXIMAL END OF LEFT FIBULA, UNSPECIFIED FRACTURE MORPHOLOGY, INITIAL ENCOUNTER: Primary | ICD-10-CM

## 2022-01-05 PROCEDURE — 97140 MANUAL THERAPY 1/> REGIONS: CPT | Performed by: PHYSICAL THERAPIST

## 2022-01-05 PROCEDURE — 97110 THERAPEUTIC EXERCISES: CPT | Performed by: PHYSICAL THERAPIST

## 2022-01-05 PROCEDURE — 97112 NEUROMUSCULAR REEDUCATION: CPT | Performed by: PHYSICAL THERAPIST

## 2022-01-05 NOTE — PROGRESS NOTES
Physical Therapy Daily Progress Note        Patient: Papito Salinas   : 1945  Diagnosis/ICD-10 Code:  Closed fracture of proximal end of left fibula, unspecified fracture morphology, initial encounter [S82.832A]  Referring practitioner: Raina Liang, *  Date of Initial Visit: Type: THERAPY  Noted: 2021  Today's Date: 2022  Patient seen for 10 sessions           Patient reports: noticing his L LE has been stronger with stairs and standing up from a chair      Objective   See Exercise, Manual, and Modality Logs for complete treatment.       Assessment/Plan    Following manual, L knee extension almost equals R with PROM and is slightly limited with AROM (to lacking 4 deg AROM). He continues to demonstrate improved functional strength and mobility as shown during sit to stand performance.    12 min unbilled        Timed:  Manual Therapy:    10     mins  90537;  Therapeutic Exercise:    15     mins  93995;     Neuromuscular Ean:   13    mins  60557;    Therapeutic Activity:     0     mins  56101;     Gait Trainin     mins  85777;     Ultrasound:     0     mins  74303;    Electrical Stimulation:    0     mins  98100 ( );    Untimed:  Electrical Stimulation:    0     mins  45913 ( );  Mechanical Traction:    0     mins  76176;     Timed Treatment:   38   mins   Total Treatment:     50   mins    Phani De Guzman PT  Physical Therapist

## 2022-01-12 ENCOUNTER — TREATMENT (OUTPATIENT)
Dept: PHYSICAL THERAPY | Facility: CLINIC | Age: 77
End: 2022-01-12

## 2022-01-12 DIAGNOSIS — S82.832A CLOSED FRACTURE OF PROXIMAL END OF LEFT FIBULA, UNSPECIFIED FRACTURE MORPHOLOGY, INITIAL ENCOUNTER: Primary | ICD-10-CM

## 2022-01-12 PROCEDURE — 97530 THERAPEUTIC ACTIVITIES: CPT | Performed by: PHYSICAL THERAPIST

## 2022-01-12 PROCEDURE — 97110 THERAPEUTIC EXERCISES: CPT | Performed by: PHYSICAL THERAPIST

## 2022-01-12 NOTE — PROGRESS NOTES
Physical Therapy Daily Progress Note        Patient: Papito Salinas   : 1945  Diagnosis/ICD-10 Code:  Closed fracture of proximal end of left fibula, unspecified fracture morphology, initial encounter [S82.832A]  Referring practitioner: Raina Liang, *  Date of Initial Visit: Type: THERAPY  Noted: 2021  Today's Date: 2022  Patient seen for 11 sessions           Patient reports: aggravating his L anterior-lateral knee pain 3 days ago, experiencing pain with every step.       Objective   See Exercise, Manual, and Modality Logs for complete treatment.       Assessment/Plan  Upon assessment, mild to moderate joint edema present and knee ROM is more limited compared to last visit. Mild TTP along medial and lateral joint line, in addition to gastroc medial head. Otherwise unable to provoke symptoms (valgus stress test caused medial knee pain but it was unfamiliar to patient). Resumed interventions focused on gaining knee extension and held squat activities to allow healing time for joint. Less pain reported and improved gait noted after treatment.            Timed:  Manual Therapy:    0     mins  41818;  Therapeutic Exercise:    35     mins  87387;     Neuromuscular Ean:   0    mins  58542;    Therapeutic Activity:     15     mins  90445;     Gait Trainin     mins  70595;     Ultrasound:     0     mins  11286;    Electrical Stimulation:    0     mins  97497 ( );    Untimed:  Electrical Stimulation:    0     mins  69152 ( );  Mechanical Traction:    0     mins  53614;     Timed Treatment:   50   mins   Total Treatment:     50   mins    Phani De Guzman, PT  Physical Therapist

## 2022-01-17 ENCOUNTER — OFFICE VISIT (OUTPATIENT)
Dept: FAMILY MEDICINE CLINIC | Facility: CLINIC | Age: 77
End: 2022-01-17

## 2022-01-17 VITALS
SYSTOLIC BLOOD PRESSURE: 138 MMHG | DIASTOLIC BLOOD PRESSURE: 86 MMHG | TEMPERATURE: 98 F | WEIGHT: 247 LBS | HEART RATE: 87 BPM | RESPIRATION RATE: 15 BRPM | BODY MASS INDEX: 31.7 KG/M2 | HEIGHT: 74 IN | OXYGEN SATURATION: 98 %

## 2022-01-17 DIAGNOSIS — G89.29 CHRONIC BILATERAL LOW BACK PAIN WITH RIGHT-SIDED SCIATICA: ICD-10-CM

## 2022-01-17 DIAGNOSIS — Z12.5 PROSTATE CANCER SCREENING: ICD-10-CM

## 2022-01-17 DIAGNOSIS — M15.9 GENERALIZED OSTEOARTHROSIS, INVOLVING MULTIPLE SITES: ICD-10-CM

## 2022-01-17 DIAGNOSIS — S82.831A CLOSED FRACTURE OF PROXIMAL END OF RIGHT FIBULA, UNSPECIFIED FRACTURE MORPHOLOGY, INITIAL ENCOUNTER: ICD-10-CM

## 2022-01-17 DIAGNOSIS — M10.00 IDIOPATHIC GOUT, UNSPECIFIED CHRONICITY, UNSPECIFIED SITE: Primary | ICD-10-CM

## 2022-01-17 DIAGNOSIS — M54.41 CHRONIC BILATERAL LOW BACK PAIN WITH RIGHT-SIDED SCIATICA: ICD-10-CM

## 2022-01-17 DIAGNOSIS — G47.00 INSOMNIA, UNSPECIFIED TYPE: ICD-10-CM

## 2022-01-17 DIAGNOSIS — I10 ESSENTIAL HYPERTENSION: ICD-10-CM

## 2022-01-17 DIAGNOSIS — E78.2 MIXED HYPERLIPIDEMIA: ICD-10-CM

## 2022-01-17 DIAGNOSIS — M47.817 LUMBOSACRAL SPONDYLOSIS WITHOUT MYELOPATHY: ICD-10-CM

## 2022-01-17 DIAGNOSIS — Z51.81 MEDICATION MONITORING ENCOUNTER: ICD-10-CM

## 2022-01-17 PROCEDURE — 99214 OFFICE O/P EST MOD 30 MIN: CPT | Performed by: FAMILY MEDICINE

## 2022-01-17 RX ORDER — TEMAZEPAM 15 MG/1
15 CAPSULE ORAL NIGHTLY PRN
Qty: 30 CAPSULE | Refills: 5 | Status: SHIPPED | OUTPATIENT
Start: 2022-01-17 | End: 2022-07-18 | Stop reason: SDUPTHER

## 2022-01-17 RX ORDER — HYDROCODONE BITARTRATE AND ACETAMINOPHEN 7.5; 325 MG/1; MG/1
1 TABLET ORAL EVERY 6 HOURS PRN
Qty: 90 TABLET | Refills: 0 | Status: SHIPPED | OUTPATIENT
Start: 2022-01-17 | End: 2022-07-18 | Stop reason: SDUPTHER

## 2022-01-17 NOTE — PROGRESS NOTES
Subjective   Papito Salinas is a 76 y.o. male    Chief Complaint    Back pain   Knee pain   Insomnia   Prescription refills     History of Present Illness  The patient has chronic arthritis pain. He is due for a refill of his hydrocodone. He also needs a refill of his Restoril.    The patient reports that he fell 3 times and fractured his left fibula. He was seen by Ms. Donnie GEORGE and is going to physical therapy in [place]. He states that 6 of the physical therapy sessions are working well for him. The patient reports that he does not have to walk using crutches. He states that his left knee is still swollen. The patient reports that he has to go back and see Ms. Fields on Friday, 01/21/2022. He said he has lost his balance several times.    He reports that his sleeping medication is still working well. He states that he had to take 2 pain pills a day due to his pain. The patient reports that he ran out of his pain medication and has not taken it for over a week.      The following portions of the patient's history were reviewed and updated as appropriate: allergies, current medications, past social history and problem list    Review of Systems   Constitutional: Negative.  Negative for fatigue and unexpected weight change.   Respiratory: Negative.  Negative for cough, chest tightness and shortness of breath.    Cardiovascular: Negative for chest pain, palpitations and leg swelling.   Gastrointestinal: Negative.  Negative for nausea and vomiting.   Musculoskeletal: Positive for arthralgias, back pain and gait problem. Negative for myalgias.   Skin: Negative for color change and rash.   Neurological: Negative for dizziness, tremors, syncope, weakness, light-headedness, numbness and headaches.   Psychiatric/Behavioral: Positive for sleep disturbance. Negative for agitation, behavioral problems, confusion, decreased concentration, dysphoric mood and hallucinations. The patient is not nervous/anxious and is  not hyperactive.        Objective     Vitals:    01/17/22 1055   BP: 138/86   Pulse: 87   Resp: 15   Temp: 98 °F (36.7 °C)   SpO2: 98%       Physical Exam  Vitals and nursing note reviewed.   Constitutional:       General: He is not in acute distress.     Appearance: Normal appearance. He is well-developed. He is not ill-appearing, toxic-appearing or diaphoretic.   HENT:      Head: Normocephalic and atraumatic.   Eyes:      Conjunctiva/sclera: Conjunctivae normal.      Pupils: Pupils are equal, round, and reactive to light.   Neck:      Vascular: No carotid bruit or JVD.   Cardiovascular:      Rate and Rhythm: Normal rate and regular rhythm.      Heart sounds: Normal heart sounds. No murmur heard.      Pulmonary:      Effort: Pulmonary effort is normal. No respiratory distress.      Breath sounds: Normal breath sounds.   Abdominal:      General: Bowel sounds are normal.      Palpations: Abdomen is soft.      Tenderness: There is no abdominal tenderness.   Musculoskeletal:      Lumbar back: Tenderness and bony tenderness present. No swelling, deformity or spasms. Decreased range of motion.      Left knee: Bony tenderness present. Decreased range of motion.   Skin:     General: Skin is warm and dry.   Neurological:      Mental Status: He is alert and oriented to person, place, and time.      Coordination: Coordination normal.      Deep Tendon Reflexes: Reflexes are normal and symmetric.   Psychiatric:         Attention and Perception: He is attentive.         Mood and Affect: Mood normal.         Behavior: Behavior normal.         Thought Content: Thought content normal.         Judgment: Judgment normal.         Assessment/Plan   Problems Addressed this Visit        Musculoskeletal and Injuries    Generalized osteoarthrosis, involving multiple sites    Relevant Orders    CBC (No Diff)    Comprehensive Metabolic Panel    Gout - Primary    Relevant Orders    CBC (No Diff)    Uric Acid       Neuro    Lumbosacral  spondylosis without myelopathy      Other Visit Diagnoses     Insomnia, unspecified type        Relevant Medications    temazepam (RESTORIL) 15 MG capsule    Other Relevant Orders    Comprehensive Metabolic Panel    Closed fracture of proximal end of right fibula, unspecified fracture morphology, initial encounter        Relevant Medications    HYDROcodone-acetaminophen (Norco) 7.5-325 MG per tablet    Chronic bilateral low back pain with right-sided sciatica        Relevant Medications    HYDROcodone-acetaminophen (Norco) 7.5-325 MG per tablet    Other Relevant Orders    CBC (No Diff)    Comprehensive Metabolic Panel    Prostate cancer screening        Relevant Orders    PSA Screen    Medication monitoring encounter        Relevant Orders    CBC (No Diff)    Comprehensive Metabolic Panel    Essential hypertension        Relevant Orders    Comprehensive Metabolic Panel    Lipid Panel    Mixed hyperlipidemia        Relevant Orders    Comprehensive Metabolic Panel    Lipid Panel      Diagnoses       Codes Comments    Idiopathic gout, unspecified chronicity, unspecified site    -  Primary ICD-10-CM: M10.00  ICD-9-CM: 274.9     Generalized osteoarthrosis, involving multiple sites     ICD-10-CM: M15.9  ICD-9-CM: 715.09     Lumbosacral spondylosis without myelopathy     ICD-10-CM: M47.817  ICD-9-CM: 721.3     Insomnia, unspecified type     ICD-10-CM: G47.00  ICD-9-CM: 780.52     Closed fracture of proximal end of right fibula, unspecified fracture morphology, initial encounter     ICD-10-CM: S82.831A  ICD-9-CM: 823.01     Chronic bilateral low back pain with right-sided sciatica     ICD-10-CM: M54.41, G89.29  ICD-9-CM: 724.2, 724.3, 338.29     Prostate cancer screening     ICD-10-CM: Z12.5  ICD-9-CM: V76.44     Medication monitoring encounter     ICD-10-CM: Z51.81  ICD-9-CM: V58.83     Essential hypertension     ICD-10-CM: I10  ICD-9-CM: 401.9     Mixed hyperlipidemia     ICD-10-CM: E78.2  ICD-9-CM: 272.2         I spent  25 minutes in patient care: Reviewing records prior to the visit, examining the patient, entering orders and documentation    Part of this note may be an electronic transcription/translation of spoken language to printed text using the Dragon Dictation System.         Transcribed from ambient dictation for FREEDOM Brewster MD by aMrtha Piña.  01/17/22   13:02 EST    Patient verbalized consent to the visit recording.  I have personally performed the services described in this document as transcribed by the above individual, and it is both accurate and complete.  FREEDOM Brewster MD  1/17/2022  16:39 EST

## 2022-01-19 ENCOUNTER — TREATMENT (OUTPATIENT)
Dept: PHYSICAL THERAPY | Facility: CLINIC | Age: 77
End: 2022-01-19

## 2022-01-19 DIAGNOSIS — S82.832A CLOSED FRACTURE OF PROXIMAL END OF LEFT FIBULA, UNSPECIFIED FRACTURE MORPHOLOGY, INITIAL ENCOUNTER: Primary | ICD-10-CM

## 2022-01-19 PROCEDURE — 97110 THERAPEUTIC EXERCISES: CPT | Performed by: PHYSICAL THERAPIST

## 2022-01-19 PROCEDURE — 97530 THERAPEUTIC ACTIVITIES: CPT | Performed by: PHYSICAL THERAPIST

## 2022-01-19 NOTE — PROGRESS NOTES
Physical Therapy Daily Progress Note        Patient: Papito Salinas   : 1945  Diagnosis/ICD-10 Code:  Closed fracture of proximal end of left fibula, unspecified fracture morphology, initial encounter [S82.832A]  Referring practitioner: Raina Liang, *  Date of Initial Visit: Type: THERAPY  Noted: 2021  Today's Date: 2022  Patient seen for 12 sessions           Patient reports: his L knee is feeling much better than last week with little pain.      Objective   See Exercise, Manual, and Modality Logs for complete treatment.       Assessment/Plan  Added unilateral LE closed chain strengthening with patient demonstrating compensations that avoid loading his L LE when the knee is flexed. Able to resume all activities without c/o pain.      15 min unbilled      Timed:  Manual Therapy:    0     mins  23717;  Therapeutic Exercise:    30     mins  94183;     Neuromuscular Ean:   0    mins  08883;    Therapeutic Activity:     10     mins  56797;     Gait Trainin     mins  97093;     Ultrasound:     0     mins  77675;    Electrical Stimulation:    0     mins  66767 ( );    Untimed:  Electrical Stimulation:    0     mins  45092 ( );  Mechanical Traction:    0     mins  50423;     Timed Treatment:   40   mins   Total Treatment:     55   mins    Phani De Guzman PT  Physical Therapist

## 2022-01-21 ENCOUNTER — OFFICE VISIT (OUTPATIENT)
Dept: ORTHOPEDIC SURGERY | Facility: CLINIC | Age: 77
End: 2022-01-21

## 2022-01-21 VITALS
SYSTOLIC BLOOD PRESSURE: 176 MMHG | WEIGHT: 246.91 LBS | HEIGHT: 74 IN | DIASTOLIC BLOOD PRESSURE: 98 MMHG | BODY MASS INDEX: 31.69 KG/M2

## 2022-01-21 DIAGNOSIS — M17.12 PRIMARY OSTEOARTHRITIS OF LEFT KNEE: Primary | ICD-10-CM

## 2022-01-21 DIAGNOSIS — M11.262 CHONDROCALCINOSIS OF LEFT KNEE: ICD-10-CM

## 2022-01-21 DIAGNOSIS — S82.832D OTHER CLOSED FRACTURE OF PROXIMAL END OF LEFT FIBULA WITH ROUTINE HEALING, SUBSEQUENT ENCOUNTER: ICD-10-CM

## 2022-01-21 PROCEDURE — 99213 OFFICE O/P EST LOW 20 MIN: CPT | Performed by: PHYSICIAN ASSISTANT

## 2022-01-21 NOTE — PROGRESS NOTES
"    [facility] Note        Subjective     CC: Follow-up (6 weeks follow up Other closed fracture of proximal end of left fibula (DOI: Approximately 9/29/21)      HPI    Papito Salinas is a 76 y.o. male.  Patient returns today for follow-up of his left knee and proximal left fibula fracture.  States since starting physical therapy he is doing great.  All pain is resolved.  He is not having no issues.    Pain scale: 0/10.    Overall, patient's symptoms are improved.    ROS:    Constiutional:Pt denies fever, chills, nausea, or vomiting.  MSK:as above        Objective      Past Medical History  Past Medical History:   Diagnosis Date   • GERD (gastroesophageal reflux disease)    • Hypertension    • Insomnia    • JEAN (obstructive sleep apnea)    • Plantar fascial fibromatosis          Physical Exam  /98   Ht 188 cm (74.02\")   Wt 112 kg (246 lb 14.6 oz)   BMI 31.69 kg/m²     Body mass index is 31.69 kg/m².    Patient is well nourished and well developed.        Ortho Exam  Left knee/proximal fibula  Alignment: Genu varum  Skin: Grossly intact without any redness or warmth.   Trace effusion.   Tenderness:  No tenderness noted to the knee or proximal fibula.  Motion: 0-120 degrees.  Testing: Varus and valgus stress test negative.  Lachman negative.  Motor/sensory: Grossly intact L2-S1.      Imaging/Labs/EMG Reviewed:  No new imaging today.      Assessment:  1. Primary osteoarthritis of left knee    2. Chondrocalcinosis of left knee    3. Other closed fracture of proximal end of left fibula with routine healing, subsequent encounter        Plan:  1. Left primary knee osteoarthritis and chondrocalcinosis, stable.  2. Left proximal fibula fracture, healed.  3. Patient may continue working with PT until he is met all discharge criteria.  Would recommend once discharge she continue with maintenance program is to maintain motion and strength.  4. Recommend over-the-counter pain medication as " needed.  5. Follow-up as needed.  If his knee becomes symptomatic he can return to the clinic for discussion of possible corticosteroid injection.  6. Questions and concerns answered.      Raina Liang PA-C  01/24/22  11:28 EST      Dictated Utilizing Dragon Dictation.

## 2022-02-02 ENCOUNTER — LAB (OUTPATIENT)
Dept: LAB | Facility: HOSPITAL | Age: 77
End: 2022-02-02

## 2022-02-02 DIAGNOSIS — Z51.81 MEDICATION MONITORING ENCOUNTER: ICD-10-CM

## 2022-02-02 DIAGNOSIS — G89.29 CHRONIC BILATERAL LOW BACK PAIN WITH RIGHT-SIDED SCIATICA: ICD-10-CM

## 2022-02-02 DIAGNOSIS — Z12.5 PROSTATE CANCER SCREENING: ICD-10-CM

## 2022-02-02 DIAGNOSIS — M10.00 IDIOPATHIC GOUT, UNSPECIFIED CHRONICITY, UNSPECIFIED SITE: ICD-10-CM

## 2022-02-02 DIAGNOSIS — I10 ESSENTIAL HYPERTENSION: ICD-10-CM

## 2022-02-02 DIAGNOSIS — E78.2 MIXED HYPERLIPIDEMIA: ICD-10-CM

## 2022-02-02 DIAGNOSIS — M15.9 GENERALIZED OSTEOARTHROSIS, INVOLVING MULTIPLE SITES: ICD-10-CM

## 2022-02-02 DIAGNOSIS — M54.41 CHRONIC BILATERAL LOW BACK PAIN WITH RIGHT-SIDED SCIATICA: ICD-10-CM

## 2022-02-02 DIAGNOSIS — G47.00 INSOMNIA, UNSPECIFIED TYPE: ICD-10-CM

## 2022-02-02 LAB
ALBUMIN SERPL-MCNC: 3.7 G/DL (ref 3.5–5.2)
ALBUMIN/GLOB SERPL: 1.2 G/DL
ALP SERPL-CCNC: 104 U/L (ref 39–117)
ALT SERPL W P-5'-P-CCNC: 14 U/L (ref 1–41)
ANION GAP SERPL CALCULATED.3IONS-SCNC: 8.5 MMOL/L (ref 5–15)
AST SERPL-CCNC: 19 U/L (ref 1–40)
BILIRUB SERPL-MCNC: 0.5 MG/DL (ref 0–1.2)
BUN SERPL-MCNC: 10 MG/DL (ref 8–23)
BUN/CREAT SERPL: 8.8 (ref 7–25)
CALCIUM SPEC-SCNC: 9.1 MG/DL (ref 8.6–10.5)
CHLORIDE SERPL-SCNC: 104 MMOL/L (ref 98–107)
CHOLEST SERPL-MCNC: 170 MG/DL (ref 0–200)
CO2 SERPL-SCNC: 28.5 MMOL/L (ref 22–29)
CREAT SERPL-MCNC: 1.13 MG/DL (ref 0.76–1.27)
DEPRECATED RDW RBC AUTO: 46.4 FL (ref 37–54)
ERYTHROCYTE [DISTWIDTH] IN BLOOD BY AUTOMATED COUNT: 14 % (ref 12.3–15.4)
GFR SERPL CREATININE-BSD FRML MDRD: 63 ML/MIN/1.73
GLOBULIN UR ELPH-MCNC: 3.2 GM/DL
GLUCOSE SERPL-MCNC: 103 MG/DL (ref 65–99)
HCT VFR BLD AUTO: 43.7 % (ref 37.5–51)
HDLC SERPL-MCNC: 31 MG/DL (ref 40–60)
HGB BLD-MCNC: 14.4 G/DL (ref 13–17.7)
LDLC SERPL CALC-MCNC: 112 MG/DL (ref 0–100)
LDLC/HDLC SERPL: 3.51 {RATIO}
MCH RBC QN AUTO: 29.8 PG (ref 26.6–33)
MCHC RBC AUTO-ENTMCNC: 33 G/DL (ref 31.5–35.7)
MCV RBC AUTO: 90.5 FL (ref 79–97)
PLATELET # BLD AUTO: 214 10*3/MM3 (ref 140–450)
PMV BLD AUTO: 10.4 FL (ref 6–12)
POTASSIUM SERPL-SCNC: 3.9 MMOL/L (ref 3.5–5.2)
PROT SERPL-MCNC: 6.9 G/DL (ref 6–8.5)
PSA SERPL-MCNC: 0.39 NG/ML (ref 0–4)
RBC # BLD AUTO: 4.83 10*6/MM3 (ref 4.14–5.8)
SODIUM SERPL-SCNC: 141 MMOL/L (ref 136–145)
TRIGL SERPL-MCNC: 151 MG/DL (ref 0–150)
URATE SERPL-MCNC: 4.3 MG/DL (ref 3.4–7)
VLDLC SERPL-MCNC: 27 MG/DL (ref 5–40)
WBC NRBC COR # BLD: 6.76 10*3/MM3 (ref 3.4–10.8)

## 2022-02-02 PROCEDURE — 80061 LIPID PANEL: CPT

## 2022-02-02 PROCEDURE — 84550 ASSAY OF BLOOD/URIC ACID: CPT

## 2022-02-02 PROCEDURE — G0103 PSA SCREENING: HCPCS

## 2022-02-02 PROCEDURE — 36415 COLL VENOUS BLD VENIPUNCTURE: CPT

## 2022-02-02 PROCEDURE — 85027 COMPLETE CBC AUTOMATED: CPT

## 2022-02-02 PROCEDURE — 80053 COMPREHEN METABOLIC PANEL: CPT

## 2022-07-04 DIAGNOSIS — I10 ESSENTIAL HYPERTENSION: ICD-10-CM

## 2022-07-04 DIAGNOSIS — M10.00 IDIOPATHIC GOUT, UNSPECIFIED CHRONICITY, UNSPECIFIED SITE: ICD-10-CM

## 2022-07-05 RX ORDER — ALLOPURINOL 300 MG/1
TABLET ORAL
Qty: 90 TABLET | Refills: 3 | Status: SHIPPED | OUTPATIENT
Start: 2022-07-05

## 2022-07-05 RX ORDER — METOPROLOL SUCCINATE 100 MG/1
TABLET, EXTENDED RELEASE ORAL
Qty: 90 TABLET | Refills: 3 | Status: SHIPPED | OUTPATIENT
Start: 2022-07-05

## 2022-07-18 ENCOUNTER — OFFICE VISIT (OUTPATIENT)
Dept: FAMILY MEDICINE CLINIC | Facility: CLINIC | Age: 77
End: 2022-07-18

## 2022-07-18 VITALS
RESPIRATION RATE: 12 BRPM | HEART RATE: 65 BPM | HEIGHT: 74 IN | BODY MASS INDEX: 31.9 KG/M2 | WEIGHT: 248.6 LBS | SYSTOLIC BLOOD PRESSURE: 138 MMHG | DIASTOLIC BLOOD PRESSURE: 76 MMHG | OXYGEN SATURATION: 93 %

## 2022-07-18 DIAGNOSIS — S82.831A CLOSED FRACTURE OF PROXIMAL END OF RIGHT FIBULA, UNSPECIFIED FRACTURE MORPHOLOGY, INITIAL ENCOUNTER: ICD-10-CM

## 2022-07-18 DIAGNOSIS — M54.41 CHRONIC BILATERAL LOW BACK PAIN WITH RIGHT-SIDED SCIATICA: ICD-10-CM

## 2022-07-18 DIAGNOSIS — G89.29 CHRONIC BILATERAL LOW BACK PAIN WITH RIGHT-SIDED SCIATICA: ICD-10-CM

## 2022-07-18 DIAGNOSIS — M15.9 GENERALIZED OSTEOARTHROSIS, INVOLVING MULTIPLE SITES: ICD-10-CM

## 2022-07-18 DIAGNOSIS — M10.00 IDIOPATHIC GOUT, UNSPECIFIED CHRONICITY, UNSPECIFIED SITE: ICD-10-CM

## 2022-07-18 DIAGNOSIS — G47.00 INSOMNIA, UNSPECIFIED TYPE: ICD-10-CM

## 2022-07-18 DIAGNOSIS — I10 ESSENTIAL HYPERTENSION: Primary | ICD-10-CM

## 2022-07-18 PROCEDURE — 99214 OFFICE O/P EST MOD 30 MIN: CPT | Performed by: FAMILY MEDICINE

## 2022-07-18 RX ORDER — HYDROCODONE BITARTRATE AND ACETAMINOPHEN 7.5; 325 MG/1; MG/1
1 TABLET ORAL EVERY 6 HOURS PRN
Qty: 90 TABLET | Refills: 0 | Status: SHIPPED | OUTPATIENT
Start: 2022-07-18 | End: 2023-01-18

## 2022-07-18 RX ORDER — PREDNISONE 20 MG/1
20 TABLET ORAL DAILY
Qty: 30 TABLET | Refills: 2 | Status: SHIPPED | OUTPATIENT
Start: 2022-07-18

## 2022-07-18 RX ORDER — TEMAZEPAM 15 MG/1
15 CAPSULE ORAL NIGHTLY PRN
Qty: 30 CAPSULE | Refills: 5 | Status: SHIPPED | OUTPATIENT
Start: 2022-07-18 | End: 2023-01-18 | Stop reason: SDUPTHER

## 2022-07-20 ENCOUNTER — TELEPHONE (OUTPATIENT)
Dept: FAMILY MEDICINE CLINIC | Facility: CLINIC | Age: 77
End: 2022-07-20

## 2022-07-20 DIAGNOSIS — G89.29 CHRONIC BILATERAL LOW BACK PAIN WITH RIGHT-SIDED SCIATICA: ICD-10-CM

## 2022-07-20 DIAGNOSIS — I10 ESSENTIAL HYPERTENSION: ICD-10-CM

## 2022-07-20 DIAGNOSIS — S82.831A CLOSED FRACTURE OF PROXIMAL END OF RIGHT FIBULA, UNSPECIFIED FRACTURE MORPHOLOGY, INITIAL ENCOUNTER: ICD-10-CM

## 2022-07-20 DIAGNOSIS — M54.41 CHRONIC BILATERAL LOW BACK PAIN WITH RIGHT-SIDED SCIATICA: ICD-10-CM

## 2022-07-20 RX ORDER — AMITRIPTYLINE HYDROCHLORIDE 10 MG/1
TABLET, FILM COATED ORAL
Qty: 90 TABLET | Refills: 3 | Status: SHIPPED | OUTPATIENT
Start: 2022-07-20 | End: 2023-01-18

## 2022-07-20 RX ORDER — DOXAZOSIN MESYLATE 4 MG/1
TABLET ORAL
Qty: 90 TABLET | Refills: 3 | Status: SHIPPED | OUTPATIENT
Start: 2022-07-20

## 2022-07-20 RX ORDER — OMEPRAZOLE 40 MG/1
CAPSULE, DELAYED RELEASE ORAL
Qty: 90 CAPSULE | Refills: 3 | Status: SHIPPED | OUTPATIENT
Start: 2022-07-20

## 2022-07-20 RX ORDER — VALSARTAN 160 MG/1
TABLET ORAL
Qty: 90 TABLET | Refills: 3 | Status: SHIPPED | OUTPATIENT
Start: 2022-07-20

## 2022-07-20 NOTE — TELEPHONE ENCOUNTER
Rx Refill Note  Requested Prescriptions     Pending Prescriptions Disp Refills   • amitriptyline (ELAVIL) 10 MG tablet [Pharmacy Med Name: AMITRIPTYLINE HCL 10 MG TAB] 90 tablet 3     Sig: TAKE 1 TABLET BY MOUTH EVERY DAY AT NIGHT   • valsartan (DIOVAN) 160 MG tablet [Pharmacy Med Name: VALSARTAN 160 MG TABLET] 90 tablet 3     Sig: TAKE 1 TABLET BY MOUTH EVERY DAY   • doxazosin (CARDURA) 4 MG tablet [Pharmacy Med Name: DOXAZOSIN MESYLATE 4 MG TAB] 90 tablet 3     Sig: TAKE 1 TABLET BY MOUTH EVERYDAY AT BEDTIME   • omeprazole (priLOSEC) 40 MG capsule [Pharmacy Med Name: OMEPRAZOLE DR 40 MG CAPSULE] 90 capsule 3     Sig: TAKE 1 CAPSULE BY MOUTH EVERY DAY      Last office visit with prescribing clinician: 7/18/2022      Next office visit with prescribing clinician: 1/18/2023            Anna Adame MA  07/20/22, 13:37 EDT

## 2022-07-20 NOTE — TELEPHONE ENCOUNTER
Caller: Papito Salinas    Relationship: Self    Best call back number: [phone]    Requested Prescriptions:   Requested Prescriptions     Pending Prescriptions Disp Refills   • HYDROcodone-acetaminophen (Norco) 7.5-325 MG per tablet 90 tablet 0     Sig: Take 1 tablet by mouth Every 6 (Six) Hours As Needed for Moderate Pain .        Pharmacy where request should be sent: [facility]/PHARMACY #[id number] - [address] [phone] [facility] [phone] FX     Additional details provided by patient: WAITING ON PRIOR AUTHORIZATION FROM THE DR OFFICE FOR THE PHARMACY    Does the patient have less than a 3 day supply:  [x] Yes  [] No    Homero SAUER Rep   07/20/22 09:25 EDT

## 2022-07-22 RX ORDER — HYDROCODONE BITARTRATE AND ACETAMINOPHEN 7.5; 325 MG/1; MG/1
1 TABLET ORAL EVERY 6 HOURS PRN
Qty: 90 TABLET | Refills: 0 | OUTPATIENT
Start: 2022-07-22

## 2022-07-22 NOTE — TELEPHONE ENCOUNTER
Caller: Papito Salinas    Relationship to patient: Self    Best call back number: [phone]    Patient is needing: PATIENT STATED THAT HE WAS CALLING TO CHECK ON PRIOR AUTHORIZATION STATUS    PLEASE ADVISE TODAY PLEASE

## 2023-01-18 ENCOUNTER — OFFICE VISIT (OUTPATIENT)
Dept: FAMILY MEDICINE CLINIC | Facility: CLINIC | Age: 78
End: 2023-01-18
Payer: MEDICARE

## 2023-01-18 VITALS
OXYGEN SATURATION: 97 % | TEMPERATURE: 96.6 F | BODY MASS INDEX: 32.62 KG/M2 | DIASTOLIC BLOOD PRESSURE: 80 MMHG | WEIGHT: 254.2 LBS | RESPIRATION RATE: 15 BRPM | HEART RATE: 70 BPM | SYSTOLIC BLOOD PRESSURE: 150 MMHG | HEIGHT: 74 IN

## 2023-01-18 DIAGNOSIS — M10.00 IDIOPATHIC GOUT, UNSPECIFIED CHRONICITY, UNSPECIFIED SITE: ICD-10-CM

## 2023-01-18 DIAGNOSIS — Z12.5 PROSTATE CANCER SCREENING: ICD-10-CM

## 2023-01-18 DIAGNOSIS — I10 ESSENTIAL HYPERTENSION: ICD-10-CM

## 2023-01-18 DIAGNOSIS — G47.00 INSOMNIA, UNSPECIFIED TYPE: ICD-10-CM

## 2023-01-18 DIAGNOSIS — M15.9 GENERALIZED OSTEOARTHROSIS, INVOLVING MULTIPLE SITES: ICD-10-CM

## 2023-01-18 DIAGNOSIS — Z51.81 MEDICATION MONITORING ENCOUNTER: ICD-10-CM

## 2023-01-18 DIAGNOSIS — G89.29 CHRONIC BILATERAL LOW BACK PAIN WITH RIGHT-SIDED SCIATICA: ICD-10-CM

## 2023-01-18 DIAGNOSIS — E78.2 MIXED HYPERLIPIDEMIA: ICD-10-CM

## 2023-01-18 DIAGNOSIS — Z00.00 MEDICARE ANNUAL WELLNESS VISIT, SUBSEQUENT: Primary | ICD-10-CM

## 2023-01-18 DIAGNOSIS — M54.41 CHRONIC BILATERAL LOW BACK PAIN WITH RIGHT-SIDED SCIATICA: ICD-10-CM

## 2023-01-18 PROCEDURE — 1159F MED LIST DOCD IN RCRD: CPT | Performed by: FAMILY MEDICINE

## 2023-01-18 PROCEDURE — G0439 PPPS, SUBSEQ VISIT: HCPCS | Performed by: FAMILY MEDICINE

## 2023-01-18 PROCEDURE — 1170F FXNL STATUS ASSESSED: CPT | Performed by: FAMILY MEDICINE

## 2023-01-18 RX ORDER — HYDROCODONE BITARTRATE AND ACETAMINOPHEN 7.5; 325 MG/1; MG/1
1 TABLET ORAL EVERY 6 HOURS PRN
Qty: 90 TABLET | Refills: 0 | Status: SHIPPED | OUTPATIENT
Start: 2023-01-18

## 2023-01-18 RX ORDER — TEMAZEPAM 15 MG/1
15 CAPSULE ORAL NIGHTLY PRN
Qty: 30 CAPSULE | Refills: 5 | Status: SHIPPED | OUTPATIENT
Start: 2023-01-18

## 2023-01-18 RX ORDER — AMITRIPTYLINE HYDROCHLORIDE 25 MG/1
25 TABLET, FILM COATED ORAL NIGHTLY
Qty: 90 TABLET | Refills: 3 | Status: SHIPPED | OUTPATIENT
Start: 2023-01-18

## 2023-01-18 NOTE — PROGRESS NOTES
The ABCs of the Annual Wellness Visit  Subsequent Medicare Wellness Visit    Chief Complaint   Patient presents with   • Medicare Wellness-subsequent      Subjective   History of Present Illness:  Papito Salinas is a 77 y.o. male who presents for a Subsequent Medicare Wellness Visit.    The following portions of the patient's history were reviewed and   updated as appropriate: allergies, current medications, past family history, past medical history, past social history, past surgical history and problem list.    Compared to one year ago, the patient feels his physical   health is the same.    Compared to one year ago, the patient feels his mental   health is the same.    Recent Hospitalizations:  He was not admitted to the hospital during the last year.       Current Medical Providers:  Patient Care Team:  Dick Brewster MD as PCP - General    Outpatient Medications Prior to Visit   Medication Sig Dispense Refill   • allopurinol (ZYLOPRIM) 300 MG tablet TAKE 1 TABLET BY MOUTH EVERY DAY 90 tablet 3   • amitriptyline (ELAVIL) 10 MG tablet TAKE 1 TABLET BY MOUTH EVERY DAY AT NIGHT 90 tablet 3   • doxazosin (CARDURA) 4 MG tablet TAKE 1 TABLET BY MOUTH EVERYDAY AT BEDTIME 90 tablet 3   • metoprolol succinate XL (TOPROL-XL) 100 MG 24 hr tablet TAKE 1 TABLET BY MOUTH EVERY DAY 90 tablet 3   • omeprazole (priLOSEC) 40 MG capsule TAKE 1 CAPSULE BY MOUTH EVERY DAY 90 capsule 3   • predniSONE (DELTASONE) 20 MG tablet Take 1 tablet by mouth Daily. 30 tablet 2   • temazepam (RESTORIL) 15 MG capsule Take 1 capsule by mouth At Night As Needed for Sleep. 30 capsule 5   • valsartan (DIOVAN) 160 MG tablet TAKE 1 TABLET BY MOUTH EVERY DAY 90 tablet 3   • HYDROcodone-acetaminophen (Norco) 7.5-325 MG per tablet Take 1 tablet by mouth Every 6 (Six) Hours As Needed for Moderate Pain . 90 tablet 0   • LIALDA 1.2 G EC tablet Take 1 tablet by mouth 2 (Two) Times a Day.  3     No facility-administered medications prior to  "visit.       No opioid medication identified on active medication list. I have reviewed chart for other potential  high risk medication/s and harmful drug interactions in the elderly.          Aspirin is not on active medication list.  Aspirin use is not indicated based on review of current medical condition/s. Risk of harm outweighs potential benefits.  .    Patient Active Problem List   Diagnosis   • Lumbosacral spondylosis without myelopathy   • Generalized osteoarthrosis, involving multiple sites   • Gout   • Esophageal reflux   • Hypertrophy of prostate without urinary obstruction   • Tobacco abuse     Advance Care Planning  has an advanced directive - a copy HAS NOT been provided    Review of Systems      Objective      Vitals:    01/18/23 1107   BP: 150/80   Pulse: 70   Resp: 15   Temp: 96.6 °F (35.9 °C)   SpO2: 97%   Weight: 115 kg (254 lb 3.2 oz)   Height: 188 cm (74.02\")   PainSc: 0-No pain     BMI Readings from Last 1 Encounters:   01/18/23 32.62 kg/m²   BMI is above normal parameters. Recommendations include: nutrition counseling    Does the patient have evidence of cognitive impairment? No    Physical Exam            HEALTH RISK ASSESSMENT    Smoking Status:  Social History     Tobacco Use   Smoking Status Every Day   • Packs/day: 0.50   • Years: 50.00   • Pack years: 25.00   • Types: Cigarettes   Smokeless Tobacco Former   • Types: Chew     Alcohol Consumption:  Social History     Substance and Sexual Activity   Alcohol Use Yes    Comment: occasional     Fall Risk Screen:    MARII Fall Risk Assessment was completed, and patient is at LOW risk for falls.Assessment completed on:1/18/2023    Depression Screening:  PHQ-2/PHQ-9 Depression Screening 1/18/2023   Retired Total Score -   Little Interest or Pleasure in Doing Things 0-->not at all   Feeling Down, Depressed or Hopeless 0-->not at all   PHQ-9: Brief Depression Severity Measure Score 0       Health Habits and Functional and Cognitive " Screening:  Functional & Cognitive Status 1/13/2020   Do you have difficulty preparing food and eating? No   Do you have difficulty bathing yourself, getting dressed or grooming yourself? No   Do you have difficulty using the toilet? No   Do you have difficulty moving around from place to place? No   Do you have trouble with steps or getting out of a bed or a chair? No   Current Diet Well Balanced Diet   Dental Exam Not up to date   Eye Exam Not up to date   Exercise (times per week) 0 times per week   Current Exercise Activities Include None   Do you need help using the phone?  No   Are you deaf or do you have serious difficulty hearing?  No   Do you need help with transportation? No   Do you need help shopping? No   Do you need help preparing meals?  No   Do you need help with housework?  No   Do you need help with laundry? No   Do you need help taking your medications? No   Do you need help managing money? No   Do you ever drive or ride in a car without wearing a seat belt? No       Age-appropriate Screening Schedule:  Refer to the list below for future screening recommendations based on patient's age, sex and/or medical conditions. Orders for these recommended tests are listed in the plan section. The patient has been provided with a written plan.    Health Maintenance   Topic Date Due   • ZOSTER VACCINE (1 of 2) 01/18/2023 (Originally 10/10/1996)   • TDAP/TD VACCINES (1 - Tdap) 01/18/2024 (Originally 10/10/1964)   • LIPID PANEL  02/02/2023   • INFLUENZA VACCINE  Completed              Assessment & Plan     CMS Preventative Services Quick Reference  Risk Factors Identified During Encounter  Chronic Pain: Natural history and expected course discussed. Questions answered.  The above risks/problems have been discussed with the patient.  Follow up actions/plans if indicated are seen below in the Assessment/Plan Section.  Pertinent information has been shared with the patient in the After Visit Summary.    There are  no diagnoses linked to this encounter.    Follow Up:  Return in about 6 months (around 7/18/2023) for Recheck.     An After Visit Summary and PPPS were given to the patient.

## 2023-01-27 ENCOUNTER — LAB (OUTPATIENT)
Dept: LAB | Facility: HOSPITAL | Age: 78
End: 2023-01-27
Payer: MEDICARE

## 2023-01-27 DIAGNOSIS — G47.00 INSOMNIA, UNSPECIFIED TYPE: ICD-10-CM

## 2023-01-27 DIAGNOSIS — M10.00 IDIOPATHIC GOUT, UNSPECIFIED CHRONICITY, UNSPECIFIED SITE: ICD-10-CM

## 2023-01-27 DIAGNOSIS — I10 ESSENTIAL HYPERTENSION: ICD-10-CM

## 2023-01-27 DIAGNOSIS — Z12.5 PROSTATE CANCER SCREENING: ICD-10-CM

## 2023-01-27 DIAGNOSIS — Z51.81 MEDICATION MONITORING ENCOUNTER: ICD-10-CM

## 2023-01-27 DIAGNOSIS — E78.2 MIXED HYPERLIPIDEMIA: ICD-10-CM

## 2023-01-27 LAB
ALBUMIN SERPL-MCNC: 3.8 G/DL (ref 3.5–5.2)
ALBUMIN/GLOB SERPL: 1.2 G/DL
ALP SERPL-CCNC: 100 U/L (ref 39–117)
ALT SERPL W P-5'-P-CCNC: 22 U/L (ref 1–41)
ANION GAP SERPL CALCULATED.3IONS-SCNC: 9.7 MMOL/L (ref 5–15)
AST SERPL-CCNC: 31 U/L (ref 1–40)
BILIRUB SERPL-MCNC: 0.5 MG/DL (ref 0–1.2)
BUN SERPL-MCNC: 13 MG/DL (ref 8–23)
BUN/CREAT SERPL: 11 (ref 7–25)
CALCIUM SPEC-SCNC: 9 MG/DL (ref 8.6–10.5)
CHLORIDE SERPL-SCNC: 105 MMOL/L (ref 98–107)
CHOLEST SERPL-MCNC: 144 MG/DL (ref 0–200)
CO2 SERPL-SCNC: 26.3 MMOL/L (ref 22–29)
CREAT SERPL-MCNC: 1.18 MG/DL (ref 0.76–1.27)
DEPRECATED RDW RBC AUTO: 45.1 FL (ref 37–54)
EGFRCR SERPLBLD CKD-EPI 2021: 64 ML/MIN/1.73
ERYTHROCYTE [DISTWIDTH] IN BLOOD BY AUTOMATED COUNT: 13.5 % (ref 12.3–15.4)
GLOBULIN UR ELPH-MCNC: 3.3 GM/DL
GLUCOSE SERPL-MCNC: 96 MG/DL (ref 65–99)
HCT VFR BLD AUTO: 44.1 % (ref 37.5–51)
HDLC SERPL-MCNC: 33 MG/DL (ref 40–60)
HGB BLD-MCNC: 15.2 G/DL (ref 13–17.7)
LDLC SERPL CALC-MCNC: 80 MG/DL (ref 0–100)
LDLC/HDLC SERPL: 2.27 {RATIO}
MCH RBC QN AUTO: 31.3 PG (ref 26.6–33)
MCHC RBC AUTO-ENTMCNC: 34.5 G/DL (ref 31.5–35.7)
MCV RBC AUTO: 90.7 FL (ref 79–97)
PLATELET # BLD AUTO: 158 10*3/MM3 (ref 140–450)
PMV BLD AUTO: 10.7 FL (ref 6–12)
POTASSIUM SERPL-SCNC: 4.5 MMOL/L (ref 3.5–5.2)
PROT SERPL-MCNC: 7.1 G/DL (ref 6–8.5)
PSA SERPL-MCNC: 0.47 NG/ML (ref 0–4)
RBC # BLD AUTO: 4.86 10*6/MM3 (ref 4.14–5.8)
SODIUM SERPL-SCNC: 141 MMOL/L (ref 136–145)
T4 FREE SERPL-MCNC: 1.15 NG/DL (ref 0.93–1.7)
TRIGL SERPL-MCNC: 180 MG/DL (ref 0–150)
TSH SERPL DL<=0.05 MIU/L-ACNC: 1.56 UIU/ML (ref 0.27–4.2)
URATE SERPL-MCNC: 4.6 MG/DL (ref 3.4–7)
VLDLC SERPL-MCNC: 31 MG/DL (ref 5–40)
WBC NRBC COR # BLD: 5.63 10*3/MM3 (ref 3.4–10.8)

## 2023-01-27 PROCEDURE — 84439 ASSAY OF FREE THYROXINE: CPT

## 2023-01-27 PROCEDURE — G0103 PSA SCREENING: HCPCS

## 2023-01-27 PROCEDURE — 84443 ASSAY THYROID STIM HORMONE: CPT

## 2023-01-27 PROCEDURE — 80053 COMPREHEN METABOLIC PANEL: CPT

## 2023-01-27 PROCEDURE — 85027 COMPLETE CBC AUTOMATED: CPT

## 2023-01-27 PROCEDURE — 80061 LIPID PANEL: CPT

## 2023-01-27 PROCEDURE — 36415 COLL VENOUS BLD VENIPUNCTURE: CPT

## 2023-01-27 PROCEDURE — 84550 ASSAY OF BLOOD/URIC ACID: CPT

## 2023-04-10 ENCOUNTER — TELEPHONE (OUTPATIENT)
Dept: FAMILY MEDICINE CLINIC | Facility: CLINIC | Age: 78
End: 2023-04-10
Payer: MEDICARE

## 2023-04-10 RX ORDER — CLARITHROMYCIN 500 MG/1
500 TABLET, COATED ORAL 2 TIMES DAILY
Qty: 20 TABLET | Refills: 0 | Status: SHIPPED | OUTPATIENT
Start: 2023-04-10 | End: 2023-04-20

## 2023-04-10 NOTE — TELEPHONE ENCOUNTER
"Caller: Papito Salinas \"Geoff\"    Relationship: Self    Best call back number: [phone]    What medication are you requesting: ANTIBIOTIC    What are your current symptoms: DRAINAGE, TOOTHACHES, HEADACHES PRESSURE 04/07/23 TAKEN SUDAFED NO RELIEF    How long have you been experiencing symptoms:  04/07/23    Have you had these symptoms before:    [x] Yes  [] No    Have you been treated for these symptoms before:   [x] Yes  [] No    If a prescription is needed, what is your preferred pharmacy and phone number: [facility]/PHARMACY #[id number] - [place], [place] - [address] - [phone]  - [phone] FX     Additional notes: PATIENT THINK HE HAS A SINUS INFECTION AND IS NEEDING SOME RELIEF FOR HIS SYMPTOMS.    PLEASE CALL PATIENT ONCE THE REQUEST IS APPROVED AND SENT TO THE PHARMACY          "

## 2023-07-31 RX ORDER — PREDNISONE 20 MG/1
TABLET ORAL
Qty: 30 TABLET | Refills: 2 | Status: SHIPPED | OUTPATIENT
Start: 2023-07-31

## 2023-08-25 DIAGNOSIS — M54.41 CHRONIC BILATERAL LOW BACK PAIN WITH RIGHT-SIDED SCIATICA: ICD-10-CM

## 2023-08-25 DIAGNOSIS — G89.29 CHRONIC BILATERAL LOW BACK PAIN WITH RIGHT-SIDED SCIATICA: ICD-10-CM

## 2023-08-25 RX ORDER — HYDROCODONE BITARTRATE AND ACETAMINOPHEN 7.5; 325 MG/1; MG/1
1 TABLET ORAL EVERY 6 HOURS PRN
Qty: 90 TABLET | Refills: 0 | Status: SHIPPED | OUTPATIENT
Start: 2023-08-25

## 2023-08-25 NOTE — TELEPHONE ENCOUNTER
"    Caller: Papito Salinas \"Geoff\"    Relationship: Self    Best call back number: [phone]     Requested Prescriptions:   Requested Prescriptions     Pending Prescriptions Disp Refills    HYDROcodone-acetaminophen (Norco) 7.5-325 MG per tablet 90 tablet 0     Sig: Take 1 tablet by mouth Every 6 (Six) Hours As Needed for Moderate Pain.        Pharmacy where request should be sent: [facility]/PHARMACY #[id number] - [place], [place] - [address] - [phone]  - [phone]     Last office visit with prescribing clinician: 7/18/2023   Last telemedicine visit with prescribing clinician: Visit date not found   Next office visit with prescribing clinician: 1/23/2024     Additional details provided by patient: PATIENT HAS ENOUGH TO LAST A WEEK OR SO.    Does the patient have less than a 3 day supply:  [] Yes  [x] No    Would you like a call back once the refill request has been completed: [] Yes [x] No    If the office needs to give you a call back, can they leave a voicemail: [] Yes [x] No    Homero Talbot Rep   08/25/23 13:34 EDT           "

## 2023-10-26 RX ORDER — AMITRIPTYLINE HYDROCHLORIDE 10 MG/1
TABLET, FILM COATED ORAL
Qty: 90 TABLET | Refills: 3 | Status: SHIPPED | OUTPATIENT
Start: 2023-10-26

## 2023-10-26 NOTE — TELEPHONE ENCOUNTER
"    Caller: Papito Salinas \"Geoff\"    Relationship to patient: Self    Best call back number: [phone]     Patient is needing: PATIENT STATED PHARMACY HAS NOT RECEIVED HIS REFILL REQUEST.    PLEASE ADVISE        "

## 2024-01-21 DIAGNOSIS — G47.00 INSOMNIA, UNSPECIFIED TYPE: ICD-10-CM

## 2024-01-22 RX ORDER — AMITRIPTYLINE HYDROCHLORIDE 25 MG/1
25 TABLET, FILM COATED ORAL
Qty: 90 TABLET | Refills: 3 | Status: SHIPPED | OUTPATIENT
Start: 2024-01-22

## 2024-01-22 RX ORDER — TEMAZEPAM 15 MG/1
15 CAPSULE ORAL NIGHTLY PRN
Qty: 30 CAPSULE | Refills: 5 | Status: SHIPPED | OUTPATIENT
Start: 2024-01-22

## 2024-01-23 ENCOUNTER — HOSPITAL ENCOUNTER (OUTPATIENT)
Dept: GENERAL RADIOLOGY | Facility: HOSPITAL | Age: 79
Discharge: HOME | End: 2024-01-23
Payer: MEDICARE

## 2024-01-23 ENCOUNTER — OFFICE VISIT (OUTPATIENT)
Dept: FAMILY MEDICINE CLINIC | Facility: CLINIC | Age: 79
End: 2024-01-23
Payer: MEDICARE

## 2024-01-23 ENCOUNTER — LAB (OUTPATIENT)
Dept: LAB | Facility: HOSPITAL | Age: 79
End: 2024-01-23
Payer: MEDICARE

## 2024-01-23 VITALS
HEIGHT: 74 IN | TEMPERATURE: 96.5 F | HEART RATE: 65 BPM | WEIGHT: 252.4 LBS | BODY MASS INDEX: 32.39 KG/M2 | RESPIRATION RATE: 16 BRPM | DIASTOLIC BLOOD PRESSURE: 80 MMHG | OXYGEN SATURATION: 98 % | SYSTOLIC BLOOD PRESSURE: 142 MMHG

## 2024-01-23 DIAGNOSIS — Z51.81 MEDICATION MONITORING ENCOUNTER: ICD-10-CM

## 2024-01-23 DIAGNOSIS — Z00.00 MEDICARE ANNUAL WELLNESS VISIT, SUBSEQUENT: Primary | ICD-10-CM

## 2024-01-23 DIAGNOSIS — M25.552 LEFT HIP PAIN: ICD-10-CM

## 2024-01-23 DIAGNOSIS — M10.00 IDIOPATHIC GOUT, UNSPECIFIED CHRONICITY, UNSPECIFIED SITE: ICD-10-CM

## 2024-01-23 DIAGNOSIS — G89.29 CHRONIC BILATERAL LOW BACK PAIN WITH RIGHT-SIDED SCIATICA: ICD-10-CM

## 2024-01-23 DIAGNOSIS — G62.9 PERIPHERAL POLYNEUROPATHY: ICD-10-CM

## 2024-01-23 DIAGNOSIS — G47.00 INSOMNIA, UNSPECIFIED TYPE: ICD-10-CM

## 2024-01-23 DIAGNOSIS — I10 ESSENTIAL HYPERTENSION: ICD-10-CM

## 2024-01-23 DIAGNOSIS — M15.9 GENERALIZED OSTEOARTHROSIS, INVOLVING MULTIPLE SITES: ICD-10-CM

## 2024-01-23 DIAGNOSIS — E78.2 MIXED HYPERLIPIDEMIA: ICD-10-CM

## 2024-01-23 DIAGNOSIS — M54.41 CHRONIC BILATERAL LOW BACK PAIN WITH RIGHT-SIDED SCIATICA: ICD-10-CM

## 2024-01-23 DIAGNOSIS — Z12.5 PROSTATE CANCER SCREENING: ICD-10-CM

## 2024-01-23 PROCEDURE — 85027 COMPLETE CBC AUTOMATED: CPT

## 2024-01-23 PROCEDURE — 80061 LIPID PANEL: CPT

## 2024-01-23 PROCEDURE — 73522 X-RAY EXAM HIPS BI 3-4 VIEWS: CPT

## 2024-01-23 PROCEDURE — 36415 COLL VENOUS BLD VENIPUNCTURE: CPT

## 2024-01-23 PROCEDURE — 80053 COMPREHEN METABOLIC PANEL: CPT

## 2024-01-23 PROCEDURE — 84550 ASSAY OF BLOOD/URIC ACID: CPT

## 2024-01-23 PROCEDURE — 1160F RVW MEDS BY RX/DR IN RCRD: CPT | Performed by: FAMILY MEDICINE

## 2024-01-23 PROCEDURE — G0439 PPPS, SUBSEQ VISIT: HCPCS | Performed by: FAMILY MEDICINE

## 2024-01-23 PROCEDURE — 1159F MED LIST DOCD IN RCRD: CPT | Performed by: FAMILY MEDICINE

## 2024-01-23 PROCEDURE — G0103 PSA SCREENING: HCPCS

## 2024-01-23 RX ORDER — HYDROCODONE BITARTRATE AND ACETAMINOPHEN 7.5; 325 MG/1; MG/1
1 TABLET ORAL EVERY 6 HOURS PRN
Qty: 90 TABLET | Refills: 0 | Status: SHIPPED | OUTPATIENT
Start: 2024-01-23

## 2024-01-23 RX ORDER — PREDNISONE 20 MG/1
20 TABLET ORAL DAILY
Qty: 30 TABLET | Refills: 2 | Status: SHIPPED | OUTPATIENT
Start: 2024-01-23

## 2024-01-23 NOTE — PROGRESS NOTES
The ABCs of the Annual Wellness Visit  Subsequent Medicare Wellness Visit    Chief Complaint   Patient presents with    Medicare Wellness-subsequent      Subjective   History of Present Illness:  Papito Salinas is a 78 y.o. male who presents for a Subsequent Medicare Wellness Visit.    Significant increase in hip pain bilaterally left hip much worse than right.  Can barely climb stairs, difficulty walking in general.  Points to left groin as primary pain area.    The following portions of the patient's history were reviewed and   updated as appropriate: allergies, current medications, past family history, past medical history, past social history, past surgical history, and problem list.    Compared to one year ago, the patient feels his physical   health is worse.    Compared to one year ago, the patient feels his mental   health is the same.    Recent Hospitalizations:  He was not admitted to the hospital during the last year.       Current Medical Providers:  Patient Care Team:  Dick Brewster MD as PCP - General    Outpatient Medications Prior to Visit   Medication Sig Dispense Refill    allopurinol (ZYLOPRIM) 300 MG tablet Take 1 tablet by mouth Daily. 90 tablet 3    amitriptyline (ELAVIL) 25 MG tablet TAKE 1 TABLET BY MOUTH EVERY DAY AT NIGHT 90 tablet 3    doxazosin (CARDURA) 4 MG tablet Take 1 tablet by mouth every night at bedtime. 90 tablet 3    gabapentin (NEURONTIN) 300 MG capsule Take 1 capsule by mouth every night at bedtime. 90 capsule 1    mesalamine (LIALDA) 1.2 g EC tablet Take 1 tablet by mouth Every 12 (Twelve) Hours.      metoprolol succinate XL (TOPROL-XL) 100 MG 24 hr tablet TAKE 1 TABLET BY MOUTH EVERY DAY 90 tablet 3    omeprazole (priLOSEC) 40 MG capsule Take 1 capsule by mouth Daily. 90 capsule 3    temazepam (RESTORIL) 15 MG capsule TAKE 1 CAPSULE BY MOUTH AT NIGHT AS NEEDED FOR SLEEP. 30 capsule 5    valsartan (DIOVAN) 160 MG tablet Take 1 tablet by mouth Daily. 90  "tablet 3    amitriptyline (ELAVIL) 10 MG tablet TAKE 1 TABLET BY MOUTH EVERY DAY AT NIGHT 90 tablet 3    HYDROcodone-acetaminophen (Norco) 7.5-325 MG per tablet Take 1 tablet by mouth Every 6 (Six) Hours As Needed for Moderate Pain. 90 tablet 0    predniSONE (DELTASONE) 20 MG tablet TAKE 1 TABLET BY MOUTH EVERY DAY 30 tablet 2     No facility-administered medications prior to visit.       Opioid medication/s are on active medication list.  and I have evaluated his active treatment plan and pain score trends (see table).  Vitals:    01/23/24 1023   PainSc:   8   PainLoc: Leg     I have reviewed the chart for potential of high risk medication and harmful drug interactions in the elderly.          Aspirin is not on active medication list.  Aspirin use is not indicated based on review of current medical condition/s. Risk of harm outweighs potential benefits.  .    Patient Active Problem List   Diagnosis    Lumbosacral spondylosis without myelopathy    Generalized osteoarthrosis, involving multiple sites    Gout    Esophageal reflux    Hypertrophy of prostate without urinary obstruction    Tobacco abuse     Advance Care Planning  ACP discussion was declined by the patient.  Has a living will at home in their safe.    Review of Systems      Objective      Vitals:    01/23/24 1023   BP: 142/80   Pulse: 65   Resp: 16   Temp: 96.5 °F (35.8 °C)   SpO2: 98%   Weight: 114 kg (252 lb 6.4 oz)   Height: 188 cm (74.02\")   PainSc:   8   PainLoc: Leg     BMI Readings from Last 1 Encounters:   01/23/24 32.39 kg/m²   BMI is above normal parameters. Recommendations include: nutrition counseling    Does the patient have evidence of cognitive impairment? No    Physical Exam    Musculoskeletal: Right hip with decreased range of motion including internal and external rotation and hip flexion.  Left hip with markedly decreased range of motion and significant pain with internal and external rotation.            HEALTH RISK " ASSESSMENT    Smoking Status:  Social History     Tobacco Use   Smoking Status Every Day    Packs/day: 0.50    Years: 50.00    Additional pack years: 0.00    Total pack years: 25.00    Types: Cigarettes   Smokeless Tobacco Former    Types: Chew     Alcohol Consumption:  Social History     Substance and Sexual Activity   Alcohol Use Yes    Comment: occasional     Fall Risk Screen:    MARII Fall Risk Assessment was completed, and patient is at LOW risk for falls.Assessment completed on:2024    Depression Screenin/23/2024    10:33 AM   PHQ-2/PHQ-9 Depression Screening   Little Interest or Pleasure in Doing Things 0-->not at all   Feeling Down, Depressed or Hopeless 0-->not at all   PHQ-9: Brief Depression Severity Measure Score 0       Health Habits and Functional and Cognitive Screenin/13/2020    10:41 AM   Functional & Cognitive Status   Do you have difficulty preparing food and eating? No   Do you have difficulty bathing yourself, getting dressed or grooming yourself? No   Do you have difficulty using the toilet? No   Do you have difficulty moving around from place to place? No   Do you have trouble with steps or getting out of a bed or a chair? No   Current Diet Well Balanced Diet   Dental Exam Not up to date   Eye Exam Not up to date   Exercise (times per week) 0 times per week   Current Exercise Activities Include None   Do you need help using the phone?  No   Are you deaf or do you have serious difficulty hearing?  No   Do you need help to go to places out of walking distance? No   Do you need help shopping? No   Do you need help preparing meals?  No   Do you need help with housework?  No   Do you need help with laundry? No   Do you need help taking your medications? No   Do you need help managing money? No   Do you ever drive or ride in a car without wearing a seat belt? No       Age-appropriate Screening Schedule:  Refer to the list below for future screening recommendations based on  patient's age, sex and/or medical conditions. Orders for these recommended tests are listed in the plan section. The patient has been provided with a written plan.    Health Maintenance   Topic Date Due    COLORECTAL CANCER SCREENING  Never done    TDAP/TD VACCINES (1 - Tdap) Never done    LUNG CANCER SCREENING  Never done    INFLUENZA VACCINE  08/01/2023    ANNUAL WELLNESS VISIT  01/18/2024    ZOSTER VACCINE (1 of 2) 01/23/2024 (Originally [birth year]10/1996)    COVID-19 Vaccine (4 - 2023-24 season) 04/13/2024 (Originally 9/1/2023)    LIPID PANEL  01/27/2024    BMI FOLLOWUP  01/23/2025    HEPATITIS C SCREENING  Completed    Pneumococcal Vaccine 65+  Completed              Assessment & Plan     CMS Preventative Services Quick Reference  Risk Factors Identified During Encounter  Immunizations Discussed/Encouraged: Shingrix  The above risks/problems have been discussed with the patient.  Follow up actions/plans if indicated are seen below in the Assessment/Plan Section.  Pertinent information has been shared with the patient in the After Visit Summary.    Diagnoses and all orders for this visit:    1. Medicare annual wellness visit, subsequent (Primary)    2. Essential hypertension  -     CBC (No Diff); Future  -     Comprehensive Metabolic Panel; Future    3. Idiopathic gout, unspecified chronicity, unspecified site  -     CBC (No Diff); Future  -     Comprehensive Metabolic Panel; Future  -     Uric Acid; Future    4. Peripheral polyneuropathy  -     CBC (No Diff); Future  -     Comprehensive Metabolic Panel; Future  -     HYDROcodone-acetaminophen (Norco) 7.5-325 MG per tablet; Take 1 tablet by mouth Every 6 (Six) Hours As Needed for Moderate Pain.  Dispense: 90 tablet; Refill: 0  -     predniSONE (DELTASONE) 20 MG tablet; Take 1 tablet by mouth Daily. Use only as needed  Dispense: 30 tablet; Refill: 2    5. Chronic bilateral low back pain with right-sided sciatica  -     HYDROcodone-acetaminophen (Norco) 7.5-325 MG per  tablet; Take 1 tablet by mouth Every 6 (Six) Hours As Needed for Moderate Pain.  Dispense: 90 tablet; Refill: 0  -     predniSONE (DELTASONE) 20 MG tablet; Take 1 tablet by mouth Daily. Use only as needed  Dispense: 30 tablet; Refill: 2    6. Insomnia, unspecified type  -     CBC (No Diff); Future  -     Comprehensive Metabolic Panel; Future    7. Generalized osteoarthrosis, involving multiple sites    8. Prostate cancer screening  -     PSA Screen; Future    9. Medication monitoring encounter  -     CBC (No Diff); Future  -     Comprehensive Metabolic Panel; Future    10. Mixed hyperlipidemia  -     Comprehensive Metabolic Panel; Future  -     Lipid Panel; Future    11. Left hip pain  -     XR Hips Bilateral With or Without Pelvis 3-4 View; Future        Follow Up:  Return in about 6 months (around 7/23/2024) for Recheck.     An After Visit Summary and PPPS were given to the patient.

## 2024-01-24 DIAGNOSIS — M16.12 PRIMARY OSTEOARTHRITIS OF LEFT HIP: ICD-10-CM

## 2024-01-24 DIAGNOSIS — M25.552 LEFT HIP PAIN: Primary | ICD-10-CM

## 2024-01-24 LAB
ALBUMIN SERPL-MCNC: 4.2 G/DL (ref 3.5–5.2)
ALBUMIN/GLOB SERPL: 1.4 G/DL
ALP SERPL-CCNC: 115 U/L (ref 39–117)
ALT SERPL W P-5'-P-CCNC: 24 U/L (ref 1–41)
ANION GAP SERPL CALCULATED.3IONS-SCNC: 12 MMOL/L (ref 5–15)
AST SERPL-CCNC: 30 U/L (ref 1–40)
BILIRUB SERPL-MCNC: 0.6 MG/DL (ref 0–1.2)
BUN SERPL-MCNC: 13 MG/DL (ref 8–23)
BUN/CREAT SERPL: 10 (ref 7–25)
CALCIUM SPEC-SCNC: 9.2 MG/DL (ref 8.6–10.5)
CHLORIDE SERPL-SCNC: 102 MMOL/L (ref 98–107)
CHOLEST SERPL-MCNC: 173 MG/DL (ref 0–200)
CO2 SERPL-SCNC: 25 MMOL/L (ref 22–29)
CREAT SERPL-MCNC: 1.3 MG/DL (ref 0.76–1.27)
DEPRECATED RDW RBC AUTO: 42.3 FL (ref 37–54)
EGFRCR SERPLBLD CKD-EPI 2021: 56.6 ML/MIN/1.73
ERYTHROCYTE [DISTWIDTH] IN BLOOD BY AUTOMATED COUNT: 13.2 % (ref 12.3–15.4)
GLOBULIN UR ELPH-MCNC: 3 GM/DL
GLUCOSE SERPL-MCNC: 91 MG/DL (ref 65–99)
HCT VFR BLD AUTO: 43.3 % (ref 37.5–51)
HDLC SERPL-MCNC: 32 MG/DL (ref 40–60)
HGB BLD-MCNC: 14.8 G/DL (ref 13–17.7)
LDLC SERPL CALC-MCNC: 110 MG/DL (ref 0–100)
LDLC/HDLC SERPL: 3.3 {RATIO}
MCH RBC QN AUTO: 30.6 PG (ref 26.6–33)
MCHC RBC AUTO-ENTMCNC: 34.2 G/DL (ref 31.5–35.7)
MCV RBC AUTO: 89.5 FL (ref 79–97)
PLATELET # BLD AUTO: 176 10*3/MM3 (ref 140–450)
PMV BLD AUTO: 10.2 FL (ref 6–12)
POTASSIUM SERPL-SCNC: 4.4 MMOL/L (ref 3.5–5.2)
PROT SERPL-MCNC: 7.2 G/DL (ref 6–8.5)
PSA SERPL-MCNC: 0.53 NG/ML (ref 0–4)
RBC # BLD AUTO: 4.84 10*6/MM3 (ref 4.14–5.8)
SODIUM SERPL-SCNC: 139 MMOL/L (ref 136–145)
TRIGL SERPL-MCNC: 177 MG/DL (ref 0–150)
URATE SERPL-MCNC: 4.5 MG/DL (ref 3.4–7)
VLDLC SERPL-MCNC: 31 MG/DL (ref 5–40)
WBC NRBC COR # BLD AUTO: 6.74 10*3/MM3 (ref 3.4–10.8)

## 2024-01-24 NOTE — PROGRESS NOTES
Notify patient that x-rays reveal arthritis in both hips worse on the left.  I have entered a referral to Dr. Durbin.

## 2024-01-26 ENCOUNTER — TELEPHONE (OUTPATIENT)
Dept: FAMILY MEDICINE CLINIC | Facility: CLINIC | Age: 79
End: 2024-01-26

## 2024-01-26 DIAGNOSIS — G62.9 PERIPHERAL POLYNEUROPATHY: ICD-10-CM

## 2024-01-26 RX ORDER — GABAPENTIN 300 MG/1
300 CAPSULE ORAL
Qty: 90 CAPSULE | Refills: 1 | Status: SHIPPED | OUTPATIENT
Start: 2024-01-26

## 2024-01-29 NOTE — TELEPHONE ENCOUNTER
"Caller: Papito Salinas \"Geoff\"    Relationship: Self    Best call back number: [phone]     Which medication are you concerned about: HYDROCODONE-ACETAMINOPHEN 7.5-325MG    Who prescribed you this medication: DR COREA    When did you start taking this medication: HAS NOT PICKED UP THIS PRESCRIPTION    What are your concerns: PATIENT WAS ADVISED BY THE PHARMACY THAT DR COREA WILL NEED TO CONTACT HIS INSURANCE TO HAVE THEM APPROVE THE PRESCRIPTION.    HE STATED THAT THIS SITUATION ALSO HAPPENED THE LAST TIME HE TRIED TO  THIS PRESCRIPTION.    PLEASE NOTIFY PATIENT WHEN AN UPDATE IS AVAILABLE OR IF THERE ARE ANY QUESTIONS/CONCERNS.     "
PA submitted.  
No

## 2024-02-05 ENCOUNTER — OFFICE VISIT (OUTPATIENT)
Dept: ORTHOPEDIC SURGERY | Facility: CLINIC | Age: 79
End: 2024-02-05
Payer: MEDICARE

## 2024-02-05 VITALS
HEIGHT: 74 IN | DIASTOLIC BLOOD PRESSURE: 98 MMHG | BODY MASS INDEX: 31.95 KG/M2 | SYSTOLIC BLOOD PRESSURE: 158 MMHG | WEIGHT: 249 LBS

## 2024-02-05 DIAGNOSIS — E66.09 CLASS 1 OBESITY DUE TO EXCESS CALORIES WITH BODY MASS INDEX (BMI) OF 31.0 TO 31.9 IN ADULT, UNSPECIFIED WHETHER SERIOUS COMORBIDITY PRESENT: ICD-10-CM

## 2024-02-05 DIAGNOSIS — M25.552 LEFT HIP PAIN: Primary | ICD-10-CM

## 2024-02-05 DIAGNOSIS — M16.12 PRIMARY OSTEOARTHRITIS OF LEFT HIP: ICD-10-CM

## 2024-02-05 PROCEDURE — 1160F RVW MEDS BY RX/DR IN RCRD: CPT | Performed by: PHYSICIAN ASSISTANT

## 2024-02-05 PROCEDURE — 99213 OFFICE O/P EST LOW 20 MIN: CPT | Performed by: PHYSICIAN ASSISTANT

## 2024-02-05 PROCEDURE — 1159F MED LIST DOCD IN RCRD: CPT | Performed by: PHYSICIAN ASSISTANT

## 2024-02-05 RX ORDER — ASPIRIN 81 MG/1
TABLET ORAL DAILY
COMMUNITY

## 2024-02-05 NOTE — PROGRESS NOTES
[facility] [place] [facility] Note    Subjective     Chief Complaint   Patient presents with    Left Hip - Pain        HPI  Papito Salinas is a 78 y.o. male.  Established patient presents for evaluation of left hip pain.  Symptoms/pain have been ongoing for couple months.  ISRRAEL: No history of injury or trauma.  Patient was referred by his PCP due to difficulty with stairs, walking.    Pain scale: 7/10.  Severity of the pain moderate.  Quality of the pain aching, throbbing.  Associated symptoms giving away/buckling, pain.  Activity related to pain walking (reports if he walks slow he does okay but it cannot be for prolonged periods of time), stairs (has to take 1 stair at a time).  Pain eased by resting but that is mostly secondary to ongoing back issues.      Denies fever, chills, night sweats or other constitutional symptoms.    Past Medical History:   Diagnosis Date    GERD (gastroesophageal reflux disease)     Hypertension     Insomnia     JEAN (obstructive sleep apnea)     Plantar fascial fibromatosis       Past Surgical History:   Procedure Laterality Date    VEIN SURGERY        Family History   Problem Relation Age of Onset    Cancer Mother     Cancer Father      Social History     Socioeconomic History    Marital status:    Tobacco Use    Smoking status: Every Day     Packs/day: 0.50     Years: 50.00     Additional pack years: 0.00     Total pack years: 25.00     Types: Cigarettes    Smokeless tobacco: Former     Types: Chew   Vaping Use    Vaping Use: Never used   Substance and Sexual Activity    Alcohol use: Yes     Comment: occasional    Drug use: No      Current Outpatient Medications on File Prior to Visit   Medication Sig Dispense Refill    allopurinol (ZYLOPRIM) 300 MG tablet Take 1 tablet by mouth Daily. 90 tablet 3    amitriptyline (ELAVIL) 25 MG tablet TAKE 1 TABLET BY MOUTH EVERY DAY AT NIGHT 90 tablet 3    aspirin 81 MG EC tablet Daily.      doxazosin (CARDURA) 4 MG tablet Take 1  "tablet by mouth every night at bedtime. 90 tablet 3    gabapentin (NEURONTIN) 300 MG capsule TAKE 1 CAPSULE BY MOUTH EVERY NIGHT AT BEDTIME. 90 capsule 1    HYDROcodone-acetaminophen (Norco) 7.5-325 MG per tablet Take 1 tablet by mouth Every 6 (Six) Hours As Needed for Moderate Pain. 90 tablet 0    mesalamine (LIALDA) 1.2 g EC tablet Take 1 tablet by mouth Every 12 (Twelve) Hours.      metoprolol succinate XL (TOPROL-XL) 100 MG 24 hr tablet TAKE 1 TABLET BY MOUTH EVERY DAY 90 tablet 3    omeprazole (priLOSEC) 40 MG capsule Take 1 capsule by mouth Daily. 90 capsule 3    predniSONE (DELTASONE) 20 MG tablet Take 1 tablet by mouth Daily. Use only as needed 30 tablet 2    temazepam (RESTORIL) 15 MG capsule TAKE 1 CAPSULE BY MOUTH AT NIGHT AS NEEDED FOR SLEEP. 30 capsule 5    valsartan (DIOVAN) 160 MG tablet Take 1 tablet by mouth Daily. 90 tablet 3     No current facility-administered medications on file prior to visit.      Allergies   Allergen Reactions    Nsaids GI Bleeding     colitis    Penicillins Other (See Comments)     Fever spiked and out for 7days        The following portions of the patient's history were reviewed and updated as appropriate: allergies, current medications, past family history, past medical history, past social history, past surgical history, and problem list.    Review of Systems   Constitutional: Negative.    HENT: Negative.     Eyes: Negative.    Respiratory: Negative.     Cardiovascular: Negative.    Gastrointestinal: Negative.    Endocrine: Negative.    Genitourinary: Negative.    Musculoskeletal:  Positive for arthralgias.   Skin: Negative.    Allergic/Immunologic: Negative.    Neurological: Negative.    Hematological: Negative.    Psychiatric/Behavioral: Negative.          Objective      Physical Exam  /98   Ht 188 cm (74.02\")   Wt 113 kg (249 lb)   BMI 31.96 kg/m²     Body mass index is 31.96 kg/m².    GENERAL APPEARANCE: awake, alert & oriented x 3, in no acute distress and " well developed, well nourished  PSYCH: normal mood and affect  LUNGS:  breathing nonlabored, no wheezing  EYES: sclera anicteric, pupils equal  CARDIOVASCULAR: palpable pulses. Capillary refill less than 2 seconds  INTEGUMENTARY: skin intact, no clubbing, cyanosis  NEUROLOGIC:  Normal Sensation          Ortho Exam  Left hip  Skin: Grossly intact without any erythema, warmth or swelling.  Tenderness: Anterior hip/flexors positive.  Groin positive.  Lateral hip/GT negative.  Low back pain positive  Motion: Flx 100°, internal rotation 10°, external rotation 20°.   Testing: Stinchfield positive, Hip flx to 90° with IR/ER mild discomfort.  Strength: Hip flx/ext/abd 5/5, Q/HS 5/5.  Motor: Grossly intact Q/HS/TA/GS/EHL/P.  Sensory: Grossly intact to LT L2-S1.      Imaging/Studies  Reviewed plain film imaging performed on 1/23/2024.    XR HIPS BILATERAL W OR WO PELVIS 3-4 VIEW     Date of Exam: 1/23/2024 11:44 AM EST     Indication: Hip pain, chronic, no prior imaging  hip pain     Comparison: None available.     Findings: Degenerative changes of both hips greater on the left. The pubic bones are intact. Degenerative changes of the lower lumbar spine. The sacroiliac joints are intact. The bony pelvis is intact.     IMPRESSION:  Degenerative changes of both hips greater on the left.     Electronically Signed: Howie Posadas MD    1/23/2024 9:21 PM EST    Workstation ID: [id number]      Assessment/Plan        ICD-10-CM ICD-9-CM   1. Left hip pain  M25.552 719.45   2. Primary osteoarthritis of left hip  M16.12 715.15   3. Class 1 obesity due to excess calories with body mass index (BMI) of 31.0 to 31.9 in adult, unspecified whether serious comorbidity present  E66.09 278.00    Z68.31 V85.31       Orders Placed This Encounter   Procedures    Ambulatory Referral to Physical Therapy Evaluate and treat, Ortho        -Left hip pain due to osteoarthritis.  -Reviewed imaging with the patient and wife.  -Discussed NAVNEET with the patient but  he is uninterested in surgical intervention at this time.  -Other treatment options discussed with patient including injection, PT.  -Patient uninterested in proceeding with diagnostic/therapeutic intra-articular hip corticosteroid injection.  -For now he would like to try PT--formal referral placed.  -Patient to continue with current pain management regimen which includes use of Norco, gabapentin--predominantly for his ongoing low back pain with sciatica.  -Obesity--patient with a BMI of 31.96.  Encouraged to work on dieting, portion control and calorie restriction to help with weight loss.  -Patient wants to follow-up in 1 month with Dr. Durbin for consideration of NAVNEET if current modalities are not helping.  -Questions and concerns answered.      Medical Decision Making  Management Options : prescription/IM medicine and physical/occupational therapy  Data/Risk: radiology tests      Raina Liang PA-C  02/05/24  16:35 EST               EMR Dragon/Transcription disclaimer:  Much of this encounter note is an electronic transcription of spoken language to printed text. Electronic transcription of spoken language may permit erroneous, or at times, nonsensical words or phrases to be inadvertently transcribed. Although I have reviewed the note for such errors, some may still exist.

## 2024-02-16 ENCOUNTER — OFFICE VISIT (OUTPATIENT)
Dept: FAMILY MEDICINE CLINIC | Facility: CLINIC | Age: 79
End: 2024-02-16
Payer: MEDICARE

## 2024-02-16 VITALS
TEMPERATURE: 98.2 F | BODY MASS INDEX: 32.8 KG/M2 | DIASTOLIC BLOOD PRESSURE: 82 MMHG | WEIGHT: 255.6 LBS | HEIGHT: 74 IN | SYSTOLIC BLOOD PRESSURE: 136 MMHG | OXYGEN SATURATION: 94 % | HEART RATE: 62 BPM

## 2024-02-16 DIAGNOSIS — R05.9 COUGH, UNSPECIFIED TYPE: ICD-10-CM

## 2024-02-16 DIAGNOSIS — U07.1 COVID-19: Primary | ICD-10-CM

## 2024-02-16 DIAGNOSIS — R50.9 FEVER, UNSPECIFIED FEVER CAUSE: ICD-10-CM

## 2024-02-16 LAB
EXPIRATION DATE: ABNORMAL
EXPIRATION DATE: NORMAL
FLUAV AG UPPER RESP QL IA.RAPID: NOT DETECTED
FLUBV AG UPPER RESP QL IA.RAPID: NOT DETECTED
INTERNAL CONTROL: ABNORMAL
INTERNAL CONTROL: NORMAL
Lab: 5710
Lab: ABNORMAL
RSV AG SPEC QL: NORMAL
SARS-COV-2 AG UPPER RESP QL IA.RAPID: DETECTED

## 2024-02-16 PROCEDURE — 1160F RVW MEDS BY RX/DR IN RCRD: CPT | Performed by: PHYSICIAN ASSISTANT

## 2024-02-16 PROCEDURE — 1159F MED LIST DOCD IN RCRD: CPT | Performed by: PHYSICIAN ASSISTANT

## 2024-02-16 PROCEDURE — 99213 OFFICE O/P EST LOW 20 MIN: CPT | Performed by: PHYSICIAN ASSISTANT

## 2024-02-16 PROCEDURE — 87807 RSV ASSAY W/OPTIC: CPT | Performed by: PHYSICIAN ASSISTANT

## 2024-02-16 PROCEDURE — 87428 SARSCOV & INF VIR A&B AG IA: CPT | Performed by: PHYSICIAN ASSISTANT

## 2024-02-16 RX ORDER — AZITHROMYCIN 250 MG/1
TABLET, FILM COATED ORAL
Qty: 6 TABLET | Refills: 0 | Status: SHIPPED | OUTPATIENT
Start: 2024-02-16

## 2024-02-16 NOTE — PROGRESS NOTES
Subjective   Papito Salinas is a 78 y.o. male  Cough (Productive cough with some colored mucus, taking OTC medicatiions Coricidin and Tylenol) and Sinus Problem (Sinus congestion and intermittent fever)      Cough  Associated symptoms include a fever, postnasal drip, rhinorrhea and a sore throat.   Sinus Problem  Associated symptoms include congestion, coughing, sneezing and a sore throat. Pertinent negatives include no sinus pressure.       The following portions of the patient's history were reviewed and updated as appropriate: allergies, current medications, past social history and problem list    Review of Systems   Constitutional:  Positive for fever.   HENT:  Positive for congestion, postnasal drip, rhinorrhea, sneezing, sore throat and voice change. Negative for sinus pressure.    Respiratory:  Positive for cough.    Neurological:  Positive for weakness.       Objective     Vitals:    02/16/24 1408   BP: 136/82   Pulse: 62   Temp: 98.2 °F (36.8 °C)   SpO2: 94%       Physical Exam  Vitals and nursing note reviewed.   Constitutional:       General: He is not in acute distress.     Appearance: Normal appearance. He is well-developed. He is not ill-appearing, toxic-appearing or diaphoretic.   HENT:      Head: Normocephalic and atraumatic.      Right Ear: External ear normal.      Left Ear: External ear normal.      Mouth/Throat:      Pharynx: Posterior oropharyngeal erythema present. No oropharyngeal exudate.   Eyes:      General:         Right eye: No discharge.         Left eye: No discharge.      Conjunctiva/sclera: Conjunctivae normal.   Cardiovascular:      Rate and Rhythm: Normal rate and regular rhythm.      Heart sounds: Normal heart sounds.   Pulmonary:      Effort: Pulmonary effort is normal. No respiratory distress.      Breath sounds: Normal breath sounds.   Musculoskeletal:      Cervical back: Normal range of motion and neck supple.   Lymphadenopathy:      Cervical: No cervical adenopathy.    Skin:     General: Skin is warm and dry.   Neurological:      Mental Status: He is alert and oriented to person, place, and time.         Assessment & Plan     Diagnoses and all orders for this visit:    1. COVID-19 (Primary)    2. Cough, unspecified type  -     POCT SARS-CoV-2 Antigen FARA + Flu  -     POCT RSV    3. Fever, unspecified fever cause  -     POCT SARS-CoV-2 Antigen FARA + Flu  -     POCT RSV    Other orders  -     azithromycin (Zithromax Z-Buddy) 250 MG tablet; Take 2 tablets by mouth on day 1, then 1 tablet daily on days 2-5  Dispense: 6 tablet; Refill: 0

## 2024-03-06 ENCOUNTER — OFFICE VISIT (OUTPATIENT)
Dept: ORTHOPEDIC SURGERY | Facility: CLINIC | Age: 79
End: 2024-03-06
Payer: MEDICARE

## 2024-03-06 VITALS
WEIGHT: 255.73 LBS | SYSTOLIC BLOOD PRESSURE: 150 MMHG | DIASTOLIC BLOOD PRESSURE: 78 MMHG | BODY MASS INDEX: 32.82 KG/M2 | HEIGHT: 74 IN

## 2024-03-06 DIAGNOSIS — M16.12 PRIMARY OSTEOARTHRITIS OF LEFT HIP: Primary | ICD-10-CM

## 2024-03-06 PROBLEM — M16.9 DEGENERATIVE ARTHRITIS OF HIP: Status: ACTIVE | Noted: 2024-03-06

## 2024-03-06 PROCEDURE — 99214 OFFICE O/P EST MOD 30 MIN: CPT | Performed by: ORTHOPAEDIC SURGERY

## 2024-03-06 PROCEDURE — 1160F RVW MEDS BY RX/DR IN RCRD: CPT | Performed by: ORTHOPAEDIC SURGERY

## 2024-03-06 PROCEDURE — 1159F MED LIST DOCD IN RCRD: CPT | Performed by: ORTHOPAEDIC SURGERY

## 2024-03-06 RX ORDER — PREGABALIN 150 MG/1
150 CAPSULE ORAL ONCE
OUTPATIENT
Start: 2024-03-06 | End: 2024-03-06

## 2024-03-06 RX ORDER — ACETAMINOPHEN 325 MG/1
1000 TABLET ORAL ONCE
OUTPATIENT
Start: 2024-03-06 | End: 2024-03-06

## 2024-03-06 RX ORDER — CHLORHEXIDINE GLUCONATE 40 MG/ML
SOLUTION TOPICAL
Qty: 237 ML | Refills: 0 | Status: SHIPPED | OUTPATIENT
Start: 2024-03-06

## 2024-03-06 NOTE — PROGRESS NOTES
[facility] [place] [facility] Clinic Note    Subjective     Chief Complaint   Patient presents with    Follow-up     4 week follow up --Left hip pain            HPI    It has been 4  week(s) since Mr. Salinas's last visit. He returns to clinic today for follow-up of left hip pain. The issue has been ongoing for 3 month(s). He rates his pain a 8/10 on the pain scale. Previous/current treatments: NSAIDS. Current symptoms: pain. The pain is worse with walking, climbing stairs, and lying on affected side; resting improve the pain. Overall, he is doing the same.  Pain located in the groin region and anterior thigh.  He does have a history of an old femur fracture at age 19, which healed.  Pain has worsened to the point where he would like to consider hip replacement surgery.  His wife is able to help out postoperatively.  No diabetes.  No heart disease.  He takes a baby aspirin per day.  No history of clots or clotting disorders.  No blood thinners.    I have reviewed the following portions of the patient's history and agree with: History of Present Illness and Review of Systems    Patient Active Problem List   Diagnosis    Lumbosacral spondylosis without myelopathy    Generalized osteoarthrosis, involving multiple sites    Gout    Esophageal reflux    Hypertrophy of prostate without urinary obstruction    Tobacco abuse    Degenerative arthritis of hip     Past Medical History:   Diagnosis Date    GERD (gastroesophageal reflux disease)     Hypertension     Insomnia     JEAN (obstructive sleep apnea)     Plantar fascial fibromatosis       Past Surgical History:   Procedure Laterality Date    VEIN SURGERY        Family History   Problem Relation Age of Onset    Cancer Mother     Cancer Father      Social History     Socioeconomic History    Marital status:    Tobacco Use    Smoking status: Every Day     Current packs/day: 0.50     Average packs/day: 0.5 packs/day for 50.0 years (25.0 ttl pk-yrs)     Types: Cigarettes     Smokeless tobacco: Former     Types: Chew   Vaping Use    Vaping status: Never Used   Substance and Sexual Activity    Alcohol use: Yes     Comment: occasional    Drug use: No      Current Outpatient Medications on File Prior to Visit   Medication Sig Dispense Refill    allopurinol (ZYLOPRIM) 300 MG tablet Take 1 tablet by mouth Daily. 90 tablet 3    amitriptyline (ELAVIL) 25 MG tablet TAKE 1 TABLET BY MOUTH EVERY DAY AT NIGHT 90 tablet 3    aspirin 81 MG EC tablet Daily.      azithromycin (Zithromax Z-Buddy) 250 MG tablet Take 2 tablets by mouth on day 1, then 1 tablet daily on days 2-5 6 tablet 0    doxazosin (CARDURA) 4 MG tablet Take 1 tablet by mouth every night at bedtime. 90 tablet 3    gabapentin (NEURONTIN) 300 MG capsule TAKE 1 CAPSULE BY MOUTH EVERY NIGHT AT BEDTIME. 90 capsule 1    HYDROcodone-acetaminophen (Norco) 7.5-325 MG per tablet Take 1 tablet by mouth Every 6 (Six) Hours As Needed for Moderate Pain. 90 tablet 0    mesalamine (LIALDA) 1.2 g EC tablet Take 1 tablet by mouth Every 12 (Twelve) Hours.      metoprolol succinate XL (TOPROL-XL) 100 MG 24 hr tablet TAKE 1 TABLET BY MOUTH EVERY DAY 90 tablet 3    omeprazole (priLOSEC) 40 MG capsule Take 1 capsule by mouth Daily. 90 capsule 3    predniSONE (DELTASONE) 20 MG tablet Take 1 tablet by mouth Daily. Use only as needed 30 tablet 2    temazepam (RESTORIL) 15 MG capsule TAKE 1 CAPSULE BY MOUTH AT NIGHT AS NEEDED FOR SLEEP. 30 capsule 5    valsartan (DIOVAN) 160 MG tablet Take 1 tablet by mouth Daily. 90 tablet 3     No current facility-administered medications on file prior to visit.      Allergies   Allergen Reactions    Nsaids GI Bleeding     colitis    Penicillins Other (See Comments)     Fever spiked and out for 7days        Review of Systems   Constitutional:  Negative for activity change, appetite change, chills, diaphoresis, fatigue, fever and unexpected weight change.   HENT:  Negative for congestion, dental problem, drooling, ear  "discharge, ear pain, facial swelling, hearing loss, mouth sores, nosebleeds, postnasal drip, rhinorrhea, sinus pressure, sneezing, sore throat, tinnitus, trouble swallowing and voice change.    Eyes:  Negative for photophobia, pain, discharge, redness, itching and visual disturbance.   Respiratory:  Negative for apnea, cough, choking, chest tightness, shortness of breath, wheezing and stridor.    Cardiovascular:  Negative for chest pain, palpitations and leg swelling.   Gastrointestinal:  Negative for abdominal distention, abdominal pain, anal bleeding, blood in stool, constipation, diarrhea, nausea, rectal pain and vomiting.   Endocrine: Negative for cold intolerance, heat intolerance, polydipsia, polyphagia and polyuria.   Genitourinary:  Negative for decreased urine volume, difficulty urinating, dysuria, enuresis, flank pain, frequency, genital sores, hematuria and urgency.   Musculoskeletal:  Positive for arthralgias. Negative for back pain, gait problem, joint swelling, myalgias, neck pain and neck stiffness.   Skin:  Negative for color change, pallor, rash and wound.   Allergic/Immunologic: Negative for environmental allergies, food allergies and immunocompromised state.   Neurological:  Negative for dizziness, tremors, seizures, syncope, facial asymmetry, speech difficulty, weakness, light-headedness, numbness and headaches.   Hematological:  Negative for adenopathy. Does not bruise/bleed easily.   Psychiatric/Behavioral:  Negative for agitation, behavioral problems, confusion, decreased concentration, dysphoric mood, hallucinations, self-injury, sleep disturbance and suicidal ideas. The patient is not nervous/anxious and is not hyperactive.         Objective      Physical Exam  /78   Ht 188 cm (74\")   Wt 116 kg (255 lb 11.7 oz)   BMI 32.83 kg/m²     Body mass index is 32.83 kg/m².    General:   Mental Status:  Alert   Appearance: Cooperative, in no acute distress   Build and Nutrition: Overweight by " BMI male   Orientation: Alert and oriented to person, place and time   Posture: Normal   Gait: Limp on the left    Integument:   Left hip: No skin lesions, no rash, no ecchymosis    Neurologic:   Motor:  Left lower extremity: 5/5 quadriceps, hamstrings, ankle dorsiflexors, and ankle plantar flexors  Vascular:   Left lower extremity: Pitting edema    Lower Extremity:   Left Hip:    Tenderness:  None    Swelling:  None    Crepitus:  None    Atrophy:  None    Range of motion:  External Rotation: 30°       Internal Rotation: 20°, with pain       Flexion:  100°       Extension:  0°   Instability:  None  Deformities:  None  [facility]    No leg length discrepancy        Imaging/Studies  Imaging Results (Last 24 Hours)       Procedure Component Value Units Date/Time    XR Hip With or Without Pelvis 2 - 3 View Left [[id number]] Resulted: 03/06/24 1155     Updated: 03/06/24 1156    Narrative:      Left Hip Radiographs  Indication: left hip pain  Views: low AP pelvis and lateral of the left hip    Comparison: 1/23/2024    Findings:   Bone-on-bone contact, acetabular osteophytes, femoral head neck junction   osteophytes, no acute bony abnormalities.  No unusual bony features.    Advanced hip arthritis.      XR Femur 2 View Left [[id number]] Resulted: 03/06/24 1154     Updated: 03/06/24 1155    Narrative:      Left Femur Radiographs  Indication: pain  Views: AP and lateral views of the left femur    Comparison: no prior studies available for review    Findings:  There is a healed midshaft femur fracture in good alignment.  Degenerative   changes are seen in the knee and hip.                Assessment and Plan     Diagnoses and all orders for this visit:    1. Primary osteoarthritis of left hip (Primary)  -     XR Hip With or Without Pelvis 2 - 3 View Left  -     XR Femur 2 View Left  -     Case Request; Standing  -     Instructions on coughing, deep breathing, and incentive spirometry.; Future  -      CBC and Differential; Future  -     Basic metabolic panel; Future  -     Protime-INR; Future  -     APTT; Future  -     Hemoglobin A1c; Future  -     Sedimentation rate; Future  -     C-reactive protein; Future  -     Tranexamic Acid 1,000 mg in sodium chloride 0.9 % 100 mL  -     Tranexamic Acid 1,000 mg in sodium chloride 0.9 % 100 mL  -     ethyl alcohol 62 % 2 each  -     ceFAZolin (ANCEF) 2 g in sodium chloride 0.9 % 100 mL IVPB  -     acetaminophen (TYLENOL) tablet 975 mg  -     pregabalin (LYRICA) capsule 150 mg  -     Case Request    Other orders  -     Outpatient In A Bed; Standing  -     Follow Anesthesia Guidelines / Protocol; Future  -     Follow Anesthesia Guidelines / Protocol; Standing  -     Verify NPO Status; Standing  -     Verify The Time Patient Completed ERAS Hydration Drink; Standing  -     SCD (sequential compression device)- to be placed on patient in Pre-op; Standing  -     Clip operative site; Standing  -     Obtain informed consent (if not collected inpatient or PAT); Standing  -     Obtain informed consent  -     Provide instructions to patient regarding NPO status  -     Chlorhexidine Skin Prep - Educate and Review With Patient; Future  -     Provide Patient With ERAS Hydration Instructions  -     Provide Patient With Enhanced Recovery Booklet(s) or Handout  -     Provide Instructions/Handout For Benzoyl Peroxide 5% Wash If Having Shoulder/Arm Surgery (If Prescribed)  -     Provide Instructions/Handout For Bactroban And Chlorhexidine Shower (If Prescribed)  -     Perform A Memory Screening On All Hip/Knee Replacement Patients >Or Equal To 65 Years Or Older  -     Complete A PROMIS And HOOS Or KOOS Survey If Having Hip Or Knee Replacement  -     Provide Patient With Carbo Loading Instructions  -     Provide Patient With ERAS Booklet(s)/Handout  -     chlorhexidine (HIBICLENS) 4 % external liquid; Apply  topically to the appropriate area as directed Daily. Shower with hibiclens solution as  directed for 5 days prior to surgery  Dispense: 236 mL; Refill: 0        1. Primary osteoarthritis of left hip        I reviewed my findings with the patient.  He does have advanced left hip arthritis, we discussed options today.  He would like to proceed with left total hip arthroplasty surgery, understanding the risks, benefits, alternatives.  Please see my counseling note for details.    Surgical Counseling     I have informed the patient of the diagnosis and the prognosis.  Exhaustive conservative treatment modalities have not resulted in long term pain relief.  The symptoms have progressed to the point of daily pain and inability to perform activities of daily living without significant pain.  The patient has reached the point of desiring to proceed with total hip arthroplasty after discussing the risks, benefits and alternatives to the procedure.  The surgical procedure itself was discussed in detail.  Risks of the procedure were discussed, which included but are not limited to, bleeding, infection, damage to blood vessels and nerves, incomplete pain relief, loosening of the prosthesis (early or late), deep infection (early or late), need for further surgery, leg length discrepancy, hip dislocation, loss of limb, deep venous thrombosis, pulmonary embolus, death, heart attack, stroke, kidney failure, liver failure, and anesthetic complications.  In addition, the potential for deep infection developing in the future was discussed, which could require further surgery.  The hip would have to be re-opened, debrided, and potentially remove the prosthesis, which may or may not be replaced in the future.  Also, the possibility for loosening of the prosthesis has been mentioned.  If the prosthesis loosened, a revision arthroplasty could be performed, with results that are not as predictable compared to the original procedure.  The typical rehabilitative course has also been discussed, and full recovery may take up to a  year to see the maximum benefit.  The importance of patient cooperation in the rehabilitative efforts has also been discussed.  No guarantees were given.  The patient understands the potential risks versus the benefits and desires to proceed with total hip arthroplasty at a mutually convenient time.     Return for surgery.      Sumanth Durbin MD  03/06/24  12:26 EST    Dictated Utilizing Dragon Dictation

## 2024-03-07 DIAGNOSIS — M16.12 PRIMARY OSTEOARTHRITIS OF LEFT HIP: Primary | ICD-10-CM

## 2024-04-08 DIAGNOSIS — M54.41 CHRONIC BILATERAL LOW BACK PAIN WITH RIGHT-SIDED SCIATICA: ICD-10-CM

## 2024-04-08 DIAGNOSIS — G89.29 CHRONIC BILATERAL LOW BACK PAIN WITH RIGHT-SIDED SCIATICA: ICD-10-CM

## 2024-04-08 DIAGNOSIS — G62.9 PERIPHERAL POLYNEUROPATHY: ICD-10-CM

## 2024-04-08 RX ORDER — HYDROCODONE BITARTRATE AND ACETAMINOPHEN 7.5; 325 MG/1; MG/1
1 TABLET ORAL EVERY 6 HOURS PRN
Qty: 90 TABLET | Refills: 0 | Status: SHIPPED | OUTPATIENT
Start: 2024-04-08

## 2024-04-08 NOTE — TELEPHONE ENCOUNTER
"Caller: Papito Salinas \"Geoff\"    Relationship: Self    Best call back number: [phone]     Requested Prescriptions:   Requested Prescriptions     Pending Prescriptions Disp Refills    HYDROcodone-acetaminophen (Norco) 7.5-325 MG per tablet 90 tablet 0     Sig: Take 1 tablet by mouth Every 6 (Six) Hours As Needed for Moderate Pain.        Pharmacy where request should be sent: [id number] - [place], [place] - [address] - [phone]  - [phone] FX     Last office visit with prescribing clinician: 1/23/2024   Last telemedicine visit with prescribing clinician: Visit date not found   Next office visit with prescribing clinician: 7/23/2024     Additional details provided by patient: THE PHARMACY WAS OUT OF THIS IN JANUARY. NOW THEY HAVE IT. THEY TOLD HIM THE DR WILL HAVE TO CALL IT IN    Does the patient have less than a 3 day supply:  [x] Yes  [] No    Would you like a call back once the refill request has been completed: [] Yes [x] No    If the office needs to give you a call back, can they leave a voicemail: [] Yes [x] No    Robert Jones, PCT   04/08/24 09:29 EDT   "

## 2024-04-09 ENCOUNTER — PRE-ADMISSION TESTING (OUTPATIENT)
Dept: PREADMISSION TESTING | Facility: HOSPITAL | Age: 79
End: 2024-04-09
Payer: MEDICARE

## 2024-04-09 VITALS — WEIGHT: 243.83 LBS | HEIGHT: 73 IN | BODY MASS INDEX: 32.32 KG/M2

## 2024-04-09 DIAGNOSIS — M16.12 PRIMARY OSTEOARTHRITIS OF LEFT HIP: ICD-10-CM

## 2024-04-09 LAB
ANION GAP SERPL CALCULATED.3IONS-SCNC: 8 MMOL/L (ref 5–15)
APTT PPP: 35.3 SECONDS (ref 22–39)
BASOPHILS # BLD AUTO: 0.03 10*3/MM3 (ref 0–0.2)
BASOPHILS NFR BLD AUTO: 0.5 % (ref 0–1.5)
BUN SERPL-MCNC: 11 MG/DL (ref 8–23)
BUN/CREAT SERPL: 10.3 (ref 7–25)
CALCIUM SPEC-SCNC: 9.2 MG/DL (ref 8.6–10.5)
CHLORIDE SERPL-SCNC: 102 MMOL/L (ref 98–107)
CO2 SERPL-SCNC: 29 MMOL/L (ref 22–29)
CREAT SERPL-MCNC: 1.07 MG/DL (ref 0.76–1.27)
CRP SERPL-MCNC: 1.01 MG/DL (ref 0–0.5)
DEPRECATED RDW RBC AUTO: 48.6 FL (ref 37–54)
EGFRCR SERPLBLD CKD-EPI 2021: 71.5 ML/MIN/1.73
EOSINOPHIL # BLD AUTO: 0.29 10*3/MM3 (ref 0–0.4)
EOSINOPHIL NFR BLD AUTO: 5.1 % (ref 0.3–6.2)
ERYTHROCYTE [DISTWIDTH] IN BLOOD BY AUTOMATED COUNT: 14 % (ref 12.3–15.4)
ERYTHROCYTE [SEDIMENTATION RATE] IN BLOOD: 65 MM/HR (ref 0–20)
GLUCOSE SERPL-MCNC: 96 MG/DL (ref 65–99)
HBA1C MFR BLD: 5 % (ref 4.8–5.6)
HCT VFR BLD AUTO: 45.3 % (ref 37.5–51)
HGB BLD-MCNC: 14.5 G/DL (ref 13–17.7)
IMM GRANULOCYTES # BLD AUTO: 0.01 10*3/MM3 (ref 0–0.05)
IMM GRANULOCYTES NFR BLD AUTO: 0.2 % (ref 0–0.5)
INR PPP: 1.18 (ref 0.89–1.12)
LYMPHOCYTES # BLD AUTO: 1.11 10*3/MM3 (ref 0.7–3.1)
LYMPHOCYTES NFR BLD AUTO: 19.7 % (ref 19.6–45.3)
MCH RBC QN AUTO: 30 PG (ref 26.6–33)
MCHC RBC AUTO-ENTMCNC: 32 G/DL (ref 31.5–35.7)
MCV RBC AUTO: 93.8 FL (ref 79–97)
MONOCYTES # BLD AUTO: 0.41 10*3/MM3 (ref 0.1–0.9)
MONOCYTES NFR BLD AUTO: 7.3 % (ref 5–12)
NEUTROPHILS NFR BLD AUTO: 3.79 10*3/MM3 (ref 1.7–7)
NEUTROPHILS NFR BLD AUTO: 67.2 % (ref 42.7–76)
NRBC BLD AUTO-RTO: 0 /100 WBC (ref 0–0.2)
PLATELET # BLD AUTO: 199 10*3/MM3 (ref 140–450)
PMV BLD AUTO: 10.1 FL (ref 6–12)
POTASSIUM SERPL-SCNC: 4.3 MMOL/L (ref 3.5–5.2)
PROTHROMBIN TIME: 15.1 SECONDS (ref 12.2–14.5)
RBC # BLD AUTO: 4.83 10*6/MM3 (ref 4.14–5.8)
SODIUM SERPL-SCNC: 139 MMOL/L (ref 136–145)
WBC NRBC COR # BLD AUTO: 5.64 10*3/MM3 (ref 3.4–10.8)

## 2024-04-09 PROCEDURE — 85730 THROMBOPLASTIN TIME PARTIAL: CPT

## 2024-04-09 PROCEDURE — 86140 C-REACTIVE PROTEIN: CPT

## 2024-04-09 PROCEDURE — 85610 PROTHROMBIN TIME: CPT

## 2024-04-09 PROCEDURE — 80048 BASIC METABOLIC PNL TOTAL CA: CPT

## 2024-04-09 PROCEDURE — 83036 HEMOGLOBIN GLYCOSYLATED A1C: CPT

## 2024-04-09 PROCEDURE — 85025 COMPLETE CBC W/AUTO DIFF WBC: CPT

## 2024-04-09 PROCEDURE — 85652 RBC SED RATE AUTOMATED: CPT

## 2024-04-09 PROCEDURE — 36415 COLL VENOUS BLD VENIPUNCTURE: CPT

## 2024-04-09 NOTE — PAT
An arrival time for procedure was not provided during PAT visit. If patient had any questions or concerns about their arrival time, they were instructed to contact their surgeon/physician.  Additionally, if the patient referred to an arrival time that was acquired from their my chart account, patient was encouraged to verify that time with their surgeon/physician. Arrival times are NOT provided in Pre Admission Testing Department.    Patient viewed general PAT education video as instructed in their preoperative information received from their surgeon.  Patient stated the general PAT education video was viewed in its entirety and survey completed.  Copies of PAT general education handouts (Incentive Spirometry, Meds to Beds Program, Patient Belongings, Pre-op skin preparation instructions, Blood Glucose testing, Visitor policy, Surgery FAQ, Code H) distributed to patient if not printed. Education related to the PAT pass and skin preparation for surgery (if applicable) completed in PAT as a reinforcement to PAT education video. Patient instructed to return PAT pass provided today as well as completed skin preparation sheet (if applicable) on the day of procedure.     Additionally if patient had not viewed video yet but intended to view it at home or in our waiting area, then referred them to the handout with QR code/link provided during PAT visit.  Instructed patient to complete survey after viewing the video in its entirety.  Encouraged patient/family to read PAT general education handouts thoroughly and notify PAT staff with any questions or concerns. Patient verbalized understanding of all information and priority content.    Patient denies any current skin issues.     Per Anesthesia Request, patient instructed not to take their ACE/ARB medications on the AM of surgery.    Patient instructed to drink 20 ounces of Gatorade or Gatorlyte (if diabetic) and it needs to be completed 1 hour (for Main OR patients) or 2  hours (scheduled  section & [facility] patients) before given arrival time for procedure (NO RED Gatorade and NO Gatorade Zero).    Patient verbalized understanding.    Patient to apply Chlorhexadine wipes  to surgical area (as instructed) the night before procedure and the AM of procedure. Wipes provided.    Discussed with patient options for receiving total joint replacement education and assessed patient's ability and preference. Joint Replacement Guide given to patient during PAT visit since not received a copy within the last year. Encouraged patient/family to read guide thoroughly and notify PAT staff with any questions or concerns. Handout provided directing patient to links to watch online videos related to joint replacement surgery on the [facility] website. The handout gives detailed instructions for joining an online joint replacement class through Zoom or phone conference offered on . Patient agreed to participate by watching videos online. Patient verbalized understanding of instructions and to complete the online learning tool survey. Encouraged to share information with family and/or . An overview of the joint replacement education was provided during the visit including general perioperative instructions that are routine for all surgical patients (PAT PASS, wipes, directions to pre-op, etc.).    Prescription for Chlorhexidine shower called into patient's pharmacy or [facility] pharmacy by patient's surgeon.  Reinforced with patient to  the prescription from applicable pharmacy if they haven't already.  Verbal and written instructions given regarding proper use of Chlorhexidine body wash to patient and/or famlily during PAT visit. Patient/family also instructed to complete checklist and return it to Pre-op on the day of surgery.  Patient and/or family verbalized understanding.    Post-Surgery Information Instruction Sheet given to patient during Pre-Admission Testing Visit with  verbal instructions to patient to return with PAT PASS on the day of surgery. Additionally, encouraged patient to review the information provided.

## 2024-04-15 ENCOUNTER — TRANSITIONAL CARE MANAGEMENT TELEPHONE ENCOUNTER (OUTPATIENT)
Dept: ORTHOPEDIC SURGERY | Facility: CLINIC | Age: 79
End: 2024-04-15
Payer: MEDICARE

## 2024-04-15 NOTE — OUTREACH NOTE
CALLED TO INITIATE PRE-OP ASSESSMENT AND CARE PLAN. RECEIVED NO ANSWER. LEFT VOICEMAIL FOR THE PATIENT TO RETURN MY CALL.

## 2024-04-15 NOTE — OUTREACH NOTE
Pre-Operative Orthopedic Assessment      Patient: Papito Salinas    YOB: 1945      Age/Gender: 78 y.o. male  Medical Record Number: [id number]  Surgical Procedure Planned:   LT NAVNEET  Surgeon: MARSHA  Date of Surgery Planned: 04/23/2024    Question Value Score    Patient Score   1. What is your age group? 50-65 years  66-75 years  >75 years =2  =1  =0 []2  []1  [x]0   2. Gender? Male  Female =2  =1 [x]2  []1   3. How far on average can you walk? (a block is 200 meters) Two blocks or more (+\- rest)  1-2 blocks (+\- rest)  Housebound (most of time) =2  =1  =0 [x]2  []1  []0   4. Which gait aid to you use? (more often than not) None  Single-Point Stick  Crutches/Frame =2  =1  =0 [x]2  []1  []0   5. Do you use community  supports? (home help, meals on wheels, district nursing) None or one per week  Two or more per week =1  =0 [x]1  []0   6. Will you live with someone who can care for you after your operation? Yes  No =3  =0 [x]3  []0      Your Score (out of 12)  10     Key Destination at Discharge from acute care predicted by score   Scores < 6  -- extended inpatient rehabilitation   Scores 6-9 -- additional intervention to discharge directly home (Rehabilitation in home)   Scores > 9 -- directly home         Discharge Disposition/Planning:     Patient Response   Discussed the Predicted discharge disposition needed based on RAPT Assessment with the patient.    [x]YES []NO   Patient selected discharge disposition:      home   Outpatient Physical Therapy Location & Date: [facility]   Home Health Services Preferred: NONE   NONE    Post-Operative Care Giver Name: WIFE - MARC WIFE - MARC   Type of residence: [x] Private Residence  [] Assisted Living   [] Nursing Home          [] Group Home  [] Homeless    Living Arrangements: [] Alone   [x] Children  [] Parent  [] Friend  [] Family Member   [] Significant Other  [x] Spouse      Subacute Inpatient  Rehabilitation:  Complete this section only if planning inpatient services at a Subacute Facility     Patient Response   Subacute Facility Preferred (Please list 2 facilities:      Requires pre-certification for inpatient rehabilitation services?         Planned source of transportation to inpatient rehabilitation facility?       If choosing inpatient services at an Acute or Subacute Facility please list a subsequent back-up plan (in case patient fails to qualify for inpatient rehabilitation). Back-up plans should include caregiver (family member or friend) for first 24-48 post- -operatively.       Home Equipment Patient Response   Does patient have a walker for home use?    [x]YES []NO   Does patient have a shower chair for home use?    [x]YES []NO   Does patient have an elevated commode seat for home use? [x]YES []NO   Does patient have a cane for home use?    []YES [x]NO   Is there any other medical equipment in the home? If so,  List in comment section below []YES [x]NO   Pre-Operative Class & Clearances Patient Response   Cardiac Clearance []     Pulmonology Clearance []     Dental Clearance []  Medical Clearance []     Other Clearance [] ________________________________    Attended or scheduled to attend the pre-operative class within 1 year of total joint replacement? [x]YES []NO   Patient Education  Completed   Expected time of discharge discussed [x]YES []NO   Encouraged to attend Pre-Operative Class    [x]YES []NO   Education re: Predicted Discharge Disposition based on RAPT score    [x]YES []NO   Patient receptive and voiced understanding of information given    [x]YES []NO                                                                                                            Comments:                                           Signature: Homero Weathers Rep    Date:  4/15/2024

## 2024-04-22 ENCOUNTER — ANESTHESIA EVENT (OUTPATIENT)
Dept: PERIOP | Facility: HOSPITAL | Age: 79
End: 2024-04-22
Payer: MEDICARE

## 2024-04-22 RX ORDER — SODIUM CHLORIDE 0.9 % (FLUSH) 0.9 %
10 SYRINGE (ML) INJECTION AS NEEDED
Status: CANCELLED | OUTPATIENT
Start: 2024-04-22

## 2024-04-22 RX ORDER — SODIUM CHLORIDE 9 MG/ML
40 INJECTION, SOLUTION INTRAVENOUS AS NEEDED
Status: CANCELLED | OUTPATIENT
Start: 2024-04-22

## 2024-04-22 RX ORDER — SODIUM CHLORIDE 0.9 % (FLUSH) 0.9 %
10 SYRINGE (ML) INJECTION EVERY 12 HOURS SCHEDULED
Status: CANCELLED | OUTPATIENT
Start: 2024-04-22

## 2024-04-23 ENCOUNTER — APPOINTMENT (OUTPATIENT)
Dept: GENERAL RADIOLOGY | Facility: HOSPITAL | Age: 79
End: 2024-04-23
Payer: MEDICARE

## 2024-04-23 ENCOUNTER — ANESTHESIA (OUTPATIENT)
Dept: PERIOP | Facility: HOSPITAL | Age: 79
End: 2024-04-23
Payer: MEDICARE

## 2024-04-23 ENCOUNTER — HOSPITAL ENCOUNTER (OUTPATIENT)
Facility: HOSPITAL | Age: 79
Discharge: HOME | End: 2024-04-23
Attending: ORTHOPAEDIC SURGERY | Admitting: ORTHOPAEDIC SURGERY
Payer: MEDICARE

## 2024-04-23 VITALS
TEMPERATURE: 97.6 F | BODY MASS INDEX: 32.2 KG/M2 | WEIGHT: 243 LBS | HEIGHT: 73 IN | SYSTOLIC BLOOD PRESSURE: 150 MMHG | DIASTOLIC BLOOD PRESSURE: 79 MMHG | HEART RATE: 83 BPM | OXYGEN SATURATION: 95 % | RESPIRATION RATE: 16 BRPM

## 2024-04-23 DIAGNOSIS — M16.12 PRIMARY OSTEOARTHRITIS OF LEFT HIP: ICD-10-CM

## 2024-04-23 DIAGNOSIS — Z96.642 STATUS POST TOTAL REPLACEMENT OF LEFT HIP: Primary | ICD-10-CM

## 2024-04-23 PROBLEM — E66.9 OBESITY (BMI 30-39.9): Status: ACTIVE | Noted: 2024-04-23

## 2024-04-23 PROBLEM — I10 HTN (HYPERTENSION): Status: ACTIVE | Noted: 2024-04-23

## 2024-04-23 LAB
INR PPP: 1.29 (ref 0.89–1.12)
PROTHROMBIN TIME: 16.2 SECONDS (ref 12.2–14.5)

## 2024-04-23 PROCEDURE — 25010000002 PHENYLEPHRINE 10 MG/ML SOLUTION 1 ML VIAL: Performed by: ANESTHESIOLOGY

## 2024-04-23 PROCEDURE — 27130 TOTAL HIP ARTHROPLASTY: CPT | Performed by: ORTHOPAEDIC SURGERY

## 2024-04-23 PROCEDURE — 85610 PROTHROMBIN TIME: CPT | Performed by: ANESTHESIOLOGY

## 2024-04-23 PROCEDURE — 25810000003 SODIUM CHLORIDE 0.9 % SOLUTION: Performed by: ORTHOPAEDIC SURGERY

## 2024-04-23 PROCEDURE — 97162 PT EVAL MOD COMPLEX 30 MIN: CPT

## 2024-04-23 PROCEDURE — 25010000002 ROPIVACAINE PER 1 MG: Performed by: ORTHOPAEDIC SURGERY

## 2024-04-23 PROCEDURE — A9270 NON-COVERED ITEM OR SERVICE: HCPCS | Performed by: ORTHOPAEDIC SURGERY

## 2024-04-23 PROCEDURE — 76000 FLUOROSCOPY <1 HR PHYS/QHP: CPT

## 2024-04-23 PROCEDURE — 97116 GAIT TRAINING THERAPY: CPT

## 2024-04-23 PROCEDURE — S0260 H&P FOR SURGERY: HCPCS | Performed by: ORTHOPAEDIC SURGERY

## 2024-04-23 PROCEDURE — 25010000002 DEXAMETHASONE PER 1 MG: Performed by: ANESTHESIOLOGY

## 2024-04-23 PROCEDURE — A9270 NON-COVERED ITEM OR SERVICE: HCPCS | Performed by: ANESTHESIOLOGY

## 2024-04-23 PROCEDURE — 63710000001 METOPROLOL SUCCINATE XL 100 MG TABLET SUSTAINED-RELEASE 24 HOUR: Performed by: INTERNAL MEDICINE

## 2024-04-23 PROCEDURE — 25010000002 CEFAZOLIN PER 500 MG: Performed by: ORTHOPAEDIC SURGERY

## 2024-04-23 PROCEDURE — 63710000001 PREGABALIN 150 MG CAPSULE: Performed by: ORTHOPAEDIC SURGERY

## 2024-04-23 PROCEDURE — 25810000003 SODIUM CHLORIDE 0.9 % SOLUTION 250 ML FLEX CONT: Performed by: ANESTHESIOLOGY

## 2024-04-23 PROCEDURE — 63710000001 ACETAMINOPHEN EXTRA STRENGTH 500 MG TABLET: Performed by: ORTHOPAEDIC SURGERY

## 2024-04-23 PROCEDURE — 73502 X-RAY EXAM HIP UNI 2-3 VIEWS: CPT

## 2024-04-23 PROCEDURE — 27130 TOTAL HIP ARTHROPLASTY: CPT | Performed by: PHYSICIAN ASSISTANT

## 2024-04-23 PROCEDURE — 63710000001 OXYCODONE 5 MG TABLET: Performed by: ORTHOPAEDIC SURGERY

## 2024-04-23 PROCEDURE — 63710000001 VALSARTAN 160 MG TABLET: Performed by: INTERNAL MEDICINE

## 2024-04-23 PROCEDURE — S0260 H&P FOR SURGERY: HCPCS | Performed by: PHYSICIAN ASSISTANT

## 2024-04-23 PROCEDURE — C1755 CATHETER, INTRASPINAL: HCPCS | Performed by: ORTHOPAEDIC SURGERY

## 2024-04-23 PROCEDURE — 25010000002 BUPIVACAINE 0.5 % SOLUTION: Performed by: ANESTHESIOLOGY

## 2024-04-23 PROCEDURE — A9270 NON-COVERED ITEM OR SERVICE: HCPCS | Performed by: INTERNAL MEDICINE

## 2024-04-23 PROCEDURE — 97110 THERAPEUTIC EXERCISES: CPT

## 2024-04-23 PROCEDURE — 63710000001 FAMOTIDINE 20 MG TABLET: Performed by: ANESTHESIOLOGY

## 2024-04-23 PROCEDURE — 25010000002 ONDANSETRON PER 1 MG: Performed by: ANESTHESIOLOGY

## 2024-04-23 PROCEDURE — 25010000002 HYDROMORPHONE 1 MG/ML SOLUTION: Performed by: ORTHOPAEDIC SURGERY

## 2024-04-23 PROCEDURE — 25810000003 LACTATED RINGERS PER 1000 ML: Performed by: ANESTHESIOLOGY

## 2024-04-23 PROCEDURE — C1776 JOINT DEVICE (IMPLANTABLE): HCPCS | Performed by: ORTHOPAEDIC SURGERY

## 2024-04-23 PROCEDURE — 25010000002 PROPOFOL 10 MG/ML EMULSION: Performed by: ANESTHESIOLOGY

## 2024-04-23 DEVICE — R3 3 HOLE ACETABULAR SHELL 60MM
Type: IMPLANTABLE DEVICE | Site: HIP | Status: FUNCTIONAL
Brand: R3 ACETABULAR

## 2024-04-23 DEVICE — REFLECTION SPHERICAL HEAD SCREW 30MM
Type: IMPLANTABLE DEVICE | Site: HIP | Status: FUNCTIONAL
Brand: REFLECTION

## 2024-04-23 DEVICE — R3 0 DEGREE XLPE ACETABULAR LINER                                    36MM INNER DIAMETER X OUTER DIAMETER 60MM
Type: IMPLANTABLE DEVICE | Site: HIP | Status: FUNCTIONAL
Brand: R3

## 2024-04-23 DEVICE — DEV CONTRL TISS STRATAFIX SYMM PDS PLUS VIL CT-1 45CM: Type: IMPLANTABLE DEVICE | Site: HIP | Status: FUNCTIONAL

## 2024-04-23 DEVICE — R3 US DELTA HEAD 36 +4
Type: IMPLANTABLE DEVICE | Site: HIP | Status: FUNCTIONAL
Brand: R3

## 2024-04-23 DEVICE — IMPLANTABLE DEVICE: Type: IMPLANTABLE DEVICE | Site: HIP | Status: FUNCTIONAL

## 2024-04-23 DEVICE — DEV CONTRL TISS STRATAFIX SPIRAL MNCRYL UD 3/0 PLS 60CM: Type: IMPLANTABLE DEVICE | Site: HIP | Status: FUNCTIONAL

## 2024-04-23 DEVICE — POLARSTEM COLLAR STANDARD                                    NON-CEMENTED WITH TI/HA 6
Type: IMPLANTABLE DEVICE | Site: HIP | Status: FUNCTIONAL
Brand: POLARSTEM

## 2024-04-23 RX ORDER — LIDOCAINE HYDROCHLORIDE 10 MG/ML
INJECTION, SOLUTION EPIDURAL; INFILTRATION; INTRACAUDAL; PERINEURAL AS NEEDED
Status: DISCONTINUED | OUTPATIENT
Start: 2024-04-23 | End: 2024-04-23 | Stop reason: SURG

## 2024-04-23 RX ORDER — ROPIVACAINE HYDROCHLORIDE 5 MG/ML
INJECTION, SOLUTION EPIDURAL; INFILTRATION; PERINEURAL AS NEEDED
Status: DISCONTINUED | OUTPATIENT
Start: 2024-04-23 | End: 2024-04-23 | Stop reason: HOSPADM

## 2024-04-23 RX ORDER — SODIUM CHLORIDE 9 MG/ML
40 INJECTION, SOLUTION INTRAVENOUS AS NEEDED
Status: DISCONTINUED | OUTPATIENT
Start: 2024-04-23 | End: 2024-04-23 | Stop reason: HOSPADM

## 2024-04-23 RX ORDER — ONDANSETRON 2 MG/ML
INJECTION INTRAMUSCULAR; INTRAVENOUS AS NEEDED
Status: DISCONTINUED | OUTPATIENT
Start: 2024-04-23 | End: 2024-04-23 | Stop reason: SURG

## 2024-04-23 RX ORDER — FAMOTIDINE 20 MG/1
20 TABLET, FILM COATED ORAL ONCE
Status: COMPLETED | OUTPATIENT
Start: 2024-04-23 | End: 2024-04-23

## 2024-04-23 RX ORDER — MORPHINE SULFATE 4 MG/ML
4 INJECTION, SOLUTION INTRAMUSCULAR; INTRAVENOUS
Status: DISCONTINUED | OUTPATIENT
Start: 2024-04-23 | End: 2024-04-23 | Stop reason: HOSPADM

## 2024-04-23 RX ORDER — DOCUSATE SODIUM 100 MG/1
100 CAPSULE, LIQUID FILLED ORAL 2 TIMES DAILY
Qty: 30 CAPSULE | Refills: 0 | Status: SHIPPED | OUTPATIENT
Start: 2024-04-23 | End: 2024-05-08

## 2024-04-23 RX ORDER — ASPIRIN 81 MG/1
81 TABLET ORAL DAILY
Start: 2024-05-24

## 2024-04-23 RX ORDER — SODIUM CHLORIDE 0.9 % (FLUSH) 0.9 %
1-10 SYRINGE (ML) INJECTION AS NEEDED
Status: DISCONTINUED | OUTPATIENT
Start: 2024-04-23 | End: 2024-04-23 | Stop reason: HOSPADM

## 2024-04-23 RX ORDER — ONDANSETRON 2 MG/ML
4 INJECTION INTRAMUSCULAR; INTRAVENOUS EVERY 6 HOURS PRN
Status: DISCONTINUED | OUTPATIENT
Start: 2024-04-23 | End: 2024-04-23 | Stop reason: HOSPADM

## 2024-04-23 RX ORDER — ONDANSETRON 4 MG/1
4 TABLET, ORALLY DISINTEGRATING ORAL EVERY 6 HOURS PRN
Status: DISCONTINUED | OUTPATIENT
Start: 2024-04-23 | End: 2024-04-23 | Stop reason: HOSPADM

## 2024-04-23 RX ORDER — FENTANYL CITRATE 50 UG/ML
50 INJECTION, SOLUTION INTRAMUSCULAR; INTRAVENOUS
Status: DISCONTINUED | OUTPATIENT
Start: 2024-04-23 | End: 2024-04-23 | Stop reason: HOSPADM

## 2024-04-23 RX ORDER — LABETALOL HYDROCHLORIDE 5 MG/ML
10 INJECTION, SOLUTION INTRAVENOUS EVERY 4 HOURS PRN
Status: DISCONTINUED | OUTPATIENT
Start: 2024-04-23 | End: 2024-04-23 | Stop reason: HOSPADM

## 2024-04-23 RX ORDER — METOPROLOL SUCCINATE 100 MG/1
100 TABLET, EXTENDED RELEASE ORAL DAILY
Status: DISCONTINUED | OUTPATIENT
Start: 2024-04-23 | End: 2024-04-23 | Stop reason: HOSPADM

## 2024-04-23 RX ORDER — PHENYLEPHRINE HCL IN 0.9% NACL 1 MG/10 ML
SYRINGE (ML) INTRAVENOUS AS NEEDED
Status: DISCONTINUED | OUTPATIENT
Start: 2024-04-23 | End: 2024-04-23 | Stop reason: SURG

## 2024-04-23 RX ORDER — SODIUM CHLORIDE 9 MG/ML
120 INJECTION, SOLUTION INTRAVENOUS CONTINUOUS
Status: DISCONTINUED | OUTPATIENT
Start: 2024-04-23 | End: 2024-04-23 | Stop reason: HOSPADM

## 2024-04-23 RX ORDER — ASPIRIN 81 MG/1
81 TABLET ORAL 2 TIMES DAILY
Qty: 60 TABLET | Refills: 0 | Status: SHIPPED | OUTPATIENT
Start: 2024-04-24

## 2024-04-23 RX ORDER — HYDROMORPHONE HYDROCHLORIDE 1 MG/ML
0.5 INJECTION, SOLUTION INTRAMUSCULAR; INTRAVENOUS; SUBCUTANEOUS
Status: DISCONTINUED | OUTPATIENT
Start: 2024-04-23 | End: 2024-04-23 | Stop reason: HOSPADM

## 2024-04-23 RX ORDER — LIDOCAINE HYDROCHLORIDE 10 MG/ML
0.5 INJECTION, SOLUTION EPIDURAL; INFILTRATION; INTRACAUDAL; PERINEURAL ONCE
Status: COMPLETED | OUTPATIENT
Start: 2024-04-23 | End: 2024-04-23

## 2024-04-23 RX ORDER — NALOXONE HCL 0.4 MG/ML
0.4 VIAL (ML) INJECTION
Status: DISCONTINUED | OUTPATIENT
Start: 2024-04-23 | End: 2024-04-23 | Stop reason: HOSPADM

## 2024-04-23 RX ORDER — DROPERIDOL 2.5 MG/ML
0.62 INJECTION, SOLUTION INTRAMUSCULAR; INTRAVENOUS ONCE AS NEEDED
Status: DISCONTINUED | OUTPATIENT
Start: 2024-04-23 | End: 2024-04-23 | Stop reason: HOSPADM

## 2024-04-23 RX ORDER — MIDAZOLAM HYDROCHLORIDE 1 MG/ML
0.5 INJECTION INTRAMUSCULAR; INTRAVENOUS
Status: DISCONTINUED | OUTPATIENT
Start: 2024-04-23 | End: 2024-04-23 | Stop reason: HOSPADM

## 2024-04-23 RX ORDER — BUPIVACAINE HYDROCHLORIDE 5 MG/ML
INJECTION, SOLUTION PERINEURAL
Status: COMPLETED | OUTPATIENT
Start: 2024-04-23 | End: 2024-04-23

## 2024-04-23 RX ORDER — OXYCODONE HYDROCHLORIDE 5 MG/1
5 TABLET ORAL EVERY 4 HOURS PRN
Qty: 40 TABLET | Refills: 0 | Status: SHIPPED | OUTPATIENT
Start: 2024-04-23

## 2024-04-23 RX ORDER — OXYCODONE HYDROCHLORIDE 5 MG/1
5 TABLET ORAL EVERY 4 HOURS PRN
Status: DISCONTINUED | OUTPATIENT
Start: 2024-04-23 | End: 2024-04-23 | Stop reason: HOSPADM

## 2024-04-23 RX ORDER — ACETAMINOPHEN 500 MG
1000 TABLET ORAL EVERY 8 HOURS
Qty: 60 TABLET | Refills: 0 | Status: SHIPPED | OUTPATIENT
Start: 2024-04-23 | End: 2024-05-03

## 2024-04-23 RX ORDER — PROPOFOL 10 MG/ML
VIAL (ML) INTRAVENOUS AS NEEDED
Status: DISCONTINUED | OUTPATIENT
Start: 2024-04-23 | End: 2024-04-23 | Stop reason: SURG

## 2024-04-23 RX ORDER — PREGABALIN 150 MG/1
150 CAPSULE ORAL ONCE
Status: COMPLETED | OUTPATIENT
Start: 2024-04-23 | End: 2024-04-23

## 2024-04-23 RX ORDER — MAGNESIUM HYDROXIDE 1200 MG/15ML
LIQUID ORAL AS NEEDED
Status: DISCONTINUED | OUTPATIENT
Start: 2024-04-23 | End: 2024-04-23 | Stop reason: HOSPADM

## 2024-04-23 RX ORDER — TRANEXAMIC ACID 10 MG/ML
1000 INJECTION, SOLUTION INTRAVENOUS ONCE
Status: COMPLETED | OUTPATIENT
Start: 2024-04-23 | End: 2024-04-23

## 2024-04-23 RX ORDER — SODIUM CHLORIDE, SODIUM LACTATE, POTASSIUM CHLORIDE, CALCIUM CHLORIDE 600; 310; 30; 20 MG/100ML; MG/100ML; MG/100ML; MG/100ML
9 INJECTION, SOLUTION INTRAVENOUS CONTINUOUS
Status: DISCONTINUED | OUTPATIENT
Start: 2024-04-23 | End: 2024-04-23

## 2024-04-23 RX ORDER — DEXAMETHASONE SODIUM PHOSPHATE 4 MG/ML
INJECTION, SOLUTION INTRA-ARTICULAR; INTRALESIONAL; INTRAMUSCULAR; INTRAVENOUS; SOFT TISSUE AS NEEDED
Status: DISCONTINUED | OUTPATIENT
Start: 2024-04-23 | End: 2024-04-23 | Stop reason: SURG

## 2024-04-23 RX ORDER — VALSARTAN 160 MG/1
160 TABLET ORAL DAILY
Status: DISCONTINUED | OUTPATIENT
Start: 2024-04-23 | End: 2024-04-23 | Stop reason: HOSPADM

## 2024-04-23 RX ORDER — ASPIRIN 81 MG/1
81 TABLET ORAL EVERY 12 HOURS SCHEDULED
Status: DISCONTINUED | OUTPATIENT
Start: 2024-04-24 | End: 2024-04-23 | Stop reason: HOSPADM

## 2024-04-23 RX ORDER — ACETAMINOPHEN 500 MG
1000 TABLET ORAL ONCE
Status: COMPLETED | OUTPATIENT
Start: 2024-04-23 | End: 2024-04-23

## 2024-04-23 RX ORDER — NALOXONE HCL 0.4 MG/ML
0.1 VIAL (ML) INJECTION
Status: DISCONTINUED | OUTPATIENT
Start: 2024-04-23 | End: 2024-04-23 | Stop reason: SDUPTHER

## 2024-04-23 RX ORDER — SODIUM CHLORIDE 0.9 % (FLUSH) 0.9 %
10 SYRINGE (ML) INJECTION EVERY 12 HOURS SCHEDULED
Status: DISCONTINUED | OUTPATIENT
Start: 2024-04-23 | End: 2024-04-23 | Stop reason: HOSPADM

## 2024-04-23 RX ADMIN — PREGABALIN 150 MG: 150 CAPSULE ORAL at 07:26

## 2024-04-23 RX ADMIN — LIDOCAINE HYDROCHLORIDE 0.5 ML: 10 INJECTION, SOLUTION EPIDURAL; INFILTRATION; INTRACAUDAL; PERINEURAL at 06:45

## 2024-04-23 RX ADMIN — ONDANSETRON 4 MG: 2 INJECTION INTRAMUSCULAR; INTRAVENOUS at 07:56

## 2024-04-23 RX ADMIN — SODIUM CHLORIDE, POTASSIUM CHLORIDE, SODIUM LACTATE AND CALCIUM CHLORIDE: 600; 310; 30; 20 INJECTION, SOLUTION INTRAVENOUS at 08:22

## 2024-04-23 RX ADMIN — SODIUM CHLORIDE 120 ML/HR: 9 INJECTION, SOLUTION INTRAVENOUS at 12:47

## 2024-04-23 RX ADMIN — LIDOCAINE HYDROCHLORIDE 50 MG: 10 INJECTION, SOLUTION EPIDURAL; INFILTRATION; INTRACAUDAL; PERINEURAL at 07:37

## 2024-04-23 RX ADMIN — METOPROLOL SUCCINATE 100 MG: 100 TABLET, EXTENDED RELEASE ORAL at 13:51

## 2024-04-23 RX ADMIN — VALSARTAN 160 MG: 160 TABLET, FILM COATED ORAL at 13:51

## 2024-04-23 RX ADMIN — Medication 100 MCG: at 08:21

## 2024-04-23 RX ADMIN — Medication 100 MCG: at 08:24

## 2024-04-23 RX ADMIN — PROPOFOL 40 MG: 10 INJECTION, EMULSION INTRAVENOUS at 07:37

## 2024-04-23 RX ADMIN — DEXAMETHASONE SODIUM PHOSPHATE 4 MG: 4 INJECTION, SOLUTION INTRA-ARTICULAR; INTRALESIONAL; INTRAMUSCULAR; INTRAVENOUS; SOFT TISSUE at 07:46

## 2024-04-23 RX ADMIN — HYDROMORPHONE HYDROCHLORIDE 0.5 MG: 1 INJECTION, SOLUTION INTRAMUSCULAR; INTRAVENOUS; SUBCUTANEOUS at 13:57

## 2024-04-23 RX ADMIN — Medication 100 MCG: at 08:47

## 2024-04-23 RX ADMIN — SODIUM CHLORIDE, POTASSIUM CHLORIDE, SODIUM LACTATE AND CALCIUM CHLORIDE 9 ML/HR: 600; 310; 30; 20 INJECTION, SOLUTION INTRAVENOUS at 06:45

## 2024-04-23 RX ADMIN — TRANEXAMIC ACID 1000 MG: 10 INJECTION, SOLUTION INTRAVENOUS at 09:29

## 2024-04-23 RX ADMIN — PHENYLEPHRINE HYDROCHLORIDE 20 MCG/MIN: 10 INJECTION INTRAVENOUS at 08:54

## 2024-04-23 RX ADMIN — BUPIVACAINE HYDROCHLORIDE 1.8 ML: 5 INJECTION, SOLUTION PERINEURAL at 07:41

## 2024-04-23 RX ADMIN — FAMOTIDINE 20 MG: 20 TABLET, FILM COATED ORAL at 07:27

## 2024-04-23 RX ADMIN — SODIUM CHLORIDE 2 G: 900 INJECTION INTRAVENOUS at 16:30

## 2024-04-23 RX ADMIN — PROPOFOL 50 MCG/KG/MIN: 10 INJECTION, EMULSION INTRAVENOUS at 07:42

## 2024-04-23 RX ADMIN — Medication 100 MCG: at 08:30

## 2024-04-23 RX ADMIN — ACETAMINOPHEN 1000 MG: 500 TABLET ORAL at 07:26

## 2024-04-23 RX ADMIN — OXYCODONE 5 MG: 5 TABLET ORAL at 12:46

## 2024-04-23 RX ADMIN — OXYCODONE 5 MG: 5 TABLET ORAL at 18:11

## 2024-04-23 RX ADMIN — SODIUM CHLORIDE 2 G: 900 INJECTION INTRAVENOUS at 07:43

## 2024-04-23 RX ADMIN — TRANEXAMIC ACID 1000 MG: 10 INJECTION, SOLUTION INTRAVENOUS at 07:51

## 2024-04-23 NOTE — H&P
"Pre-Op H&P  Papito Salinas  [id number]  1945    Chief complaint: \"My left hip hurts\"    HPI:    Patient is a 78 y.o.male who presents with left hip pain that has been going on for about 6 months.  Denies any trauma.  It is slowly getting worse as time goes on.  Much more difficult with stairs.  It is moderate to severe in nature.  He has failed conservative therapy.  This includes, tincture of time, nonsteroidal anti-inflammatories, physical therapy.  After failing conservative therapy the patient is now admitted for left total hip replacement.    Review of Systems:  General ROS: negative for chills, fever or skin lesions;  No changes since last office visit.  Neg for recent sick exposure  Cardiovascular ROS: no chest pain or dyspnea on exertion  Respiratory ROS: no cough, shortness of breath, or wheezing    Allergies:   Allergies   Allergen Reactions    Nsaids GI Bleeding     colitis    Penicillins Other (See Comments)     Fever spiked and out for 7days       Home Meds:    No current facility-administered medications on file prior to encounter.     Current Outpatient Medications on File Prior to Encounter   Medication Sig Dispense Refill    allopurinol (ZYLOPRIM) 300 MG tablet Take 1 tablet by mouth Daily. 90 tablet 3    amitriptyline (ELAVIL) 25 MG tablet TAKE 1 TABLET BY MOUTH EVERY DAY AT NIGHT 90 tablet 3    aspirin 81 MG EC tablet Daily.      azithromycin (Zithromax Z-Buddy) 250 MG tablet Take 2 tablets by mouth on day 1, then 1 tablet daily on days 2-5 6 tablet 0    Chlorhexidine Gluconate 4 % solution Shower with solution daily as directed for 5 days prior to surgery. 237 mL 0    doxazosin (CARDURA) 4 MG tablet Take 1 tablet by mouth every night at bedtime. 90 tablet 3    gabapentin (NEURONTIN) 300 MG capsule TAKE 1 CAPSULE BY MOUTH EVERY NIGHT AT BEDTIME. 90 capsule 1    mesalamine (LIALDA) 1.2 g EC tablet Take 1 tablet by mouth Every 12 (Twelve) Hours.      metoprolol succinate XL (TOPROL-XL) 100 " MG 24 hr tablet TAKE 1 TABLET BY MOUTH EVERY DAY 90 tablet 3    omeprazole (priLOSEC) 40 MG capsule Take 1 capsule by mouth Daily. 90 capsule 3    predniSONE (DELTASONE) 20 MG tablet Take 1 tablet by mouth Daily. Use only as needed (Patient taking differently: Take 1 tablet by mouth Daily As Needed. Use only as needed) 30 tablet 2    temazepam (RESTORIL) 15 MG capsule TAKE 1 CAPSULE BY MOUTH AT NIGHT AS NEEDED FOR SLEEP. 30 capsule 5    valsartan (DIOVAN) 160 MG tablet Take 1 tablet by mouth Daily. 90 tablet 3       PMH:   Past Medical History:   Diagnosis Date    GERD (gastroesophageal reflux disease)     History of transfusion     Hypertension     Insomnia     Plantar fascial fibromatosis      PSH:    Past Surgical History:   Procedure Laterality Date    CATARACT EXTRACTION Bilateral     COLONOSCOPY      VEIN SURGERY       Patient denies allergy to contrast dye or latex  Immunization History:  Influenza: Yes  Pneumococcal: Yes  Tetanus: Probably not up-to-date    Social History:   Tobacco:   Social History     Tobacco Use   Smoking Status Every Day    Current packs/day: 0.50    Average packs/day: 0.5 packs/day for 50.0 years (25.0 ttl pk-yrs)    Types: Cigarettes   Smokeless Tobacco Former    Types: Chew      Alcohol:     Social History     Substance and Sexual Activity   Alcohol Use Yes    Comment: occasional       Vitals:           There were no vitals taken for this visit.    Physical Exam:  General Appearance:    Alert, cooperative, no distress, appears stated age   Head:    Normocephalic, without obvious abnormality, atraumatic   Lungs:     Clear to auscultation bilaterally, respirations unlabored    Heart:   Regular rate and rhythm, S1 and S2 normal, no murmur, rub    or gallop    Abdomen:    Soft, nontender.  +bowel sounds   Breast Exam:    deferred   Genitalia:    deferred   Extremities:   Extremities normal, atraumatic, no cyanosis or edema   Skin:   Skin color, texture, turgor normal, no rashes or  lesions   Neurologic:   Grossly intact   Results Review  LABS:  Lab Results   Component Value Date    WBC 5.64 04/09/2024    HGB 14.5 04/09/2024    HCT 45.3 04/09/2024    MCV 93.8 04/09/2024     04/09/2024    NEUTROABS 3.79 04/09/2024    GLUCOSE 96 04/09/2024    BUN 11 04/09/2024    CREATININE 1.07 04/09/2024    EGFRIFNONA 63 02/02/2022     04/09/2024    K 4.3 04/09/2024     04/09/2024    CO2 29.0 04/09/2024    CALCIUM 9.2 04/09/2024    ALBUMIN 4.2 01/23/2024    AST 30 01/23/2024    ALT 24 01/23/2024    BILITOT 0.6 01/23/2024    PTT 35.3 04/09/2024    INR 1.18 (H) 04/09/2024       RADIOLOGY:  No radiology results for the last 3 days     I reviewed the patient's new clinical results.    Cancer Staging (if applicable)  Cancer Patient: __ yes __no __unknown; If yes, clinical stage T:__ N:__M:__, stage group or __N/A    Impression: Primary left osteoarthritis of the hip  Degenerative joint disease  Lumbar sacral spondylosis  Gout      Plan: Left total hip arthroplasty anterior modified, left      RANDALL Jama   04/23/24   6:40 AM EDT     Agree with above - plan for left NAVNEET    Sumanth Durbin MD  04/23/24  07:18 EDT

## 2024-04-23 NOTE — ANESTHESIA PREPROCEDURE EVALUATION
Anesthesia Evaluation     Patient summary reviewed and Nursing notes reviewed   no history of anesthetic complications:   NPO Solid Status: > 8 hours  NPO Liquid Status: > 2 hours           Airway   Mallampati: II  TM distance: >3 FB  Neck ROM: full  No difficulty expected  Dental - normal exam     Pulmonary - normal exam   (+) a smoker Current,sleep apnea (possible)  (-) COPD, asthma, no home oxygen  Cardiovascular - normal exam  Exercise tolerance: good (4-7 METS)    ECG reviewed  Patient on routine beta blocker and Beta blocker given within 24 hours of surgery    (+) hypertension  (-) dysrhythmias, angina, CHF, cardiac stents      Neuro/Psych  (-) seizures, CVA  GI/Hepatic/Renal/Endo    (+) obesity, GERD well controlled  (-) liver disease, no renal disease, diabetes, no thyroid disorder    Musculoskeletal     Abdominal    Substance History   (+) alcohol use  (-) drug use     OB/GYN          Other        ROS/Med Hx Other: Hgb 14.5 k 4.3    No bleeding/clotting disorders  No blood thinner/antiplatelet use  No gerd/n/v                Anesthesia Plan    ASA 3     spinal     (Benefits, alternatives, and risks of neuraxial (failed block, damage to nearby structures  incl nerves/blood vessels), intravascular injection, hematoma req evacuation, headache,etc) discussed with patient.  Questions answered; pt desires to proceed.  )  intravenous induction     Anesthetic plan, risks, benefits, and alternatives have been provided, discussed and informed consent has been obtained with: patient.    Use of blood products discussed with patient  Consented to blood products.    Plan discussed with CRNA.    CODE STATUS:

## 2024-04-23 NOTE — THERAPY DISCHARGE NOTE
Patient Name: Papito Salinas  : 1945    MRN: [id number]                              Today's Date: 2024       Admit Date: 2024    Visit Dx:     ICD-10-CM ICD-9-CM   1. Primary osteoarthritis of left hip  M16.12 715.15     Patient Active Problem List   Diagnosis    Lumbosacral spondylosis without myelopathy    Generalized osteoarthrosis, involving multiple sites    Gout    Esophageal reflux    Hypertrophy of prostate without urinary obstruction    Tobacco abuse    Degenerative arthritis of hip     Past Medical History:   Diagnosis Date    Arthritis     Edema     Lower extremities    GERD (gastroesophageal reflux disease)     History of transfusion     Hypertension     Insomnia     Plantar fascial fibromatosis     Tibia fracture     Left     Past Surgical History:   Procedure Laterality Date    CATARACT EXTRACTION Bilateral     COLONOSCOPY      VEIN SURGERY Left     Vein Stripping      General Information       Row Name 24 1407          Physical Therapy Time and Intention    Document Type evaluation  -SC     Mode of Treatment physical therapy  -SC       Row Name 24 1407          General Information    Patient Profile Reviewed yes  -SC     Prior Level of Function independent:;gait  did not use a cane or walker, No falls,. Was able to get up/down stairs  -SC     Existing Precautions/Restrictions left;hip, anterior  Dr Durbin modified  -SC     Barriers to Rehab none identified  -SC       Row Name 24 140          Living Environment    People in Home spouse  -SC       Row Name 24 1407          Home Main Entrance    Number of Stairs, Main Entrance six;other (see comments)  2 stairs, no rails to front door  -SC     Stair Railings, Main Entrance railings safe and in good condition  -SC       Row Name 24 1407          Stairs Within Home, Primary    Number of Stairs, Within Home, Primary none  -SC       Row Name 24 140          Cognition    Orientation Status  (Cognition) oriented x 4  -SC       Row Name 04/23/24 1407          Safety Issues, Functional Mobility    Impairments Affecting Function (Mobility) balance;motor control;pain;strength  -SC     Comment, Safety Issues/Impairments (Mobility) alert, following commands  -SC               User Key  (r) = Recorded By, (t) = Taken By, (c) = Cosigned By      Initials Name Provider Type    SC Scout Mitchell PT Physical Therapist                   Mobility       Row Name 04/23/24 1409          Bed Mobility    Bed Mobility scooting/bridging;supine-sit  -SC     Scooting/Bridging [place] (Bed Mobility) independent  -SC     Supine-Sit [place] (Bed Mobility) modified independence;verbal cues  -SC     Assistive Device (Bed Mobility) head of bed elevated;draw sheet  -SC     Comment, (Bed Mobility) able to get up to edge ofgbed with extra time  -SC       Row Name 04/23/24 1409          Sit-Stand Transfer    Sit-Stand [place] (Transfers) 2 person assist;contact guard  -SC     Assistive Device (Sit-Stand Transfers) walker, front-wheeled  -SC     Comment, (Sit-Stand Transfer) cues for sequencing and safety. Able to use had effectively to transfrer  -SC       Row Name 04/23/24 1409          Gait/Stairs (Locomotion)    [place] Level (Gait) 1 person assist;1 person to manage equipment;contact guard;verbal cues  -SC     Assistive Device (Gait) walker, front-wheeled  -SC     Patient was able to Ambulate yes  -SC     Distance in Feet (Gait) 170  -SC     Deviations/Abnormal Patterns (Gait) left sided deviations;antalgic;stride length decreased;weight shifting decreased;soraya decreased  -SC     Bilateral Gait Deviations heel strike decreased  -SC     Left Sided Gait Deviations forward flexed posture  -SC     Handrail Location (Stairs) right side (ascending)  -SC     Number of Steps (Stairs) 10  -SC     Comment, (Gait/Stairs) Gt training focused on controling walker with step through gait pattern. Encouraged larger step on  L and heel strike. Patient able to stay close to walker in hallway and on turns. On stairs- worked on using cane and HHA to go up 2 steps with cane. Then worked on uisng R hand rail and cane. Wife presend and participated in guarding patient with gt belt and HHA. Wife stated her son will be assisting . No LOB noted  -SC               User Key  (r) = Recorded By, (t) = Taken By, (c) = Cosigned By      Initials Name Provider Type    SC Scout Mitchell PT Physical Therapist                   Obj/Interventions       [facility] Name 04/23/24 1415          Range of Motion Comprehensive    General Range of Motion bilateral lower extremity ROM WNL  -[facility] Name 04/23/24 1415          Strength Comprehensive (MMT)    Comment, General Manual Muscle Testing (MMT) Assessment Bilateral quads 4+/5 , tib ant 4+/5  -[facility] Name 04/23/24 1415          Motor Skills    Therapeutic Exercise hip;knee;ankle  -SC       Row Name 04/23/24 1415          Hip (Therapeutic Exercise)    Hip (Therapeutic Exercise) AROM (active range of motion)  -SC     Hip AROM (Therapeutic Exercise) left;flexion;extension;aBduction;aDduction;10 repetitions;sitting  -[facility] Name 04/23/24 1415          Knee (Therapeutic Exercise)    Knee (Therapeutic Exercise) isometric exercises;AROM (active range of motion)  -SC     Knee AROM (Therapeutic Exercise) left;extension;flexion;sitting;10 repetitions  -SC     Knee Isometrics (Therapeutic Exercise) right;quad sets;10 repetitions  -SC       Row Name 04/23/24 1415          Ankle (Therapeutic Exercise)    Ankle (Therapeutic Exercise) AROM (active range of motion)  -SC     Ankle AROM (Therapeutic Exercise) bilateral;dorsiflexion;plantarflexion;10 repetitions  -SC       Row Name 04/23/24 1415          Balance    Balance Assessment standing dynamic balance  -SC     Dynamic Standing Balance 1-person assist;contact guard  -SC     Position/Device Used, Standing Balance supported;walker, rolling  -SC     Comment,  Balance no LOB  -SC       Row Name 04/23/24 1415          Sensory Assessment (Somatosensory)    Sensory Assessment (Somatosensory) other (see comments)  chronic numbness in feet  -SC               User Key  (r) = Recorded By, (t) = Taken By, (c) = Cosigned By      Initials Name Provider Type    SC Scout Mitchell, PT Physical Therapist                   Goals/Plan    No documentation.                  Clinical Impression       [facility] Name 04/23/24 1418          Pain    Pretreatment Pain Rating 4/10  -SC     Posttreatment Pain Rating 6/10  -SC     Pain Location - Side/Orientation Left  -SC     Pain Location anterior  -SC     Pain Location - hip  -SC     Pain Intervention(s) Cold applied;Repositioned  -[facility] Name 04/23/24 1418          Plan of Care Review    Plan of Care Reviewed With patient;spouse  -SC     Progress improving  -SC     Outcome Evaluation Patient demonstrated ability to control walker safely in hallway and assend stairs with cane. He will be going home with his wife and son will assist him up stairs.  -[facility] Name 04/23/24 1418          Therapy Assessment/Plan (PT)    Patient/Family Therapy Goals Statement (PT) go home  -SC     Rehab Potential (PT) good, to achieve stated therapy goals  -SC     Criteria for Skilled Interventions Met (PT) yes;meets criteria  -SC     Therapy Frequency (PT) evaluation only  -[facility] Name 04/23/24 1418          Vital Signs    Intra Systolic BP Rehab 195  -SC     Intra Treatment Diastolic BP 95  RN notified and aware. No symptoms of headach or dizziness  -[facility] Name 04/23/24 1418          Positioning and Restraints    Pre-Treatment Position in bed  -SC     Post Treatment Position chair  -SC     In Chair notified nsg;reclined;sitting;call light within reach;encouraged to call for assist;exit alarm on;with family/caregiver  -SC               User Key  (r) = Recorded By, (t) = Taken By, (c) = Cosigned By      Initials Name Provider Type    SC Paula,  Scout HUGHES PT Physical Therapist                   Outcome Measures       Row Name 04/23/24 1420          PADD    Diagnosis 2  -SC     Gender 2  -SC     Age Group 1  -SC     Gait Distance 1  no further walking due to high BP  -SC     Assist Level 1  -SC     Home Support 3  -SC     PADD Score 9  -SC     Prediction by PADD Score directly home (with home health or out-patient rehab)  -SC       Row Name 04/23/24 1420          Functional Assessment    Outcome Measure Options PADD  -SC               User Key  (r) = Recorded By, (t) = Taken By, (c) = Cosigned By      Initials Name Provider Type    SC Scout Mitchell PT Physical Therapist                  Physical Therapy Education       Title: PT OT SLP Therapies (Done)       Topic: Physical Therapy (Done)       Point: Mobility training (Done)       Learning Progress Summary             Patient Eager, E,TB,D,H, DU,VU by SC at 4/23/2024 1421    Comment: reviewed HEP and hip precations                         Point: Home exercise program (Done)       Learning Progress Summary             Patient Eager, E,TB,D,H, DU,VU by SC at 4/23/2024 1421    Comment: reviewed HEP and hip precations                         Point: Body mechanics (Done)       Learning Progress Summary             Patient Eager, E,TB,D,H, DU,VU by SC at 4/23/2024 1421    Comment: reviewed HEP and hip precations                         Point: Precautions (Done)       Learning Progress Summary             Patient Eager, E,TB,D,H, DU,VU by SC at 4/23/2024 1421    Comment: reviewed HEP and hip precations                                         User Key       Initials Effective Dates Name Provider Type [facility] 02/03/23 -  Scout Mitchell PT Physical Therapist PT                  PT Recommendation and Plan     Plan of Care Reviewed With: patient, spouse  Progress: improving  Outcome Evaluation: Patient demonstrated ability to control walker safely in hallway and assend stairs with cane. He will be going  home with his wife and son will assist him up stairs.     Time Calculation:   PT Evaluation Complexity  History, PT Evaluation Complexity: 3 or more personal factors and/or comorbidities  Examination of Body Systems (PT Eval Complexity): total of 4 or more elements  Clinical Presentation (PT Evaluation Complexity): evolving  Clinical Decision Making (PT Evaluation Complexity): moderate complexity  Overall Complexity (PT Evaluation Complexity): moderate complexity     PT Charges       Row Name 04/23/24 1308             Time Calculation    Start Time 1308  -SC      PT Received On 04/23/24  -SC         Timed Charges    67293 - PT Therapeutic Exercise Minutes 15  -SC      15888 - Gait Training Minutes  15  -SC         Untimed Charges    PT Eval/Re-eval Minutes 45  -SC         Total Minutes    Timed Charges Total Minutes 30  -SC      Untimed Charges Total Minutes 45  -SC       Total Minutes 75  -SC                User Key  (r) = Recorded By, (t) = Taken By, (c) = Cosigned By      Initials Name Provider Type    SC Scout Mitchell, PT Physical Therapist                  Therapy Charges for Today       Code Description Service Date Service Provider Modifiers Qty    21868273312 HC PT THER PROC EA 15 MIN 4/23/2024 Scout Mitchell, PT GP 1    77237450799 HC GAIT TRAINING EA 15 MIN 4/23/2024 Scout Mitchell, PT GP 1    16890328908 HC PT THER SUPP EA 15 MIN 4/23/2024 Scout Mitchell, PT GP 3    19349981891 HC PT EVAL MOD COMPLEXITY 3 4/23/2024 Scout Mitchell, PT GP 1            PT G-Codes  Outcome Measure Options: PADD         Scout Mitchell, PT  4/23/2024

## 2024-04-23 NOTE — ANESTHESIA POSTPROCEDURE EVALUATION
Patient: Papito Salinas    Procedure Summary       Date: 04/23/24 [facility]    Anesthesia Start: 0734 Anesthesia Stop: 1016    Procedure: TOTAL HIP ARTHROPLASTY ANTERIOR MODIFIED (Left: Hip) Diagnosis:       Primary osteoarthritis of left hip      (Primary osteoarthritis of left hip [M16.12])    Surgeons: Sumanth Durbin MD Provider: Seble Trimble DO    Anesthesia Type: spinal ASA Status: 3            Anesthesia Type: spinal    Vitals  Vitals Value Taken Time   /76 04/23/24 1022   Temp 97.8 °F (36.6 °C) 04/23/24 1014   Pulse 71 04/23/24 1024   Resp 16 04/23/24 1014   SpO2 96 % 04/23/24 1024   Vitals shown include unfiled device data.        Post Anesthesia Care and Evaluation    Patient location during evaluation: PACU  Patient participation: complete - patient participated  Level of consciousness: sleepy but conscious  Pain score: 0  Pain management: adequate    Airway patency: patent  Anesthetic complications: No anesthetic complications  PONV Status: none  Cardiovascular status: hemodynamically stable and acceptable  Respiratory status: nonlabored ventilation, acceptable and nasal cannula  Hydration status: acceptable  Post Neuraxial Block status: No signs or symptoms of PDPH

## 2024-04-23 NOTE — PLAN OF CARE
Goal Outcome Evaluation:  Plan of Care Reviewed With: patient, spouse        Progress: improving  Outcome Evaluation: Patient demonstrated ability to control walker safely in hallway and assend stairs with cane. He will be going home with his wife and son will assist him up stairs.

## 2024-04-23 NOTE — OP NOTE
DATE OF PROCEDURE:  04/23/24    PREOPERATIVE DIAGNOSIS: left hip arthritis    POSTOPERATIVE DIAGNOSIS: left hip arthritis    PROCEDURE PERFORMED: left total hip arthroplasty with Smith & Nephew components, anterior modified Quan-Dela Cruz approach    Surgical Approach: Hip Modified Anterior (Quan-Dela Cruz)    IMPLANTS: # 60 press-fit R3 cup, 30 screw, neutral polyethylene liner, # 6 standard offset press-fit Polar stem, +4 x 36 Bio-lox ceramic head    SURGEON: Smuanth Durbin MD    ASSISTANT: Idalia Castro PA-C  (Idalia Castro PA-C was present and necessary for positioning, draping, retraction, instrumentation and closure.)    SPECIMENS: None    IMPLANTS:   Implant Name Type Inv. Item Serial No.  Lot No. LRB No. Used Action   DEV CONTRL TISS STRATAFIX SPIRAL MNCRYL UD 3/0 PLS 60CM - [id number] Implant DEV CONTRL TISS STRATAFIX SPIRAL MNCRYL UD 3/0 PLS 60CM  [employer]  DIV OF J AND J [id number] Left 1 Implanted   DEV CONTRL TISS STRATAFIX SYMM PDS PLUS ELISHA CT-1 45CM - [id number] Implant DEV CONTRL TISS STRATAFIX SYMM PDS PLUS ELISHA CT-1 45CM  [employer]  DIV OF J AND J [id number] Left 1 Implanted   SHLL ACET R3 3H STD 60MM - [id number] Implant SHLL ACET R3 3H STD 60MM  [employer] [id number] Left 1 Implanted   SCRW SPH HD REFLECTION 6.5X30MM - [id number] Implant SCRW SPH HD REFLECTION 6.5X30MM  [employer] [id number] Left 1 Implanted   LINER ACET R3 XLPE 0D [id number] - [id number] Implant LINER ACET R3 XLPE 0D [id number]  [employer] [id number] Left 1 Implanted   STEM FEM/HIP POLARSTEM W/COLR STD SZ6 - [id number] Implant STEM FEM/HIP POLARSTEM W/COLR STD SZ6  [employer] [id number] Left 1 Implanted   HD FEM/HIP BIOLOXDELTA R3 12/14 MD 36MM PLS4 - [id number] Implant HD FEM/HIP BIOLOXDELTA R3 12/14 MD 36MM PLS4  [employer] [id number] Left 1 Implanted         ANESTHESIA:  Spinal    STAFF:  Circulator: Brice Leon RN  Scrub Person: Asha, Pawel A  Nursing Assistant: Angela Barros  Assistant:  Idalia Castro PA-C    ESTIMATED BLOOD LOSS: 200ml     COMPLICATIONS: None    PREOPERATIVE ANTIBIOTICS: Ancef 2 g    INDICATIONS: The patient is a 78 y.o. male with a history of debilitating left hip pain secondary to osteoarthritis, that failed to improve in spite of conservative treatment. The patient opted for a left total hip arthroplasty at this time and consented for the procedure. Please see my office notes for details with regard to preoperative counseling and operative rationale.     DESCRIPTION OF PROCEDURE: The patient was positively identified in the preoperative holding area, brought to the operative suite, and placed in a supine position. After adequate spinal anesthetic had been achieved, the patient was placed in the supine position with a well padded folded blanket bolster under the left flank area, leaving the buttock free. After sterile prep and drape of the left hip and lower extremity, as well as draping the non-operative extremity to allow access for limb length assessment, a timeout procedure was performed to confirm the operative site, as well as the other parameters.     After placing a bump under the knee, a skin incision was made over the lateral border of the tensor fascia latae from the level of the anterior superior iliac spine distally on the anterior aspect of the hip for a modified Quan-Dela Cruz approach. Following a sharp skin incision, dissection was carried down to the level of the fascia, ensuring clear identification of the interval between the tensor and the fascia laterally.  Fascia was then incised from proximal to distal, leaving a good cuff of tissue for later repair, then working form distal to proximal perforating vessels were identified and cauterized, including the perforating vessels along the anterior edge of the gluteus medius.  With the anterior edge of the gluteus medius identified, a Cobra retractor was placed on the capsule over the superior aspect of the  "femoral neck.  After identifying the superior edge of the vastus lateralis distally, a second Cobra retractor was placed over the capsule overlying the inferior femoral neck.  The reflected head of the rectus femoris was identified and a single prong acetabular retractor was placed under the rectus. The knee bump was then removed, leg externally rotated, and anterior capsule excised and the labrum incised superiorly.  Cobra retractors were repositioned on the exposed femoral neck both superiorly and inferiorly.  Description of femoral head: Complete eburnation, with osteophytes at the head neck junction.  A neck cut was then made at the head and neck junction with the aid of an oscillating saw blade, followed by a second cut at the base of the femoral neck.  The \"napkin ring\" femoral neck fragment was removed, as well as the femoral head.    Acetabular exposure was then obtained with careful retractor placement and the labrum was then removed from the rim of the acetabulum, preserving the transverse acetabular ligament.  Description of acetabulum: Diffuse degenerative changes, with diminutive labrum around the periphery, with anterior and inferior acetabular osteophytes. With the transverse acetabular ligament as a reference for cup positioning, the acetabulum was sequentially reamed up to 60 to accommodate a 60 press-fit R3 cup, which had excellent press-fit characteristics.  A single 30 mm screw was placed which had good purchase, followed by a neutral polyethylene liner.    Attention was then redirected towards the femoral aspect. The non-operative limb was then placed on a padded Perez stand, to allow for appropriate positioning of the operative limb.  Using the bone hook for traction in the calcar region of the proximal femur, the posterior capsule was released adjacent to the greater trochanter to allow for delivery of the proximal femur with a double fang retractor.  After appropriate external rotation of the " proximal femur and further adduction of the leg, a Salinas retractor was placed in the region of the calcar and femoral preparations were made to accommodate the femoral stem.  Box osteotome was used to prepare the lateral aspect, followed by the T-handle canal finder, then sequential broaching of the femur to accommodate a # 6 broach, standard offset neck, with a +0 x 36 head.      Trial reduction was performed, full arc of motion noted, with appropriate limb lengths after leveling the table.  Intraoperative fluoroscopy showed appropriate implant alignment and slightly short leg lengths.  The hip was again dislocated and the final # 6 standard offset press-fit Polar stem placed, trial performed with a +4 x 36 trial head with excellent stability throughout the full arc of motion and no dislocation with appropriate limb lengths, therefore followed by +4 x 36 ceramic head, with the same reduction characteristics were noted as with the trial with no dislocation throughout the full arc of motion and appropriate limb lengths.      Therefore, the hip was copiously irrigated and attention directed towards closure. Fascia latae was closed with #1 Vicryl in an interrupted figure-of-eight fashion in 3 strategic locations both proximally, distally and in the central portion, followed by oversewing this from distal to proximal with a #1 StrataFix symmetric, which nicely sealed that layer, followed by closure of the subcutaneous layer with 2-0 Vicryl and the skin with 3-0 StrataFix in a running subcuticular fashion.  Adhesive wound closure dressing was applied followed by a sterile dressing with 4 x 4s secured with micropore tape.  The patient tolerated the procedure well and was brought to the recovery room in good condition.     POSTOPERATIVE PLAN:  1. The patient will begin early range of motion and weight-bearing per the post anterior total hip arthroplasty protocol.   2. I anticipate brief hospitalization for initial  rehabilitation and pain control followed by continued rehabilitation home health/outpatient physical therapy setting.  Patient may barrier for discharge later today if he is cleared medically and by physical therapy.  Follow-up in 3 weeks as planned.   3. Postoperative medical management with Dr. Pina.  4. Postoperative DVT prophylaxis with baby aspirin twice a day for 1 month.  5. Postoperative IV antibiotics with Ancef.      Sumanth Durbin MD  04/23/24  10:04 EDT

## 2024-04-23 NOTE — ANESTHESIA PROCEDURE NOTES
Spinal Block      Patient reassessed immediately prior to procedure    Patient location during procedure: OR  Indication:at surgeon's request  Preanesthetic Checklist  Completed: patient identified, IV checked, site marked, risks and benefits discussed, surgical consent, monitors and equipment checked, pre-op evaluation and timeout performed  Spinal Block Prep:  Patient Position:sitting  Sterile Tech:cap, gloves, sterile barriers and mask  Prep:Chloraprep  Patient Monitoring:blood pressure monitoring, continuous pulse oximetry and EKG    Spinal Block Procedure  Approach:midline  Guidance:landmark technique and palpation technique  Location:L3-L4  Needle Type:Quincke  Needle Gauge:22 G  Placement of Spinal needle event:cerebrospinal fluid aspirated  Paresthesia: no  Fluid Appearance:clear  Medications: bupivacaine (MARCAINE) 0.5 % injection - Injection   1.8 mL - 4/23/2024 7:41:00 AM   Post Assessment  Patient Tolerance:patient tolerated the procedure well with no apparent complications  Complications no  Additional Notes  Procedure:  Pt assisted to sitting position, with legs in position of comfort over side of bed.  Pt. instructed in optimal spine presentation, the spine was prepped/ Draped and the skin at insertion site was anesthetized with 1% Lidocaine 2 ml.  The spinal needle was then advanced until CSF flow was obtained and LA was injected:

## 2024-04-23 NOTE — H&P
Patient Name: Papito Salinas  MRN: [id number]  : 1945  DOS: 2024    Attending: Sumanth Durbin MD    Primary Care Provider: Dick Brewster MD      Chief complaint: Left hip pain    Subjective   Patient is a pleasant 78 y.o. male presented for scheduled surgery by Dr. Durbin.    Per his note ( The patient is a 78 y.o. male with a history of debilitating left hip pain secondary to osteoarthritis, that failed to improve in spite of conservative treatment. The patient opted for a left total hip arthroplasty at this time and consented for the procedure. Please see my office notes for details with regard to preoperative counseling and operative rationale. )    He underwent left total hip arthroplasty under spinal anesthesia, tolerated surgery well.    Seen in his room postop, he is doing well, no complaints of nausea, vomiting, or shortness of breath.  His pain control is adequate.  He has ambulated with PT.    He has no history of DVT or PE.    Motivated to achieve goals and meets criteria for home discharge today.    Allergies   Allergen Reactions    Nsaids GI Bleeding     colitis    Penicillins Other (See Comments)     Fever spiked and out for 7days       Medications Prior to Admission   Medication Sig Dispense Refill Last Dose    amitriptyline (ELAVIL) 25 MG tablet TAKE 1 TABLET BY MOUTH EVERY DAY AT NIGHT 90 tablet 3 2024 at 2300    Chlorhexidine Gluconate 4 % solution Shower with solution daily as directed for 5 days prior to surgery. 237 mL 0 2024 at 2200    doxazosin (CARDURA) 4 MG tablet Take 1 tablet by mouth every night at bedtime. 90 tablet 3 2024 at 2300    gabapentin (NEURONTIN) 300 MG capsule TAKE 1 CAPSULE BY MOUTH EVERY NIGHT AT BEDTIME. 90 capsule 1 2024 at 2300    mesalamine (LIALDA) 1.2 g EC tablet Take 1 tablet by mouth Every 12 (Twelve) Hours.   2024 at 1000    metoprolol succinate XL (TOPROL-XL) 100 MG 24 hr tablet TAKE 1 TABLET BY MOUTH EVERY  "DAY 90 tablet 3 4/22/2024 at 1000    allopurinol (ZYLOPRIM) 300 MG tablet Take 1 tablet by mouth Daily. 90 tablet 3 4/21/2024 at 83138    aspirin 81 MG EC tablet Daily.   4/17/2024    HYDROcodone-acetaminophen (Norco) 7.5-325 MG per tablet Take 1 tablet by mouth Every 6 (Six) Hours As Needed for Moderate Pain. 90 tablet 0 More than a month    omeprazole (priLOSEC) 40 MG capsule Take 1 capsule by mouth Daily. 90 capsule 3 4/21/2024 at 1000    predniSONE (DELTASONE) 20 MG tablet Take 1 tablet by mouth Daily. Use only as needed (Patient taking differently: Take 1 tablet by mouth Daily As Needed. Use only as needed) 30 tablet 2 More than a month    temazepam (RESTORIL) 15 MG capsule TAKE 1 CAPSULE BY MOUTH AT NIGHT AS NEEDED FOR SLEEP. 30 capsule 5 More than a month    valsartan (DIOVAN) 160 MG tablet Take 1 tablet by mouth Daily. 90 tablet 3 4/20/2024 at 1000          Past Medical History:   Diagnosis Date    Arthritis     Edema     Lower extremities    GERD (gastroesophageal reflux disease)     History of transfusion     Hypertension     Insomnia     Plantar fascial fibromatosis     Tibia fracture     Left     Past Surgical History:   Procedure Laterality Date    CATARACT EXTRACTION Bilateral     COLONOSCOPY      VEIN SURGERY Left     Vein Stripping     Family History   Problem Relation Age of Onset    Cancer Mother     Cancer Father      Social History     Tobacco Use    Smoking status: Every Day     Current packs/day: 0.25     Average packs/day: 0.3 packs/day for 50.0 years (12.5 ttl pk-yrs)     Types: Cigarettes    Smokeless tobacco: Former     Types: Chew   Vaping Use    Vaping status: Never Used   Substance Use Topics    Alcohol use: Yes     Comment: occasional    Drug use: No       Review of Systems  Pertinent items are noted in HPI    Vital Signs  BP (!) 182/92 (BP Location: Right arm, Patient Position: Sitting)   Pulse 68   Temp 98.2 °F (36.8 °C) (Temporal)   Resp 16   Ht 185.4 cm (73\")   Wt 110 kg " "(243 lb)   SpO2 94%   BMI 32.06 kg/m²     Physical Exam:    General Appearance:    Alert, cooperative, in no acute distress   Head:    Normocephalic, without obvious abnormality, atraumatic   Eyes:            Lids and lashes normal, conjunctivae and sclerae normal, no   icterus, no pallor, corneas clear    Ears:    Ears appear intact with no abnormalities noted   Throat:   No oral lesions, no thrush, oral mucosa moist   Neck:   No adenopathy, supple, trachea midline, no thyromegaly         Lungs:     Clear to auscultation,respirations regular, even and   unlabored. No wheezes or rales.    Heart:    Regular rhythm and normal rate, normal S1 and S2, no murmur, no gallop   Abdomen:     Normal bowel sounds, no masses, no organomegaly, soft        non-tender, non-distended, no guarding, no rebound                 tenderness   Genitalia:    Deferred   Extremities: Left LE, CDI dressing over left hip.  No clubbing, cyanosis, or edema.   Pulses:   Pulses palpable and equal bilaterally   Skin:   No bleeding, bruising or rash   Neurologic:   Cranial nerves 2 - 12 grossly intact, Flexion and dorsiflexion intact bilateral feet.        I reviewed the patient's new clinical results.             Invalid input(s): \"NEUTOPHILPCT\"        Invalid input(s): \"LABALBU\", \"PROT\"  Lab Results   Component Value Date    HGBA1C 5.00 04/09/2024      Latest Reference Range & Units 04/09/24 12:46   Sodium 136 - 145 mmol/L 139   Potassium 3.5 - 5.2 mmol/L 4.3   Chloride 98 - 107 mmol/L 102   CO2 22.0 - 29.0 mmol/L 29.0   Anion Gap 5.0 - 15.0 mmol/L 8.0   BUN 8 - 23 mg/dL 11   Creatinine 0.76 - 1.27 mg/dL 1.07   BUN/Creatinine Ratio 7.0 - 25.0  10.3   eGFR >60.0 mL/min/1.73 71.5   Glucose 65 - 99 mg/dL 96   Calcium 8.6 - 10.5 mg/dL 9.2        Latest Reference Range & Units 04/09/24 12:46   WBC 3.40 - 10.80 10*3/mm3 5.64   RBC 4.14 - 5.80 10*6/mm3 4.83   Hemoglobin 13.0 - 17.7 g/dL 14.5   Hematocrit 37.5 - 51.0 % 45.3   Platelets 140 - 450 " 10*3/mm3 199   RDW 12.3 - 15.4 % 14.0   MCV 79.0 - 97.0 fL 93.8   MCH 26.6 - 33.0 pg 30.0   MCHC 31.5 - 35.7 g/dL 32.0   MPV 6.0 - 12.0 fL 10.1   RDW-SD 37.0 - 54.0 fl 48.6     Assessment and Plan:       Status post total replacement of left hip    Gout    Esophageal reflux    Degenerative arthritis of hip    HTN (hypertension)    Obesity (BMI 30-39.9)      Plan:    1. PT/OT,  Weight bearing as tolerated left LE.  Total hip precautions  2. Pain control-prns  3. IS-encourage  4. DVT proph- Mechanicals and aspirin  5. Bowel regimen  6. Resume home medications as appropriate  7.  DC planning for home    Patient is very motivated to work with physical therapy and achieve  mobility and pain control among other goals for possible discharge home later in the day.    We reviewed these goals and discussed with patient tracking  progress for the next few hours and if all is achieved to receive next antibiotic prophylactic dose and be discharged home.     We discussed medications and precriptions at time of discharge including DVT prophylaxis, pain control, and bowel regimen.  All questions were answered .    Patient expressed understanding and agreement.    Dragon disclaimer:  Part of this encounter note is an electronic transcription/translation of spoken language to printed text. The electronic translation of spoken language may permit erroneous, or at times, nonsensical words or phrases to be inadvertently transcribed; Although I have reviewed the note for such errors, some may still exist.    Fabi Pina MD  04/23/24  15:53 EDT

## 2024-04-24 ENCOUNTER — TREATMENT (OUTPATIENT)
Dept: PHYSICAL THERAPY | Facility: CLINIC | Age: 79
End: 2024-04-24
Payer: MEDICARE

## 2024-04-24 DIAGNOSIS — Z74.09 IMPAIRED FUNCTIONAL MOBILITY, BALANCE, GAIT, AND ENDURANCE: ICD-10-CM

## 2024-04-24 DIAGNOSIS — Z96.642 STATUS POST HIP REPLACEMENT, LEFT: Primary | ICD-10-CM

## 2024-04-24 DIAGNOSIS — R29.898 HIP WEAKNESS: ICD-10-CM

## 2024-04-24 PROCEDURE — 97161 PT EVAL LOW COMPLEX 20 MIN: CPT | Performed by: PHYSICAL THERAPIST

## 2024-04-24 PROCEDURE — 97110 THERAPEUTIC EXERCISES: CPT | Performed by: PHYSICAL THERAPIST

## 2024-04-24 PROCEDURE — 97530 THERAPEUTIC ACTIVITIES: CPT | Performed by: PHYSICAL THERAPIST

## 2024-04-25 ENCOUNTER — TRANSITIONAL CARE MANAGEMENT TELEPHONE ENCOUNTER (OUTPATIENT)
Dept: ORTHOPEDIC SURGERY | Facility: CLINIC | Age: 79
End: 2024-04-25
Payer: MEDICARE

## 2024-04-25 NOTE — OUTREACH NOTE
Patient: Papito Salinas  : 1945  Age/Gender: 78 y.o. male  Surgical Procedure: LT NAVNEET  Surgeon: DR. LARSON  Surgery Date: 24  Discharge Date: 24    Post-Operative Orthopedic Assessment   Question Patient Response   How have you been doing since discharge? DOING WELL, BUT SORE   Are you able to eat/drink, ok? [x] Yes     [] No   Are you having any nausea/vomiting? [] Yes     [x] No   Are you having problems with your bowels, or passing gas? []Yes      [] No  [x] No Bowel Movement at this time   Are you able to get around and walk? [x] Yes     [] No   Do you have any pain? [x] Yes     [] No   Pain Rating: At Rest (0-10) 0/10   Pain Rating: While Active (0-10) 6/10   Location of pain LT HIP   Were your discharge instructions reviewed with you regarding wound care?  [x] Yes     [] No  [] Proper wound care reviewed with patient at this time.   Were your discharge instructions reviewed with you regarding prescribed medications? [x] Yes     [] No  [] Prescription Medication reviewed with patient at this time.     Were your follow up appointments reviewed with you? [x] Yes     [] No  [] Post op appointments reviewed with patient at this time.   Have you completed your first session of Physical Therapy?  [x] Yes     [] No     Discharge Disposition   Were you discharged home, or sent to a rehab facility? [x] Home     [] Rehab Facility  Rehab Location:   Who is your caregiver at this time? WIFE - MARC

## 2024-04-30 ENCOUNTER — TELEPHONE (OUTPATIENT)
Dept: ORTHOPEDIC SURGERY | Facility: CLINIC | Age: 79
End: 2024-04-30
Payer: MEDICARE

## 2024-04-30 ENCOUNTER — TREATMENT (OUTPATIENT)
Dept: PHYSICAL THERAPY | Facility: CLINIC | Age: 79
End: 2024-04-30
Payer: MEDICARE

## 2024-04-30 DIAGNOSIS — R29.898 HIP WEAKNESS: ICD-10-CM

## 2024-04-30 DIAGNOSIS — Z96.642 STATUS POST HIP REPLACEMENT, LEFT: Primary | ICD-10-CM

## 2024-04-30 DIAGNOSIS — Z74.09 IMPAIRED FUNCTIONAL MOBILITY, BALANCE, GAIT, AND ENDURANCE: ICD-10-CM

## 2024-04-30 PROCEDURE — 97110 THERAPEUTIC EXERCISES: CPT | Performed by: PHYSICAL THERAPIST

## 2024-04-30 PROCEDURE — 97112 NEUROMUSCULAR REEDUCATION: CPT | Performed by: PHYSICAL THERAPIST

## 2024-04-30 NOTE — PROGRESS NOTES
Physical Therapy Daily Treatment Note                  [address]  [place] [address]      Patient: Papito Salinas   : 1945  Diagnosis/ICD-10 Code:  Status post hip replacement, left [Z96.642]  Referring practitioner: Sumanth Durbin MD  Date of Initial Visit: Type: THERAPY  Noted: 2024  Today's Date: 2024  Patient seen for 2 sessions             Subjective   Papito Salinas reports: may have overdone it at home when doing exercises. Tried to work on moving the leg out to the side and up in the air but it was really challenging and felt pain in front of the hip like it got locked up. Been having more pain with walking since.     Objective   See Exercise, Manual, and Modality Logs for complete treatment.       Assessment/Plan  Pt educated on performing HEP only and trying to stay away from leg lifting that involved straining or forcing motions at this time. Started on assisted hip flexion to 90 deg with no increase in pain. Able to decrease anterior hip pain with treatment and ice modality.   Progress per Plan of Care and Progress strengthening /stabilization /functional activity           Timed:  Manual Therapy:         mins  30873;  Therapeutic Exercise:    18     mins  64170;     Neuromuscular Ean:    15    mins  63184;    Therapeutic Activity:          mins  87555;     Gait Training:           mins  28065;     Ultrasound:          mins  93829;    Electrical Stimulation:         mins  08026;  Iontophoresis          mins  95382    Untimed:  Electrical Stimulation:         mins  68150 ( );  Mechanical Traction:         mins  35595;   Fluidotherapy          mins  24456    Timed Treatment:   33   mins   Total Treatment:     41   mins        Ellyn Harrison PTA  Physical Therapist Assistant

## 2024-04-30 NOTE — TELEPHONE ENCOUNTER
Caller:  MARC LE    Relationship: [employer]    Best call back number:[phone]    What is your medical concern? PATIENT HAS A RAW SPOT WHERE THE BANDAGE HAS REMOVED SKIN     How long has this issue been going on? SINCE PATIENT SHOWERED AFTER PHYSICAL THERAPY TODAY    Is your provider already aware of this issue? NO    PATIENT SPOUSE HAS QUESTIONS ABOUT REDRESSING THE WOUND

## 2024-04-30 NOTE — TELEPHONE ENCOUNTER
Spoke with Jeannette and recommended he cover up the area with gauze or nonstick dressing and tape or use ACE wrap if able and see Idalia tomorrow in office.     Called pt's wife and they were agreeable to this; Appt made for 1:40pm.    Alonzo SUGGS CMA ([facility]), ROT

## 2024-04-30 NOTE — TELEPHONE ENCOUNTER
"Spoke to pt's wife and requested they send pictures of the incision.    She reports that there appears to be a \"bubble\" at the bottom of the incision and looks like one is also developing at the top of the incision; Reports the bottom \"bubble\" started to \"leak\" this morning but he proceeded with therapy but the therapist did not evaluate it at the appt. She states it looks like the skin was ripped off by the other adhesive tape/bandage that had been over the \"clear tape\" dressing that remains in place.    I told her I would show Jeannette, our PA here in the office, the picture and give her a call back. She understood.    Alonzo SUGGS CMA ([facility]), ROT        "

## 2024-05-01 ENCOUNTER — OFFICE VISIT (OUTPATIENT)
Dept: ORTHOPEDIC SURGERY | Facility: CLINIC | Age: 79
End: 2024-05-01
Payer: MEDICARE

## 2024-05-01 VITALS — TEMPERATURE: 96.8 F

## 2024-05-01 DIAGNOSIS — Z96.642 STATUS POST TOTAL HIP REPLACEMENT, LEFT: Primary | ICD-10-CM

## 2024-05-01 PROCEDURE — 99024 POSTOP FOLLOW-UP VISIT: CPT | Performed by: PHYSICIAN ASSISTANT

## 2024-05-01 PROCEDURE — 1160F RVW MEDS BY RX/DR IN RCRD: CPT | Performed by: PHYSICIAN ASSISTANT

## 2024-05-01 PROCEDURE — 1159F MED LIST DOCD IN RCRD: CPT | Performed by: PHYSICIAN ASSISTANT

## 2024-05-01 NOTE — PROGRESS NOTES
[facility] Orthopaedic Surgery Clinic Note        Subjective     CC: Post-op (8 days status post left total hip arthroplasty anterior modified (DOS 4/23/24) /) and Wound Check      HPI    Papito Salinas is a 78 y.o. male.  Patient returns early today for wound check.  He reports that the tape over his incision from surgery caused a blister which then decompressed medial to his incision.  His wife went to the store and got some no stick bandages and they have been placing this over the incision and the decompressed blister.  He reports that his hip is doing very well.  It is much better than it was prior to surgery.  He is doing physical therapy.  He is taking oxycodone for pain and skipping doses sometimes.        ROS:    Constiutional:Pt denies fever, chills, nausea, or vomiting.  MSK:as above        Objective      Past Medical History  Past Medical History:   Diagnosis Date    Arthritis     Edema     Lower extremities    GERD (gastroesophageal reflux disease)     History of transfusion     Hypertension     Insomnia     Plantar fascial fibromatosis     Tibia fracture     Left         Physical Exam  Temp 96.8 °F (36 °C)     There is no height or weight on file to calculate BMI.    Patient is well nourished and well developed.        Ortho Exam  Left hip exam: Anterior hip incision is healing well with intact Prineo.  There is a pea-sized unroofed blister medial to the mesh.  No sign of infection.  No drainage.  Patient has good strength of the hip.  Using a walker for ambulation.    Imaging/Labs/EMG Reviewed:  Imaging Results (Last 24 Hours)       ** No results found for the last 24 hours. **              Assessment    Assessment:  1. Status post total hip replacement, left        Plan:  Recommend over the counter anti-inflammatories for pain and/or swelling  Patient is doing status post left total hip arthroplasty with Dr. Durbin on fourth 1/23/2024.  The incision is healing well.  The unroofed decompressed  blister is not draining anything and shows no sign of infection.  I told him to continue with the nonstick dressing until it dries out so that it doesn't stick to clothing.  Once it is dry it is fine to leave it uncovered.  He will continue his PT and I encouraged him to do the exercises at home.  He will return as scheduled on 5/15/2024, sooner if needed.  They will call with any concerns prior to this appointment.      Idalia Castro PA-C  05/01/24  14:15 EDT

## 2024-05-02 ENCOUNTER — TREATMENT (OUTPATIENT)
Dept: PHYSICAL THERAPY | Facility: CLINIC | Age: 79
End: 2024-05-02
Payer: MEDICARE

## 2024-05-02 DIAGNOSIS — Z74.09 IMPAIRED FUNCTIONAL MOBILITY, BALANCE, GAIT, AND ENDURANCE: ICD-10-CM

## 2024-05-02 DIAGNOSIS — R29.898 HIP WEAKNESS: ICD-10-CM

## 2024-05-02 DIAGNOSIS — Z96.642 STATUS POST HIP REPLACEMENT, LEFT: Primary | ICD-10-CM

## 2024-05-02 PROCEDURE — 97530 THERAPEUTIC ACTIVITIES: CPT | Performed by: PHYSICAL THERAPIST

## 2024-05-02 PROCEDURE — 97110 THERAPEUTIC EXERCISES: CPT | Performed by: PHYSICAL THERAPIST

## 2024-05-02 PROCEDURE — 97112 NEUROMUSCULAR REEDUCATION: CPT | Performed by: PHYSICAL THERAPIST

## 2024-05-02 NOTE — PROGRESS NOTES
Physical Therapy Daily Treatment Note                  [address]  [place] [address]      Patient: Papito Salinas   : 1945  Diagnosis/ICD-10 Code:  Status post hip replacement, left [Z96.642]  Referring practitioner: Sumanth Durbin MD  Date of Initial Visit: Type: THERAPY  Noted: 2024  Today's Date: 2024  Patient seen for 3 sessions             Subjective   Papito Salinas reports: got pretty sore after the last session. Feeling better today and walking as much as he can around the house. Would like to use his pedal bike that sits on the floor at home. Wanted to us a small hand massager on hip as well. Had to go to the MD yesterday because the tape over the incision was starting to cause blisters and peeling. It was noticed after last session when taking a shower when taking bandages off. He is allergic to the tape used and they switched to gauze with sensitive tape away from blisters. This seems to be better and can leave nothing on it once the blister dries up.     Objective   See Exercise, Manual, and Modality Logs for complete treatment.       Assessment/Plan  Pt did well with NuStep today and may benefit from small bouts of pedaling on the ground bike at home every other day for movement as long as he does not have increase in pain. Pt did need assistance with hip abd/add in supine due to difficulty on the left side. Pt educated on precautions and to abstain from any object causing pressure around the incision site only and/or having to create movements that go against hip precautions. May be more sensitive from allergic reaction of the tape in the anterior hip.   Progress per Plan of Care and Progress strengthening /stabilization /functional activity           Timed:  Manual Therapy:         mins  20945;  Therapeutic Exercise:    20     mins  98055;     Neuromuscular Ean:    10    mins  61040;    Therapeutic Activity:    10      mins   70506;     Gait Training:           mins  42182;     Ultrasound:          mins  20496;    Electrical Stimulation:         mins  98102;  Iontophoresis          mins  39034    Untimed:  Electrical Stimulation:         mins  16710 ( );  Mechanical Traction:         mins  13578;   Fluidotherapy          mins  98682    Timed Treatment:   40   mins   Total Treatment:     40   mins        Ellyn Harrison PTA  Physical Therapist Assistant

## 2024-05-06 DIAGNOSIS — M10.00 IDIOPATHIC GOUT, UNSPECIFIED CHRONICITY, UNSPECIFIED SITE: ICD-10-CM

## 2024-05-06 RX ORDER — ALLOPURINOL 300 MG/1
300 TABLET ORAL DAILY
Qty: 90 TABLET | Refills: 3 | Status: ON HOLD | OUTPATIENT
Start: 2024-05-06

## 2024-05-07 ENCOUNTER — TREATMENT (OUTPATIENT)
Dept: PHYSICAL THERAPY | Facility: CLINIC | Age: 79
End: 2024-05-07
Payer: MEDICARE

## 2024-05-07 ENCOUNTER — TELEPHONE (OUTPATIENT)
Dept: ORTHOPEDIC SURGERY | Facility: CLINIC | Age: 79
End: 2024-05-07
Payer: MEDICARE

## 2024-05-07 DIAGNOSIS — Z96.642 STATUS POST HIP REPLACEMENT, LEFT: Primary | ICD-10-CM

## 2024-05-07 DIAGNOSIS — R29.898 HIP WEAKNESS: ICD-10-CM

## 2024-05-07 DIAGNOSIS — Z74.09 IMPAIRED FUNCTIONAL MOBILITY, BALANCE, GAIT, AND ENDURANCE: ICD-10-CM

## 2024-05-07 PROCEDURE — 97110 THERAPEUTIC EXERCISES: CPT | Performed by: PHYSICAL THERAPIST

## 2024-05-07 PROCEDURE — 97112 NEUROMUSCULAR REEDUCATION: CPT | Performed by: PHYSICAL THERAPIST

## 2024-05-07 RX ORDER — HYDROCODONE BITARTRATE AND ACETAMINOPHEN 5; 325 MG/1; MG/1
1 TABLET ORAL EVERY 6 HOURS PRN
Qty: 30 TABLET | Refills: 0 | Status: ON HOLD | OUTPATIENT
Start: 2024-05-07

## 2024-05-09 ENCOUNTER — TREATMENT (OUTPATIENT)
Dept: PHYSICAL THERAPY | Facility: CLINIC | Age: 79
End: 2024-05-09
Payer: MEDICARE

## 2024-05-09 ENCOUNTER — OFFICE VISIT (OUTPATIENT)
Dept: ORTHOPEDIC SURGERY | Facility: CLINIC | Age: 79
End: 2024-05-09
Payer: MEDICARE

## 2024-05-09 DIAGNOSIS — Z96.642 STATUS POST HIP REPLACEMENT, LEFT: Primary | ICD-10-CM

## 2024-05-09 DIAGNOSIS — R29.898 HIP WEAKNESS: ICD-10-CM

## 2024-05-09 DIAGNOSIS — Z74.09 IMPAIRED FUNCTIONAL MOBILITY, BALANCE, GAIT, AND ENDURANCE: ICD-10-CM

## 2024-05-09 DIAGNOSIS — Z96.642 STATUS POST TOTAL HIP REPLACEMENT, LEFT: Primary | ICD-10-CM

## 2024-05-09 PROCEDURE — 97110 THERAPEUTIC EXERCISES: CPT | Performed by: PHYSICAL THERAPIST

## 2024-05-09 PROCEDURE — 97112 NEUROMUSCULAR REEDUCATION: CPT | Performed by: PHYSICAL THERAPIST

## 2024-05-09 RX ORDER — CEPHALEXIN 500 MG/1
500 CAPSULE ORAL 3 TIMES DAILY
Qty: 30 CAPSULE | Refills: 0 | Status: ON HOLD | OUTPATIENT
Start: 2024-05-09 | End: 2024-05-19

## 2024-05-13 ENCOUNTER — ANESTHESIA EVENT (OUTPATIENT)
Dept: PERIOP | Facility: HOSPITAL | Age: 79
End: 2024-05-13
Payer: MEDICARE

## 2024-05-13 ENCOUNTER — PREP FOR SURGERY (OUTPATIENT)
Dept: OTHER | Facility: HOSPITAL | Age: 79
End: 2024-05-13
Payer: MEDICARE

## 2024-05-13 ENCOUNTER — APPOINTMENT (OUTPATIENT)
Dept: CARDIOLOGY | Facility: HOSPITAL | Age: 79
End: 2024-05-13
Payer: MEDICARE

## 2024-05-13 ENCOUNTER — OFFICE VISIT (OUTPATIENT)
Dept: ORTHOPEDIC SURGERY | Facility: CLINIC | Age: 79
End: 2024-05-13
Payer: MEDICARE

## 2024-05-13 ENCOUNTER — HOSPITAL ENCOUNTER (INPATIENT)
Facility: HOSPITAL | Age: 79
LOS: 2 days | Discharge: HOME | End: 2024-05-16
Attending: INTERNAL MEDICINE | Admitting: INTERNAL MEDICINE
Payer: MEDICARE

## 2024-05-13 DIAGNOSIS — Z96.642 STATUS POST TOTAL HIP REPLACEMENT, LEFT: ICD-10-CM

## 2024-05-13 DIAGNOSIS — M96.842 SEROMA OF MUSCULOSKELETAL STRUCTURE AFTER MUSCULOSKELETAL SYSTEM PROCEDURE: ICD-10-CM

## 2024-05-13 DIAGNOSIS — Z98.890 S/P DEBRIDEMENT: Primary | ICD-10-CM

## 2024-05-13 DIAGNOSIS — M96.842 SEROMA OF MUSCULOSKELETAL STRUCTURE AFTER MUSCULOSKELETAL SYSTEM PROCEDURE: Primary | ICD-10-CM

## 2024-05-13 DIAGNOSIS — Z96.642 STATUS POST TOTAL HIP REPLACEMENT, LEFT: Primary | ICD-10-CM

## 2024-05-13 PROBLEM — T84.52XA LEFT HIP PROSTHETIC JOINT INFECTION: Status: ACTIVE | Noted: 2024-05-13

## 2024-05-13 LAB
ANION GAP SERPL CALCULATED.3IONS-SCNC: 12 MMOL/L (ref 5–15)
BH CV LOWER VASCULAR LEFT COMMON FEMORAL AUGMENT: NORMAL
BH CV LOWER VASCULAR LEFT COMMON FEMORAL COMPRESS: NORMAL
BH CV LOWER VASCULAR LEFT COMMON FEMORAL PHASIC: NORMAL
BH CV LOWER VASCULAR LEFT COMMON FEMORAL SPONT: NORMAL
BH CV LOWER VASCULAR LEFT DISTAL FEMORAL AUGMENT: NORMAL
BH CV LOWER VASCULAR LEFT DISTAL FEMORAL COMPRESS: NORMAL
BH CV LOWER VASCULAR LEFT DISTAL FEMORAL PHASIC: NORMAL
BH CV LOWER VASCULAR LEFT DISTAL FEMORAL SPONT: NORMAL
BH CV LOWER VASCULAR LEFT GASTRONEMIUS COMPRESS: NORMAL
BH CV LOWER VASCULAR LEFT GREATER SAPH AK COMPRESS: NORMAL
BH CV LOWER VASCULAR LEFT GREATER SAPH BK COMPRESS: NORMAL
BH CV LOWER VASCULAR LEFT LESSER SAPH COMPRESS: NORMAL
BH CV LOWER VASCULAR LEFT MID FEMORAL AUGMENT: NORMAL
BH CV LOWER VASCULAR LEFT MID FEMORAL COMPRESS: NORMAL
BH CV LOWER VASCULAR LEFT MID FEMORAL PHASIC: NORMAL
BH CV LOWER VASCULAR LEFT MID FEMORAL SPONT: NORMAL
BH CV LOWER VASCULAR LEFT PERONEAL AUGMENT: NORMAL
BH CV LOWER VASCULAR LEFT PERONEAL COMPRESS: NORMAL
BH CV LOWER VASCULAR LEFT POPLITEAL AUGMENT: NORMAL
BH CV LOWER VASCULAR LEFT POPLITEAL COMPRESS: NORMAL
BH CV LOWER VASCULAR LEFT POPLITEAL PHASIC: NORMAL
BH CV LOWER VASCULAR LEFT POPLITEAL SPONT: NORMAL
BH CV LOWER VASCULAR LEFT POSTERIOR TIBIAL AUGMENT: NORMAL
BH CV LOWER VASCULAR LEFT POSTERIOR TIBIAL COMPRESS: NORMAL
BH CV LOWER VASCULAR LEFT PROFUNDA FEMORAL AUGMENT: NORMAL
BH CV LOWER VASCULAR LEFT PROFUNDA FEMORAL PHASIC: NORMAL
BH CV LOWER VASCULAR LEFT PROFUNDA FEMORAL SPONT: NORMAL
BH CV LOWER VASCULAR LEFT PROXIMAL FEMORAL AUGMENT: NORMAL
BH CV LOWER VASCULAR LEFT PROXIMAL FEMORAL COMPRESS: NORMAL
BH CV LOWER VASCULAR LEFT PROXIMAL FEMORAL PHASIC: NORMAL
BH CV LOWER VASCULAR LEFT PROXIMAL FEMORAL SPONT: NORMAL
BH CV LOWER VASCULAR LEFT SAPHENOFEMORAL JUNCTION AUGMENT: NORMAL
BH CV LOWER VASCULAR LEFT SAPHENOFEMORAL JUNCTION COMPRESS: NORMAL
BH CV LOWER VASCULAR LEFT SAPHENOFEMORAL JUNCTION PHASIC: NORMAL
BH CV LOWER VASCULAR LEFT SAPHENOFEMORAL JUNCTION SPONT: NORMAL
BH CV LOWER VASCULAR RIGHT COMMON FEMORAL AUGMENT: NORMAL
BH CV LOWER VASCULAR RIGHT COMMON FEMORAL COMPRESS: NORMAL
BH CV LOWER VASCULAR RIGHT COMMON FEMORAL PHASIC: NORMAL
BH CV LOWER VASCULAR RIGHT COMMON FEMORAL SPONT: NORMAL
BUN SERPL-MCNC: 10 MG/DL (ref 8–23)
BUN/CREAT SERPL: 10.5 (ref 7–25)
CALCIUM SPEC-SCNC: 8.4 MG/DL (ref 8.6–10.5)
CHLORIDE SERPL-SCNC: 102 MMOL/L (ref 98–107)
CO2 SERPL-SCNC: 24 MMOL/L (ref 22–29)
CREAT SERPL-MCNC: 0.95 MG/DL (ref 0.76–1.27)
CRP SERPL-MCNC: 3.93 MG/DL (ref 0–0.5)
D-LACTATE SERPL-SCNC: 1.6 MMOL/L (ref 0.5–2)
DEPRECATED RDW RBC AUTO: 41.8 FL (ref 37–54)
EGFRCR SERPLBLD CKD-EPI 2021: 82.4 ML/MIN/1.73
ERYTHROCYTE [DISTWIDTH] IN BLOOD BY AUTOMATED COUNT: 12.5 % (ref 12.3–15.4)
ERYTHROCYTE [SEDIMENTATION RATE] IN BLOOD: 50 MM/HR (ref 0–20)
GLUCOSE SERPL-MCNC: 118 MG/DL (ref 65–99)
HCT VFR BLD AUTO: 36.8 % (ref 37.5–51)
HGB BLD-MCNC: 12 G/DL (ref 13–17.7)
MCH RBC QN AUTO: 29.4 PG (ref 26.6–33)
MCHC RBC AUTO-ENTMCNC: 32.6 G/DL (ref 31.5–35.7)
MCV RBC AUTO: 90.2 FL (ref 79–97)
PLATELET # BLD AUTO: 313 10*3/MM3 (ref 140–450)
PMV BLD AUTO: 10 FL (ref 6–12)
POTASSIUM SERPL-SCNC: 3.7 MMOL/L (ref 3.5–5.2)
PROCALCITONIN SERPL-MCNC: 0.08 NG/ML (ref 0–0.25)
RBC # BLD AUTO: 4.08 10*6/MM3 (ref 4.14–5.8)
SODIUM SERPL-SCNC: 138 MMOL/L (ref 136–145)
WBC NRBC COR # BLD AUTO: 5.54 10*3/MM3 (ref 3.4–10.8)

## 2024-05-13 PROCEDURE — 1159F MED LIST DOCD IN RCRD: CPT | Performed by: PHYSICIAN ASSISTANT

## 2024-05-13 PROCEDURE — 1160F RVW MEDS BY RX/DR IN RCRD: CPT | Performed by: PHYSICIAN ASSISTANT

## 2024-05-13 PROCEDURE — 83605 ASSAY OF LACTIC ACID: CPT | Performed by: NURSE PRACTITIONER

## 2024-05-13 PROCEDURE — 93005 ELECTROCARDIOGRAM TRACING: CPT | Performed by: NURSE PRACTITIONER

## 2024-05-13 PROCEDURE — 85027 COMPLETE CBC AUTOMATED: CPT | Performed by: NURSE PRACTITIONER

## 2024-05-13 PROCEDURE — 80048 BASIC METABOLIC PNL TOTAL CA: CPT | Performed by: NURSE PRACTITIONER

## 2024-05-13 PROCEDURE — 85652 RBC SED RATE AUTOMATED: CPT | Performed by: NURSE PRACTITIONER

## 2024-05-13 PROCEDURE — G0378 HOSPITAL OBSERVATION PER HR: HCPCS

## 2024-05-13 PROCEDURE — 93971 EXTREMITY STUDY: CPT | Performed by: INTERNAL MEDICINE

## 2024-05-13 PROCEDURE — 87040 BLOOD CULTURE FOR BACTERIA: CPT | Performed by: NURSE PRACTITIONER

## 2024-05-13 PROCEDURE — 99024 POSTOP FOLLOW-UP VISIT: CPT | Performed by: PHYSICIAN ASSISTANT

## 2024-05-13 PROCEDURE — 93971 EXTREMITY STUDY: CPT

## 2024-05-13 PROCEDURE — 84145 PROCALCITONIN (PCT): CPT | Performed by: NURSE PRACTITIONER

## 2024-05-13 PROCEDURE — 86140 C-REACTIVE PROTEIN: CPT | Performed by: NURSE PRACTITIONER

## 2024-05-13 RX ORDER — SODIUM CHLORIDE 0.9 % (FLUSH) 0.9 %
10 SYRINGE (ML) INJECTION AS NEEDED
Status: DISCONTINUED | OUTPATIENT
Start: 2024-05-13 | End: 2024-05-16 | Stop reason: HOSPADM

## 2024-05-13 RX ORDER — MESALAMINE 1.2 G/1
1.2 TABLET, DELAYED RELEASE ORAL EVERY 12 HOURS SCHEDULED
Status: DISCONTINUED | OUTPATIENT
Start: 2024-05-13 | End: 2024-05-16 | Stop reason: HOSPADM

## 2024-05-13 RX ORDER — TRANEXAMIC ACID 10 MG/ML
1000 INJECTION, SOLUTION INTRAVENOUS ONCE
Status: CANCELLED | OUTPATIENT
Start: 2024-05-13 | End: 2024-05-13

## 2024-05-13 RX ORDER — LIDOCAINE HYDROCHLORIDE 10 MG/ML
0.5 INJECTION, SOLUTION EPIDURAL; INFILTRATION; INTRACAUDAL; PERINEURAL ONCE AS NEEDED
Status: CANCELLED | OUTPATIENT
Start: 2024-05-13

## 2024-05-13 RX ORDER — TERAZOSIN 5 MG/1
5 CAPSULE ORAL NIGHTLY
Status: DISCONTINUED | OUTPATIENT
Start: 2024-05-13 | End: 2024-05-16 | Stop reason: HOSPADM

## 2024-05-13 RX ORDER — BISACODYL 10 MG
10 SUPPOSITORY, RECTAL RECTAL DAILY PRN
Status: DISCONTINUED | OUTPATIENT
Start: 2024-05-13 | End: 2024-05-16 | Stop reason: HOSPADM

## 2024-05-13 RX ORDER — SODIUM CHLORIDE 0.9 % (FLUSH) 0.9 %
10 SYRINGE (ML) INJECTION EVERY 12 HOURS SCHEDULED
Status: DISCONTINUED | OUTPATIENT
Start: 2024-05-13 | End: 2024-05-16 | Stop reason: HOSPADM

## 2024-05-13 RX ORDER — SODIUM CHLORIDE 9 MG/ML
40 INJECTION, SOLUTION INTRAVENOUS AS NEEDED
Status: CANCELLED | OUTPATIENT
Start: 2024-05-13

## 2024-05-13 RX ORDER — SODIUM CHLORIDE 9 MG/ML
40 INJECTION, SOLUTION INTRAVENOUS AS NEEDED
Status: DISCONTINUED | OUTPATIENT
Start: 2024-05-13 | End: 2024-05-16 | Stop reason: HOSPADM

## 2024-05-13 RX ORDER — ONDANSETRON 2 MG/ML
4 INJECTION INTRAMUSCULAR; INTRAVENOUS EVERY 6 HOURS PRN
Status: DISCONTINUED | OUTPATIENT
Start: 2024-05-13 | End: 2024-05-16 | Stop reason: HOSPADM

## 2024-05-13 RX ORDER — SODIUM CHLORIDE 0.9 % (FLUSH) 0.9 %
10 SYRINGE (ML) INJECTION EVERY 12 HOURS SCHEDULED
Status: CANCELLED | OUTPATIENT
Start: 2024-05-13

## 2024-05-13 RX ORDER — POLYETHYLENE GLYCOL 3350 17 G/17G
17 POWDER, FOR SOLUTION ORAL DAILY PRN
Status: DISCONTINUED | OUTPATIENT
Start: 2024-05-13 | End: 2024-05-16 | Stop reason: HOSPADM

## 2024-05-13 RX ORDER — METOPROLOL SUCCINATE 100 MG/1
100 TABLET, EXTENDED RELEASE ORAL DAILY
Status: DISCONTINUED | OUTPATIENT
Start: 2024-05-13 | End: 2024-05-16 | Stop reason: HOSPADM

## 2024-05-13 RX ORDER — ALLOPURINOL 300 MG/1
300 TABLET ORAL DAILY
Status: DISCONTINUED | OUTPATIENT
Start: 2024-05-13 | End: 2024-05-16 | Stop reason: HOSPADM

## 2024-05-13 RX ORDER — AMITRIPTYLINE HYDROCHLORIDE 25 MG/1
25 TABLET, FILM COATED ORAL NIGHTLY PRN
Status: DISCONTINUED | OUTPATIENT
Start: 2024-05-13 | End: 2024-05-16 | Stop reason: HOSPADM

## 2024-05-13 RX ORDER — FAMOTIDINE 20 MG/1
20 TABLET, FILM COATED ORAL ONCE
Status: CANCELLED | OUTPATIENT
Start: 2024-05-13 | End: 2024-05-13

## 2024-05-13 RX ORDER — TEMAZEPAM 15 MG/1
15 CAPSULE ORAL NIGHTLY PRN
Status: DISCONTINUED | OUTPATIENT
Start: 2024-05-13 | End: 2024-05-16 | Stop reason: HOSPADM

## 2024-05-13 RX ORDER — LABETALOL HYDROCHLORIDE 5 MG/ML
10 INJECTION, SOLUTION INTRAVENOUS EVERY 4 HOURS PRN
Status: DISCONTINUED | OUTPATIENT
Start: 2024-05-13 | End: 2024-05-16 | Stop reason: HOSPADM

## 2024-05-13 RX ORDER — VALSARTAN 160 MG/1
160 TABLET ORAL DAILY
Status: DISCONTINUED | OUTPATIENT
Start: 2024-05-13 | End: 2024-05-16 | Stop reason: HOSPADM

## 2024-05-13 RX ORDER — AMOXICILLIN 250 MG
2 CAPSULE ORAL 2 TIMES DAILY PRN
Status: DISCONTINUED | OUTPATIENT
Start: 2024-05-13 | End: 2024-05-16 | Stop reason: HOSPADM

## 2024-05-13 RX ORDER — GABAPENTIN 300 MG/1
300 CAPSULE ORAL NIGHTLY
Status: DISCONTINUED | OUTPATIENT
Start: 2024-05-13 | End: 2024-05-16 | Stop reason: HOSPADM

## 2024-05-13 RX ORDER — FAMOTIDINE 10 MG/ML
20 INJECTION, SOLUTION INTRAVENOUS ONCE
Status: CANCELLED | OUTPATIENT
Start: 2024-05-13 | End: 2024-05-13

## 2024-05-13 RX ORDER — PANTOPRAZOLE SODIUM 40 MG/1
40 TABLET, DELAYED RELEASE ORAL
Status: DISCONTINUED | OUTPATIENT
Start: 2024-05-14 | End: 2024-05-16 | Stop reason: HOSPADM

## 2024-05-13 RX ORDER — BISACODYL 5 MG/1
5 TABLET, DELAYED RELEASE ORAL DAILY PRN
Status: DISCONTINUED | OUTPATIENT
Start: 2024-05-13 | End: 2024-05-16 | Stop reason: HOSPADM

## 2024-05-13 RX ADMIN — ALLOPURINOL 300 MG: 300 TABLET ORAL at 12:55

## 2024-05-13 RX ADMIN — Medication 10 ML: at 13:29

## 2024-05-13 RX ADMIN — TERAZOSIN HYDROCHLORIDE 5 MG: 5 CAPSULE ORAL at 20:07

## 2024-05-13 RX ADMIN — MESALAMINE 1.2 G: 1.2 TABLET, DELAYED RELEASE ORAL at 20:08

## 2024-05-13 RX ADMIN — GABAPENTIN 300 MG: 300 CAPSULE ORAL at 20:07

## 2024-05-13 RX ADMIN — TEMAZEPAM 15 MG: 15 CAPSULE ORAL at 20:07

## 2024-05-13 RX ADMIN — MESALAMINE 1.2 G: 1.2 TABLET, DELAYED RELEASE ORAL at 13:38

## 2024-05-13 RX ADMIN — METOPROLOL SUCCINATE 100 MG: 100 TABLET, EXTENDED RELEASE ORAL at 12:55

## 2024-05-13 RX ADMIN — VALSARTAN 160 MG: 160 TABLET, FILM COATED ORAL at 12:55

## 2024-05-13 RX ADMIN — Medication 10 ML: at 20:08

## 2024-05-13 RX ADMIN — Medication 10 MG: at 21:06

## 2024-05-13 NOTE — H&P
Patient Name: Papito Salinas  MRN: [id number]  : 1945  DOS: 2024    Attending: Fabi Pina MD    Primary Care Provider: Dick Brewster MD      Chief complaint:  Postop wound infection    Subjective   Patient is a pleasant 78 y.o. male presented direct admission from Dr. Durbin's office.  He had left total hip replacement on 2024.  He was seen today for postop visit.  He reports noting drainage from his incision on Friday and some erythema around his incision.  He was seen in clinic and placed on Keflex.  He continued to have increased drainage throughout the weekend.  He denies fevers, chills or night sweats.    When seen he is doing well.  He denies pain.  He denies nausea, shortness of breath or chest pain.  No history of DVT or PE.    Allergies:  Allergies   Allergen Reactions    Nsaids GI Bleeding     colitis    Penicillins Other (See Comments)     Fever spiked and out for 7days       Meds:  Medications Prior to Admission   Medication Sig Dispense Refill Last Dose    allopurinol (ZYLOPRIM) 300 MG tablet TAKE 1 TABLET BY MOUTH EVERY DAY 90 tablet 3 2024 at 1030    amitriptyline (ELAVIL) 25 MG tablet TAKE 1 TABLET BY MOUTH EVERY DAY AT NIGHT 90 tablet 3 2024 at 2300    aspirin (ASPIR) 81 MG EC tablet Take 1 tablet by mouth 2 (Two) Times a Day. 60 tablet 0 2024 at 2300    cephalexin (KEFLEX) 500 MG capsule Take 1 capsule by mouth 3 (Three) Times a Day for 10 days. 30 capsule 0 2024 at 0800    doxazosin (CARDURA) 4 MG tablet Take 1 tablet by mouth every night at bedtime. 90 tablet 3 2024 at 2300    gabapentin (NEURONTIN) 300 MG capsule TAKE 1 CAPSULE BY MOUTH EVERY NIGHT AT BEDTIME. 90 capsule 1 2024 at 2300    HYDROcodone-acetaminophen (NORCO) 5-325 MG per tablet Take 1 tablet by mouth Every 6 (Six) Hours As Needed for Moderate Pain. 30 tablet 0 2024 at 1600    mesalamine (LIALDA) 1.2 g EC tablet Take 1 tablet by mouth Every 12 (Twelve)  Hours.   5/12/2024 at 2300    metoprolol succinate XL (TOPROL-XL) 100 MG 24 hr tablet TAKE 1 TABLET BY MOUTH EVERY DAY 90 tablet 3 5/12/2024 at 1030    omeprazole (priLOSEC) 40 MG capsule Take 1 capsule by mouth Daily. 90 capsule 3 5/12/2024 at 1030    valsartan (DIOVAN) 160 MG tablet Take 1 tablet by mouth Daily. 90 tablet 3 5/12/2024 at 1030    [START ON 5/24/2024] aspirin 81 MG EC tablet Take 1 tablet by mouth Daily.       predniSONE (DELTASONE) 20 MG tablet Take 1 tablet by mouth Daily. Use only as needed (Patient taking differently: Take 1 tablet by mouth Daily As Needed. Use only as needed) 30 tablet 2     temazepam (RESTORIL) 15 MG capsule TAKE 1 CAPSULE BY MOUTH AT NIGHT AS NEEDED FOR SLEEP. 30 capsule 5          History:   Past Medical History:   Diagnosis Date    Arthritis     Edema     Lower extremities    GERD (gastroesophageal reflux disease)     History of transfusion     Hypertension     Insomnia     Plantar fascial fibromatosis     Tibia fracture     Left     Past Surgical History:   Procedure Laterality Date    CATARACT EXTRACTION Bilateral     COLONOSCOPY      TOTAL HIP ARTHROPLASTY Left 4/23/2024    Procedure: TOTAL HIP ARTHROPLASTY ANTERIOR MODIFIED LEFT;  Surgeon: Sumanth Durbin MD;  Location: [facility];  Service: Orthopedics;  Laterality: Left;    VEIN SURGERY Left     Vein Stripping     Family History   Problem Relation Age of Onset    Cancer Mother     Cancer Father      Social History     Tobacco Use    Smoking status: Former     Types: Cigarettes    Smokeless tobacco: Former     Types: Chew   Vaping Use    Vaping status: Never Used   Substance Use Topics    Alcohol use: Yes     Comment: occasional    Drug use: No   He is  with 4 children.  He is retired mailman.    Review of Systems  Pertinent items are noted in HPI    Vital Signs  /82   Pulse 73   SpO2 96%     Physical Exam:    General Appearance:    Alert, cooperative, in no acute distress   Head:    Normocephalic,  without obvious abnormality, atraumatic   Eyes:            Lids and lashes normal, conjunctivae and sclerae normal, no   icterus, no pallor, corneas clear,    Ears:    Ears appear intact with no abnormalities noted   Throat:   No oral lesions, no thrush, oral mucosa moist   Neck:   No adenopathy, supple, trachea midline, no thyromegaly    Lungs:     Clear to auscultation,respirations regular, even and unlabored    Heart:    Regular rhythm and normal rate, normal S1 and S2, no murmur, no gallop   Abdomen:     Normal bowel sounds, no masses, no organomegaly, soft non-tender, non-distended, no guarding, no rebound  tenderness   Genitalia:    Deferred   Extremities: Bulky dressing left hip CDI   Pulses:   Pulses palpable and equal bilaterally   Skin:   No bleeding, bruising or rash   Neurologic:   Cranial nerves 2 - 12 grossly intact. Flexion and dorsiflexion intact bilateral feet.        I reviewed the patient's new clinical results.     Lab Results   Component Value Date    HGBA1C 5.00 04/09/2024         Assessment and Plan:     Left hip prosthetic joint infection    Status post total replacement of left hip 4/23/24    Gout    Esophageal reflux    HTN (hypertension)    Obesity (BMI 30-39.9)    Seroma of musculoskeletal structure after musculoskeletal system procedure      Plan  Direct admit from Dr. Durbin's office for left hip postop wound infection.  Per Dr. Durbin he will be taken to the operating room tomorrow for washout.  Antibiotics postoperatively, possible [facility] consult.  N.p.o. after midnight.  Labs pending CBC, BMP, sed rate, procalcitonin and CRP, lactic acid and blood cultures.    1. PT/OT- WBAT LLE  2. Pain control-prns   3. IS-encourage  4. DVT proph- Mechs  5. Bowel regimen  6. Resume home medications as appropriate  7. Monitor post-op labs  8. DC planning for home    HTN   - Continue home Diovan, Hytrin and Toprol  - Monitor BP   - Holding parameters for BP meds  - Labetalol PRN for  SBP>170    Gout  -Continue home allopurinol    GERD  -Continue home PPI      Mariah Mckeon, APRN  05/13/24  12:49 EDT

## 2024-05-13 NOTE — PROGRESS NOTES
[facility] Orthopaedic Surgery Clinic Note        Subjective     Post-op (4 day postop; 2 week status post left total hip arthroplasty anterior modified (DOS 4/23/24) )       JORGE LUIS Salinas is a 78 y.o. male.  Patient returns today for follow-up of his left hip.  Status post NAVNEET who noted drainage from his incision on Friday.  He was seen in the clinic and placed on Keflex.  OptiForm dressing was placed over the incision.  Patient was brought back in today for follow-up evaluation.  He has continued to have increasing drainage although reports that the redness to the incision has improved.    Patient is using a walker to assist with ambulation.  Pain scale: 3/10.     Denies any fever.  Notes an occasional chill.  No nausea no vomiting or other constitutional symptoms.        Objective      Physical Exam  There were no vitals taken for this visit.    There is no height or weight on file to calculate BMI.        Ortho Exam  Integument:   Left hip: OptiFoam dressing removed, completely saturated.  Incision with surrounding erythema but according to the patient this has improved since Friday.  Positive serosanguineous drainage appreciable especially from the superior aspect of the incision.    Lower Extremity:   Left hip:    Tenderness:  Minimal pain predominantly over the incision    Swelling:  Mild    Crepitus:  None    Range of motion:  External Rotation: 30°       Internal Rotation: 30°       Flexion:  100°       Extension:  0°        Imaging Reviewed and Interpreted:  No new imaging today.      Assessment:  1. Status post total hip replacement, left    2. Seroma of musculoskeletal structure after musculoskeletal system procedure        Plan:  Status post left NAVNEET, stable.  Postoperative seroma/hematoma.  Area was redressed this morning after being cleaned.  Patient will be admitted under Dr. OCHOA.    Dr. Durbin will take to the OR tomorrow (5/14/2024) for I&D to include possible poly exchange.  Questions and  concerns answered.    Patient was also examined by Dr. Durbin and he agrees with the above assessment and plan.      Raina Liang PA-C  05/13/24  09:23 EDT      Dictated Utilizing Dragon Dictation.

## 2024-05-14 ENCOUNTER — APPOINTMENT (OUTPATIENT)
Dept: GENERAL RADIOLOGY | Facility: HOSPITAL | Age: 79
End: 2024-05-14
Payer: MEDICARE

## 2024-05-14 ENCOUNTER — ANESTHESIA (OUTPATIENT)
Dept: PERIOP | Facility: HOSPITAL | Age: 79
End: 2024-05-14
Payer: MEDICARE

## 2024-05-14 LAB — GLUCOSE BLDC GLUCOMTR-MCNC: 150 MG/DL (ref 70–130)

## 2024-05-14 PROCEDURE — 25010000002 VANCOMYCIN 1 G RECONSTITUTED SOLUTION: Performed by: ORTHOPAEDIC SURGERY

## 2024-05-14 PROCEDURE — 87102 FUNGUS ISOLATION CULTURE: CPT | Performed by: ORTHOPAEDIC SURGERY

## 2024-05-14 PROCEDURE — C1776 JOINT DEVICE (IMPLANTABLE): HCPCS | Performed by: ORTHOPAEDIC SURGERY

## 2024-05-14 PROCEDURE — 76000 FLUOROSCOPY <1 HR PHYS/QHP: CPT

## 2024-05-14 PROCEDURE — 87070 CULTURE OTHR SPECIMN AEROBIC: CPT | Performed by: ORTHOPAEDIC SURGERY

## 2024-05-14 PROCEDURE — 25010000002 HYDROMORPHONE PER 4 MG

## 2024-05-14 PROCEDURE — 25010000002 ROPIVACAINE PER 1 MG: Performed by: ORTHOPAEDIC SURGERY

## 2024-05-14 PROCEDURE — 25810000003 LACTATED RINGERS PER 1000 ML: Performed by: ANESTHESIOLOGY

## 2024-05-14 PROCEDURE — 87186 SC STD MICRODIL/AGAR DIL: CPT | Performed by: ORTHOPAEDIC SURGERY

## 2024-05-14 PROCEDURE — 0SRS0JA REPLACEMENT OF LEFT HIP JOINT, FEMORAL SURFACE WITH SYNTHETIC SUBSTITUTE, UNCEMENTED, OPEN APPROACH: ICD-10-PCS | Performed by: ORTHOPAEDIC SURGERY

## 2024-05-14 PROCEDURE — 27134 REVISE HIP JOINT REPLACEMENT: CPT | Performed by: ORTHOPAEDIC SURGERY

## 2024-05-14 PROCEDURE — 87206 SMEAR FLUORESCENT/ACID STAI: CPT | Performed by: ORTHOPAEDIC SURGERY

## 2024-05-14 PROCEDURE — 0SUB09Z SUPPLEMENT LEFT HIP JOINT WITH LINER, OPEN APPROACH: ICD-10-PCS | Performed by: ORTHOPAEDIC SURGERY

## 2024-05-14 PROCEDURE — 25010000002 CEFAZOLIN PER 500 MG

## 2024-05-14 PROCEDURE — 0SPS0JZ REMOVAL OF SYNTHETIC SUBSTITUTE FROM LEFT HIP JOINT, FEMORAL SURFACE, OPEN APPROACH: ICD-10-PCS | Performed by: ORTHOPAEDIC SURGERY

## 2024-05-14 PROCEDURE — 27134 REVISE HIP JOINT REPLACEMENT: CPT | Performed by: PHYSICIAN ASSISTANT

## 2024-05-14 PROCEDURE — 87205 SMEAR GRAM STAIN: CPT | Performed by: ORTHOPAEDIC SURGERY

## 2024-05-14 PROCEDURE — 73502 X-RAY EXAM HIP UNI 2-3 VIEWS: CPT

## 2024-05-14 PROCEDURE — 87077 CULTURE AEROBIC IDENTIFY: CPT | Performed by: ORTHOPAEDIC SURGERY

## 2024-05-14 PROCEDURE — 25010000002 ONDANSETRON PER 1 MG: Performed by: ANESTHESIOLOGY

## 2024-05-14 PROCEDURE — 87116 MYCOBACTERIA CULTURE: CPT | Performed by: ORTHOPAEDIC SURGERY

## 2024-05-14 PROCEDURE — 25010000002 FENTANYL CITRATE (PF) 100 MCG/2ML SOLUTION

## 2024-05-14 PROCEDURE — 25010000002 DEXAMETHASONE PER 1 MG

## 2024-05-14 PROCEDURE — 82948 REAGENT STRIP/BLOOD GLUCOSE: CPT

## 2024-05-14 PROCEDURE — 99024 POSTOP FOLLOW-UP VISIT: CPT | Performed by: ORTHOPAEDIC SURGERY

## 2024-05-14 PROCEDURE — 87075 CULTR BACTERIA EXCEPT BLOOD: CPT | Performed by: ORTHOPAEDIC SURGERY

## 2024-05-14 PROCEDURE — 76000 FLUOROSCOPY <1 HR PHYS/QHP: CPT | Performed by: ORTHOPAEDIC SURGERY

## 2024-05-14 PROCEDURE — 25010000002 DIPHENHYDRAMINE PER 50 MG

## 2024-05-14 PROCEDURE — 25810000003 SODIUM CHLORIDE 0.9 % SOLUTION: Performed by: ORTHOPAEDIC SURGERY

## 2024-05-14 PROCEDURE — 25010000002 PROPOFOL 10 MG/ML EMULSION

## 2024-05-14 PROCEDURE — 25010000002 CEFAZOLIN PER 500 MG: Performed by: ORTHOPAEDIC SURGERY

## 2024-05-14 PROCEDURE — 0SPB09Z REMOVAL OF LINER FROM LEFT HIP JOINT, OPEN APPROACH: ICD-10-PCS | Performed by: ORTHOPAEDIC SURGERY

## 2024-05-14 DEVICE — DEV CONTRL TISS STRATAFIX SYMM PDS PLUS VIL CT-1 45CM: Type: IMPLANTABLE DEVICE | Site: HIP | Status: FUNCTIONAL

## 2024-05-14 DEVICE — R3 0 DEGREE XLPE ACETABULAR LINER                                    36MM INNER DIAMETER X OUTER DIAMETER 60MM
Type: IMPLANTABLE DEVICE | Site: HIP | Status: FUNCTIONAL
Brand: R3

## 2024-05-14 DEVICE — DEV CONTRL TISS STRATAFIX SPIRAL MNCRYL UD 3/0 PLS 60CM: Type: IMPLANTABLE DEVICE | Site: HIP | Status: FUNCTIONAL

## 2024-05-14 DEVICE — OXINIUM FEMORAL HEAD 12/14 TAPER                                    36 MM M/+4
Type: IMPLANTABLE DEVICE | Site: HIP | Status: FUNCTIONAL
Brand: OXINIUM

## 2024-05-14 RX ORDER — VANCOMYCIN HYDROCHLORIDE 1 G/20ML
INJECTION, POWDER, LYOPHILIZED, FOR SOLUTION INTRAVENOUS AS NEEDED
Status: DISCONTINUED | OUTPATIENT
Start: 2024-05-14 | End: 2024-05-14 | Stop reason: HOSPADM

## 2024-05-14 RX ORDER — LIDOCAINE HYDROCHLORIDE 10 MG/ML
INJECTION, SOLUTION EPIDURAL; INFILTRATION; INTRACAUDAL; PERINEURAL AS NEEDED
Status: DISCONTINUED | OUTPATIENT
Start: 2024-05-14 | End: 2024-05-14 | Stop reason: SURG

## 2024-05-14 RX ORDER — HYDROCODONE BITARTRATE AND ACETAMINOPHEN 5; 325 MG/1; MG/1
1 TABLET ORAL EVERY 4 HOURS PRN
Status: DISCONTINUED | OUTPATIENT
Start: 2024-05-14 | End: 2024-05-16 | Stop reason: HOSPADM

## 2024-05-14 RX ORDER — NALOXONE HCL 0.4 MG/ML
0.4 VIAL (ML) INJECTION
Status: DISCONTINUED | OUTPATIENT
Start: 2024-05-14 | End: 2024-05-16 | Stop reason: HOSPADM

## 2024-05-14 RX ORDER — DIPHENHYDRAMINE HYDROCHLORIDE 50 MG/ML
12.5 INJECTION INTRAMUSCULAR; INTRAVENOUS ONCE
Status: COMPLETED | OUTPATIENT
Start: 2024-05-14 | End: 2024-05-14

## 2024-05-14 RX ORDER — SODIUM CHLORIDE 0.9 % (FLUSH) 0.9 %
10 SYRINGE (ML) INJECTION AS NEEDED
Status: DISCONTINUED | OUTPATIENT
Start: 2024-05-14 | End: 2024-05-14 | Stop reason: HOSPADM

## 2024-05-14 RX ORDER — FENTANYL CITRATE 50 UG/ML
50 INJECTION, SOLUTION INTRAMUSCULAR; INTRAVENOUS
Status: DISCONTINUED | OUTPATIENT
Start: 2024-05-14 | End: 2024-05-14 | Stop reason: HOSPADM

## 2024-05-14 RX ORDER — PHENYLEPHRINE HCL IN 0.9% NACL 1 MG/10 ML
SYRINGE (ML) INTRAVENOUS AS NEEDED
Status: DISCONTINUED | OUTPATIENT
Start: 2024-05-14 | End: 2024-05-14 | Stop reason: SURG

## 2024-05-14 RX ORDER — ASPIRIN 81 MG/1
81 TABLET ORAL EVERY 12 HOURS SCHEDULED
Status: DISCONTINUED | OUTPATIENT
Start: 2024-05-15 | End: 2024-05-16 | Stop reason: HOSPADM

## 2024-05-14 RX ORDER — SODIUM CHLORIDE 0.9 % (FLUSH) 0.9 %
1-10 SYRINGE (ML) INJECTION AS NEEDED
Status: DISCONTINUED | OUTPATIENT
Start: 2024-05-14 | End: 2024-05-16 | Stop reason: HOSPADM

## 2024-05-14 RX ORDER — HYDROMORPHONE HYDROCHLORIDE 1 MG/ML
0.5 INJECTION, SOLUTION INTRAMUSCULAR; INTRAVENOUS; SUBCUTANEOUS
Status: DISCONTINUED | OUTPATIENT
Start: 2024-05-14 | End: 2024-05-16 | Stop reason: HOSPADM

## 2024-05-14 RX ORDER — DEXAMETHASONE SODIUM PHOSPHATE 4 MG/ML
INJECTION, SOLUTION INTRA-ARTICULAR; INTRALESIONAL; INTRAMUSCULAR; INTRAVENOUS; SOFT TISSUE AS NEEDED
Status: DISCONTINUED | OUTPATIENT
Start: 2024-05-14 | End: 2024-05-14 | Stop reason: SURG

## 2024-05-14 RX ORDER — SODIUM CHLORIDE 0.9 % (FLUSH) 0.9 %
10 SYRINGE (ML) INJECTION EVERY 12 HOURS SCHEDULED
Status: DISCONTINUED | OUTPATIENT
Start: 2024-05-14 | End: 2024-05-16 | Stop reason: HOSPADM

## 2024-05-14 RX ORDER — SODIUM CHLORIDE, SODIUM LACTATE, POTASSIUM CHLORIDE, CALCIUM CHLORIDE 600; 310; 30; 20 MG/100ML; MG/100ML; MG/100ML; MG/100ML
9 INJECTION, SOLUTION INTRAVENOUS CONTINUOUS
Status: DISCONTINUED | OUTPATIENT
Start: 2024-05-14 | End: 2024-05-14

## 2024-05-14 RX ORDER — TRANEXAMIC ACID 10 MG/ML
1000 INJECTION, SOLUTION INTRAVENOUS ONCE
Status: COMPLETED | OUTPATIENT
Start: 2024-05-14 | End: 2024-05-14

## 2024-05-14 RX ORDER — ONDANSETRON 2 MG/ML
INJECTION INTRAMUSCULAR; INTRAVENOUS AS NEEDED
Status: DISCONTINUED | OUTPATIENT
Start: 2024-05-14 | End: 2024-05-14 | Stop reason: SURG

## 2024-05-14 RX ORDER — HYDROMORPHONE HYDROCHLORIDE 2 MG/ML
INJECTION, SOLUTION INTRAMUSCULAR; INTRAVENOUS; SUBCUTANEOUS AS NEEDED
Status: DISCONTINUED | OUTPATIENT
Start: 2024-05-14 | End: 2024-05-14 | Stop reason: SURG

## 2024-05-14 RX ORDER — MIDAZOLAM HYDROCHLORIDE 1 MG/ML
0.5 INJECTION INTRAMUSCULAR; INTRAVENOUS
Status: DISCONTINUED | OUTPATIENT
Start: 2024-05-14 | End: 2024-05-14 | Stop reason: HOSPADM

## 2024-05-14 RX ORDER — MAGNESIUM HYDROXIDE 1200 MG/15ML
LIQUID ORAL AS NEEDED
Status: DISCONTINUED | OUTPATIENT
Start: 2024-05-14 | End: 2024-05-14 | Stop reason: HOSPADM

## 2024-05-14 RX ORDER — FENTANYL CITRATE 50 UG/ML
INJECTION, SOLUTION INTRAMUSCULAR; INTRAVENOUS AS NEEDED
Status: DISCONTINUED | OUTPATIENT
Start: 2024-05-14 | End: 2024-05-14 | Stop reason: SURG

## 2024-05-14 RX ORDER — ACETAMINOPHEN 500 MG
1000 TABLET ORAL EVERY 6 HOURS
Status: DISCONTINUED | OUTPATIENT
Start: 2024-05-14 | End: 2024-05-16 | Stop reason: HOSPADM

## 2024-05-14 RX ORDER — SODIUM CHLORIDE 9 MG/ML
120 INJECTION, SOLUTION INTRAVENOUS CONTINUOUS
Status: DISCONTINUED | OUTPATIENT
Start: 2024-05-14 | End: 2024-05-16 | Stop reason: HOSPADM

## 2024-05-14 RX ORDER — CEFAZOLIN SODIUM 1 G/3ML
INJECTION, POWDER, FOR SOLUTION INTRAMUSCULAR; INTRAVENOUS AS NEEDED
Status: DISCONTINUED | OUTPATIENT
Start: 2024-05-14 | End: 2024-05-14 | Stop reason: SURG

## 2024-05-14 RX ORDER — HYDROMORPHONE HYDROCHLORIDE 1 MG/ML
0.5 INJECTION, SOLUTION INTRAMUSCULAR; INTRAVENOUS; SUBCUTANEOUS
Status: DISCONTINUED | OUTPATIENT
Start: 2024-05-14 | End: 2024-05-14 | Stop reason: HOSPADM

## 2024-05-14 RX ORDER — ONDANSETRON 2 MG/ML
4 INJECTION INTRAMUSCULAR; INTRAVENOUS EVERY 6 HOURS PRN
Status: DISCONTINUED | OUTPATIENT
Start: 2024-05-14 | End: 2024-05-16 | Stop reason: HOSPADM

## 2024-05-14 RX ORDER — NALOXONE HCL 0.4 MG/ML
0.1 VIAL (ML) INJECTION
Status: DISCONTINUED | OUTPATIENT
Start: 2024-05-14 | End: 2024-05-16 | Stop reason: HOSPADM

## 2024-05-14 RX ORDER — ROPIVACAINE HYDROCHLORIDE 5 MG/ML
INJECTION, SOLUTION EPIDURAL; INFILTRATION; PERINEURAL AS NEEDED
Status: DISCONTINUED | OUTPATIENT
Start: 2024-05-14 | End: 2024-05-14 | Stop reason: HOSPADM

## 2024-05-14 RX ORDER — DIPHENHYDRAMINE HYDROCHLORIDE 50 MG/ML
INJECTION INTRAMUSCULAR; INTRAVENOUS
Status: COMPLETED
Start: 2024-05-14 | End: 2024-05-14

## 2024-05-14 RX ORDER — DROPERIDOL 2.5 MG/ML
0.62 INJECTION, SOLUTION INTRAMUSCULAR; INTRAVENOUS ONCE AS NEEDED
Status: DISCONTINUED | OUTPATIENT
Start: 2024-05-14 | End: 2024-05-14 | Stop reason: HOSPADM

## 2024-05-14 RX ORDER — ONDANSETRON 4 MG/1
4 TABLET, ORALLY DISINTEGRATING ORAL EVERY 6 HOURS PRN
Status: DISCONTINUED | OUTPATIENT
Start: 2024-05-14 | End: 2024-05-16 | Stop reason: HOSPADM

## 2024-05-14 RX ORDER — SODIUM CHLORIDE 9 MG/ML
40 INJECTION, SOLUTION INTRAVENOUS AS NEEDED
Status: DISCONTINUED | OUTPATIENT
Start: 2024-05-14 | End: 2024-05-16 | Stop reason: HOSPADM

## 2024-05-14 RX ORDER — PROPOFOL 10 MG/ML
VIAL (ML) INTRAVENOUS AS NEEDED
Status: DISCONTINUED | OUTPATIENT
Start: 2024-05-14 | End: 2024-05-14 | Stop reason: SURG

## 2024-05-14 RX ADMIN — Medication 10 ML: at 09:05

## 2024-05-14 RX ADMIN — HYDROMORPHONE HYDROCHLORIDE 0.5 MG: 2 INJECTION, SOLUTION INTRAMUSCULAR; INTRAVENOUS; SUBCUTANEOUS at 16:12

## 2024-05-14 RX ADMIN — FENTANYL CITRATE 100 MCG: 50 INJECTION, SOLUTION INTRAMUSCULAR; INTRAVENOUS at 15:21

## 2024-05-14 RX ADMIN — PROPOFOL 300 MG: 10 INJECTION, EMULSION INTRAVENOUS at 15:10

## 2024-05-14 RX ADMIN — Medication 100 MCG: at 17:23

## 2024-05-14 RX ADMIN — LIDOCAINE HYDROCHLORIDE 100 MG: 10 INJECTION, SOLUTION EPIDURAL; INFILTRATION; INTRACAUDAL; PERINEURAL at 15:10

## 2024-05-14 RX ADMIN — Medication 10 ML: at 20:24

## 2024-05-14 RX ADMIN — ONDANSETRON 4 MG: 2 INJECTION INTRAMUSCULAR; INTRAVENOUS at 17:02

## 2024-05-14 RX ADMIN — ACETAMINOPHEN 1000 MG: 500 TABLET ORAL at 20:21

## 2024-05-14 RX ADMIN — Medication 100 MCG: at 16:50

## 2024-05-14 RX ADMIN — TRANEXAMIC ACID 1000 MG: 10 INJECTION, SOLUTION INTRAVENOUS at 15:16

## 2024-05-14 RX ADMIN — Medication 100 MCG: at 16:44

## 2024-05-14 RX ADMIN — SODIUM CHLORIDE, POTASSIUM CHLORIDE, SODIUM LACTATE AND CALCIUM CHLORIDE 9 ML/HR: 600; 310; 30; 20 INJECTION, SOLUTION INTRAVENOUS at 13:22

## 2024-05-14 RX ADMIN — MESALAMINE 1.2 G: 1.2 TABLET, DELAYED RELEASE ORAL at 20:21

## 2024-05-14 RX ADMIN — Medication 10 ML: at 13:22

## 2024-05-14 RX ADMIN — HYDROMORPHONE HYDROCHLORIDE 0.5 MG: 2 INJECTION, SOLUTION INTRAMUSCULAR; INTRAVENOUS; SUBCUTANEOUS at 16:44

## 2024-05-14 RX ADMIN — TRANEXAMIC ACID 1000 MG: 10 INJECTION, SOLUTION INTRAVENOUS at 16:52

## 2024-05-14 RX ADMIN — DEXAMETHASONE SODIUM PHOSPHATE 4 MG: 4 INJECTION INTRA-ARTICULAR; INTRALESIONAL; INTRAMUSCULAR; INTRAVENOUS; SOFT TISSUE at 15:14

## 2024-05-14 RX ADMIN — HYDROMORPHONE HYDROCHLORIDE 0.5 MG: 2 INJECTION, SOLUTION INTRAMUSCULAR; INTRAVENOUS; SUBCUTANEOUS at 15:42

## 2024-05-14 RX ADMIN — FENTANYL CITRATE 100 MCG: 50 INJECTION, SOLUTION INTRAMUSCULAR; INTRAVENOUS at 15:10

## 2024-05-14 RX ADMIN — DIPHENHYDRAMINE HYDROCHLORIDE 12.5 MG: 50 INJECTION INTRAMUSCULAR; INTRAVENOUS at 18:27

## 2024-05-14 RX ADMIN — Medication 100 MCG: at 17:00

## 2024-05-14 RX ADMIN — GABAPENTIN 300 MG: 300 CAPSULE ORAL at 20:21

## 2024-05-14 RX ADMIN — PROPOFOL 30 MG: 10 INJECTION, EMULSION INTRAVENOUS at 16:01

## 2024-05-14 RX ADMIN — TERAZOSIN HYDROCHLORIDE 5 MG: 5 CAPSULE ORAL at 20:21

## 2024-05-14 RX ADMIN — SODIUM CHLORIDE 120 ML/HR: 9 INJECTION, SOLUTION INTRAVENOUS at 20:24

## 2024-05-14 RX ADMIN — SODIUM CHLORIDE 2 G: 900 INJECTION INTRAVENOUS at 20:20

## 2024-05-14 RX ADMIN — METOPROLOL SUCCINATE 100 MG: 100 TABLET, EXTENDED RELEASE ORAL at 09:03

## 2024-05-14 RX ADMIN — Medication 100 MCG: at 17:15

## 2024-05-14 RX ADMIN — HYDROMORPHONE HYDROCHLORIDE 0.5 MG: 2 INJECTION, SOLUTION INTRAMUSCULAR; INTRAVENOUS; SUBCUTANEOUS at 15:58

## 2024-05-14 RX ADMIN — PANTOPRAZOLE SODIUM 40 MG: 40 TABLET, DELAYED RELEASE ORAL at 06:04

## 2024-05-14 RX ADMIN — CEFAZOLIN 2 G: 1 INJECTION, POWDER, FOR SOLUTION INTRAMUSCULAR; INTRAVENOUS at 15:36

## 2024-05-14 RX ADMIN — Medication 10 ML: at 20:23

## 2024-05-14 NOTE — OP NOTE
DATE OF PROCEDURE:  05/14/24    PREOPERATIVE DIAGNOSIS: Left hip seroma/hematoma, status post left total hip arthroplasty, with possible superficial versus deep infection    POSTOPERATIVE DIAGNOSIS: Left hip seroma/hematoma, status post left total hip arthroplasty, with possible superficial versus deep infection    PROCEDURE PERFORMED: Incision and drainage, with irrigation and debridement, left total hip arthroplasty wound, with head and liner exchange    Surgical Approach: Hip Modified Anterior (Quan-Dela Cruz)    IMPLANTS: Neutral liner for 60 To accommodate a 36 head, with +4 x 36 Oxinium head    SURGEON: Sumanth Durbin MD    ASSISTANT: Mirella Moore PA-C  (Mirella Moore PA-C was present and necessary for positioning, draping, retraction, instrumentation and closure.)    SPECIMENS: None    IMPLANTS:   Implant Name Type Inv. Item Serial No.  Lot No. LRB No. Used Action   DEV CONTRL TISS STRATAFIX SYMM PDS PLUS ELISHA CT-1 45CM - [id number] Implant DEV CONTRL TISS STRATAFIX SYMM PDS PLUS ELISHA CT-1 45CM  [employer]  DIV [employer] J [employer] J [id number] Left 1 Implanted   DEV CONTRL TISS STRATAFIX SPIRAL MNCRYL UD 3/0 PLS 60CM - [id number] Implant DEV CONTRL TISS STRATAFIX SPIRAL MNCRYL UD 3/0 PLS 60CM  [employer]O [employer]  DIV OF J AND J [id number] Left 1 Implanted   LINER ACET R3 XLPE 0D 12I20UM - [id number] Implant LINER ACET R3 XLPE 0D [id number]  [employer] [id number] Left 1 Implanted   HD FEM/HIP OXINIUM TPR 12/14 36MM PLS4 - [id number] Implant HD FEM/HIP OXINIUM TPR 12/14 36MM PLS4  [employer] [id number] Left 1 Implanted         ANESTHESIA:  Choice    STAFF:  Circulator: Gee Kay RN; Lisset Ivy RN  Scrub Person: Joel Montgomery  Vendor Representative: Hector Morillo ([employer])  Nursing Assistant: Leia Guerrero  Assistant: Mirella Moore PA-C    ESTIMATED BLOOD LOSS: 100 mL     COMPLICATIONS: None    PREOPERATIVE ANTIBIOTICS: None, however intraoperative Ancef administered after  cultures obtained    INDICATIONS: The patient is a 78 y.o. male status post left total hip arthroplasty on 4/23/2024 who was doing well up until 5 9/2/2024 when he noticed redness and small amount of drainage from the incision which subsequently worsened.  Options were discussed and he wished to proceed with irrigation and debridement of the wound with exploration and head and liner exchange if indicated.  Of note, he had no fevers, chills or night sweats.  He was ambulating well with the aid of a walker with minimal hip pain.  The possibility of infection has been discussed, which could be superficial or deep.  Plan to obtain intraoperative cultures.  Patient consented for the procedure.    DESCRIPTION OF PROCEDURE: The patient was positively identified in the preoperative holding area, brought to the operative suite, and placed in a supine position. After adequate general anesthetic had been achieved, the patient was placed in the supine position with a well padded folded blanket bolster under the left flank area, leaving the buttock free. After sterile prep and drape of the left hip and lower extremity, as well as draping the non-operative extremity to allow access for limb length assessment, a timeout procedure was performed to confirm the operative site, as well as the other parameters.     After placing a bump under the knee, a skin incision was made through the previous skin incision, and blunt dissection carried out down to the level of the fascial repair.  Of note, there was a fair amount of fluid encountered in the subcutaneous tissues, with no gross purulence.  Most of it appeared to be resolving hematoma type fluid, but a sufficient sample was sent for culture examination.  Fascial layer was intact proximally and distally, with a small connection to the deep aspect of the hip at the mid aspect.  This layer was then bluntly split proximally and distally, and hip joint readily encountered.  No gross  purulent fluid was noted deeply, however fibrous type tissue was noted and fluid that appeared to be consistent with a resolving hematoma similar to the fluid encountered in the suprafascial layer.  The hip joint was then copiously irrigated with pulsatile lavage and Irrisept lavage after the head and liner had been removed.  A new liner to accommodate a 36 head in a 60 cup was replaced, as well as a new +4 x 36 Oxinium head.  Hip joint was then reduced, and excellent stability noted throughout the full arc of motion with appropriate limb lengths.  Intraoperative fluoroscopy was utilized to assess the implants and ensure no unusual bony features prior to closure.  Vancomycin powder was placed in the hip joint layer, as well as in the suprafascial layer following closure of the fascia as described as follows.    Fascia latae was closed with #1 Vicryl in an interrupted figure-of-eight fashion in 3 strategic locations both proximally, distally and in the central portion, followed by oversewing this from distal to proximal with a #1 StrataFix symmetric, which nicely sealed that layer, followed by closure of the subcutaneous layer with 2-0 Vicryl and the skin with 2-0 nylon in a running horizontal mattress suture fashion.  Prevena wound suction sponge was then placed over the incision and secured appropriately.  The patient tolerated the procedure well and was brought to the recovery room in good condition.     POSTOPERATIVE PLAN:  1. The patient will begin early range of motion and weight-bearing per the post anterior total hip arthroplasty protocol.   2. I anticipate brief hospitalization for initial rehabilitation and pain control followed by continued rehabilitation and a home health/outpatient physical therapy setting.  Monitor culture results, and continue antibiotics until final results available.   3. Postoperative medical management with Dr. Pina.  4. Postoperative DVT prophylaxis with baby aspirin twice  a day for 1 month.  5. Postoperative IV antibiotics with Ancef till tomorrow.  Plan for infectious disease consultation in the morning.      Sumanth Durbin MD  05/14/24  17:35 EDT

## 2024-05-14 NOTE — PLAN OF CARE
Goal Outcome Evaluation:      Patient has no compliant of pain. Dressing at incision site saturated and changed. Hypertensive, prn labetolol given. Patient is npo in anticipation of procedure today.

## 2024-05-14 NOTE — ANESTHESIA PREPROCEDURE EVALUATION
Anesthesia Evaluation     Patient summary reviewed and Nursing notes reviewed   no history of anesthetic complications:   NPO Solid Status: > 8 hours  NPO Liquid Status: > 2 hours           Airway   Mallampati: II  TM distance: >3 FB  Neck ROM: full  No difficulty expected  Dental - normal exam     Pulmonary - normal exam    breath sounds clear to auscultation  (+) a smoker (1/2 ppd) Current,  Cardiovascular - normal exam    ECG reviewed  Rhythm: regular  Rate: normal    (+) hypertension      Neuro/Psych- negative ROS  GI/Hepatic/Renal/Endo    (+) obesity, GERD (Mild)    Musculoskeletal         ROS comment: S/p left TKA 4/24/24 - now with drainage and erythema  Abdominal    Substance History   (+) alcohol use     OB/GYN          Other   arthritis,                   Anesthesia Plan    ASA 3     general     intravenous induction     Anesthetic plan, risks, benefits, and alternatives have been provided, discussed and informed consent has been obtained with: patient.    Plan discussed with CRNA.    CODE STATUS:    Level Of Support Discussed With: Patient  Code Status (Patient has no pulse and is not breathing): CPR (Attempt to Resuscitate)  Medical Interventions (Patient has pulse or is breathing): Full Support

## 2024-05-14 NOTE — CASE MANAGEMENT/SOCIAL WORK
Discharge Planning Assessment  [facility]     Patient Name: Papito Salinas  MRN: [id number]  Today's Date: 5/14/2024    Admit Date: 5/13/2024    Plan: home with family   Discharge Needs Assessment       Row Name 05/14/24 0935       Living Environment    People in Home spouse    Current Living Arrangements home    Potentially Unsafe Housing Conditions none    In the past 12 months has the electric, gas, oil, or water company threatened to shut off services in your home? No    Primary Care Provided by self    Provides Primary Care For no one    Family Caregiver if Needed spouse    Quality of Family Relationships involved;helpful    Able to Return to Prior Arrangements yes       Resource/Environmental Concerns    Resource/Environmental Concerns none    Transportation Concerns none       Transportation Needs    In the past 12 months, has lack of transportation kept you from medical appointments or from getting medications? no    In the past 12 months, has lack of transportation kept you from meetings, work, or from getting things needed for daily living? No       Food Insecurity    Within the past 12 months, you worried that your food would run out before you got the money to buy more. Never true    Within the past 12 months, the food you bought just didn't last and you didn't have money to get more. Never true       Transition Planning    Patient/Family Anticipates Transition to home with family    Transportation Anticipated family or friend will provide       Discharge Needs Assessment    Readmission Within the Last 30 Days no previous admission in last 30 days    Equipment Currently Used at Home walker, rolling    Concerns to be Addressed discharge planning    Anticipated Changes Related to Illness none    Equipment Needed After Discharge none                   Discharge Plan       Row Name 05/14/24 0936       Plan    Plan home with family    Patient/Family in Agreement with Plan yes    Plan Comments Pt lives  in [place] with his wife. He reports he is independent with ADLs and owns a rolling walker. He is followed by his PCP and has drug coverage. At this time his plan for discharge is to return home. Per pt he has been to [facility] in the past. CM to follow    Final Discharge Disposition Code 01 - home or self-care                  Continued Care and Services - Admitted Since 5/13/2024    No active coordination exists for this encounter.          Demographic Summary       Row Name 05/14/24 0915       General Information    Admission Type observation    Referral Source physician    Reason for Consult discharge planning    Preferred Language English    General Information Comments PCP Dick Brewster       Contact Information    Permission Granted to Share Info With family/designee    Contact Information Comments Lucia Brad [phone]                   Functional Status       Row Name 05/14/24 0935       Functional Status    Current Activity Tolerance good       Physical Activity    On average, how many minutes do you engage in exercise at this level? 0 min       Functional Status, IADL    Medications independent    Meal Preparation independent    Housekeeping independent    Laundry independent    Shopping independent       Mental Status    General Appearance WDL WDL       Mental Status Summary    Recent Changes in Mental Status/Cognitive Functioning no changes       Employment/    Employment Status retired    Current or Previous Occupation not applicable                   Psychosocial    No documentation.                  Abuse/Neglect    No documentation.                  Legal    No documentation.                  Substance Abuse    No documentation.                  Patient Forms    No documentation.                     Demi Miles RN

## 2024-05-14 NOTE — PROGRESS NOTES
" progress note      Papito Salinas  [id number]  1945     LOS: 0 days     Attending: Faib Pina MD    Primary Care Provider: Dick Brewster MD      Chief Complaint/Reason for visit:  Postop wound infection    Subjective   Doing well. Good pain control. Still with wound drainage. Denies f/c/n/v/sob/cp.    Objective     Vital Signs  Visit Vitals  BP (!) 188/90 (BP Location: Right arm, Patient Position: Lying)   Pulse 70   Temp 98.9 °F (37.2 °C) (Oral)   Resp 18   Ht 185.4 cm (73\")   Wt 110 kg (243 lb)   SpO2 92%   BMI 32.06 kg/m²     Temp (24hrs), Av °F (36.7 °C), Min:97.1 °F (36.2 °C), Max:98.9 °F (37.2 °C)      Nutrition: NPO    Respiratory: RA    Physical Therapy: pending    Physical Exam:     General Appearance:    Alert, cooperative, in no acute distress   Head:    Normocephalic, without obvious abnormality, atraumatic    Lungs:     Normal effort, symmetric chest rise, no crepitus, clear to      auscultation bilaterally             Heart:    Regular rhythm and normal rate, normal S1 and S2   Abdomen:     Normal bowel sounds, no masses, no organomegaly, soft        non-tender, non-distended, no guarding, no rebound                tenderness   Extremities:   Bulky dressing left hip saturated   Pulses:   Pulses palpable and equal bilaterally   Skin:   No bleeding, bruising or rash   Neurologic:   Moves all extremities with no obvious focal motor deficit.  Cranial nerves 2 - 12 grossly intact. Flexion and dorsiflexion intact bilateral feet.       Results Review:     I reviewed the patient's new clinical results.   Results from last 7 days   Lab Units 24  1416   WBC 10*3/mm3 5.54   HEMOGLOBIN g/dL 12.0*   HEMATOCRIT % 36.8*   PLATELETS 10*3/mm3 313     Results from last 7 days   Lab Units 24  1416   SODIUM mmol/L 138   POTASSIUM mmol/L 3.7   CHLORIDE mmol/L 102   CO2 mmol/L 24.0   BUN mg/dL 10   CREATININE mg/dL 0.95   CALCIUM mg/dL 8.4*   GLUCOSE mg/dL 118*      " Latest Reference Range & Units 05/13/24 14:16   C-Reactive Protein 0.00 - 0.50 mg/dL 3.93 (H)   Lactate 0.5 - 2.0 mmol/L 1.6   Procalcitonin 0.00 - 0.25 ng/mL 0.08   (H): Data is abnormally high   Latest Reference Range & Units 05/13/24 14:16   Sed Rate 0 - 20 mm/hr 50 (H)   (H): Data is abnormally high    Venous duplex:  Interpretation Summary       No evidence of DVT    Incidentally noted enlarged left groin lymph nodes    I reviewed the patient's new imaging including images and reports.    All medications reviewed.   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 Durbin for washout.    1. PT/OT- WBAT LLE  2. Pain control-prns   3. IS-encouraged  [facility] post[facility]  5. Bowel regimen  6. Monitor post-op labs  7. DC planning when appropriate    HTN   - Continue home Diovan, Hytrin and Toprol  - Monitor BP   - Holding parameters for BP meds  - Labetalol PRN for SBP>170     Gout  -Continue home allopurinol     GERD  -Continue home PPI      Mariah Mckeon, ZEFERINO  05/14/24  11:20 EDT

## 2024-05-14 NOTE — ANESTHESIA POSTPROCEDURE EVALUATION
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        Post Anesthesia Care and Evaluation    Patient location during evaluation: PACU  Patient participation: complete - patient participated  Level of consciousness: awake  Pain management: adequate    Airway patency: patent  Anesthetic complications: No anesthetic complications  PONV Status: none  Cardiovascular status: hemodynamically stable and acceptable  Respiratory status: nonlabored ventilation, acceptable and nasal cannula  Hydration status: acceptable

## 2024-05-14 NOTE — ANESTHESIA PROCEDURE NOTES
Airway  Urgency: elective    Date/Time: 5/14/2024 3:11 PM  Airway not difficult    General Information and Staff    Patient location during procedure: OR  CRNA/CAA: Gee Garcia CRNA    Indications and Patient Condition  Indications for airway management: airway protection    Preoxygenated: yes  Mask difficulty assessment: 0 - not attempted    Final Airway Details  Final airway type: supraglottic airway      Successful airway: I-gel  Size 5     Number of attempts at approach: 1  Assessment: lips, teeth, and gum same as pre-op    Additional Comments  LMA placed without difficulty, ventilation with assist, equal breath sounds and symmetric chest rise and fall

## 2024-05-15 PROBLEM — Z98.890 S/P DEBRIDEMENT: Status: ACTIVE | Noted: 2024-05-15

## 2024-05-15 LAB
DEPRECATED RDW RBC AUTO: 41.6 FL (ref 37–54)
ERYTHROCYTE [DISTWIDTH] IN BLOOD BY AUTOMATED COUNT: 12.5 % (ref 12.3–15.4)
HCT VFR BLD AUTO: 34 % (ref 37.5–51)
HGB BLD-MCNC: 11.1 G/DL (ref 13–17.7)
MCH RBC QN AUTO: 29.8 PG (ref 26.6–33)
MCHC RBC AUTO-ENTMCNC: 32.6 G/DL (ref 31.5–35.7)
MCV RBC AUTO: 91.4 FL (ref 79–97)
PLATELET # BLD AUTO: 278 10*3/MM3 (ref 140–450)
PMV BLD AUTO: 9.9 FL (ref 6–12)
RBC # BLD AUTO: 3.72 10*6/MM3 (ref 4.14–5.8)
WBC NRBC COR # BLD AUTO: 7.96 10*3/MM3 (ref 3.4–10.8)

## 2024-05-15 PROCEDURE — 97116 GAIT TRAINING THERAPY: CPT

## 2024-05-15 PROCEDURE — 25010000002 CEFTRIAXONE PER 250 MG: Performed by: INTERNAL MEDICINE

## 2024-05-15 PROCEDURE — 25010000002 CEFAZOLIN PER 500 MG: Performed by: ORTHOPAEDIC SURGERY

## 2024-05-15 PROCEDURE — 97110 THERAPEUTIC EXERCISES: CPT

## 2024-05-15 PROCEDURE — 85027 COMPLETE CBC AUTOMATED: CPT | Performed by: ORTHOPAEDIC SURGERY

## 2024-05-15 PROCEDURE — 25010000002 DAPTOMYCIN PER 1 MG: Performed by: INTERNAL MEDICINE

## 2024-05-15 PROCEDURE — 99024 POSTOP FOLLOW-UP VISIT: CPT | Performed by: ORTHOPAEDIC SURGERY

## 2024-05-15 PROCEDURE — 25810000003 SODIUM CHLORIDE 0.9 % SOLUTION: Performed by: ORTHOPAEDIC SURGERY

## 2024-05-15 PROCEDURE — 97161 PT EVAL LOW COMPLEX 20 MIN: CPT

## 2024-05-15 RX ADMIN — SODIUM CHLORIDE 2 G: 900 INJECTION INTRAVENOUS at 03:30

## 2024-05-15 RX ADMIN — VALSARTAN 160 MG: 160 TABLET, FILM COATED ORAL at 08:49

## 2024-05-15 RX ADMIN — HYDROCODONE BITARTRATE AND ACETAMINOPHEN 1 TABLET: 5; 325 TABLET ORAL at 14:12

## 2024-05-15 RX ADMIN — ACETAMINOPHEN 1000 MG: 500 TABLET ORAL at 01:15

## 2024-05-15 RX ADMIN — PANTOPRAZOLE SODIUM 40 MG: 40 TABLET, DELAYED RELEASE ORAL at 05:11

## 2024-05-15 RX ADMIN — Medication 10 ML: at 20:06

## 2024-05-15 RX ADMIN — METOPROLOL SUCCINATE 100 MG: 100 TABLET, EXTENDED RELEASE ORAL at 08:49

## 2024-05-15 RX ADMIN — TERAZOSIN HYDROCHLORIDE 5 MG: 5 CAPSULE ORAL at 20:04

## 2024-05-15 RX ADMIN — CEFTRIAXONE 2000 MG: 2 INJECTION, POWDER, FOR SOLUTION INTRAMUSCULAR; INTRAVENOUS at 14:12

## 2024-05-15 RX ADMIN — DAPTOMYCIN 750 MG: 500 INJECTION, POWDER, LYOPHILIZED, FOR SOLUTION INTRAVENOUS at 15:54

## 2024-05-15 RX ADMIN — MESALAMINE 1.2 G: 1.2 TABLET, DELAYED RELEASE ORAL at 08:49

## 2024-05-15 RX ADMIN — ALLOPURINOL 300 MG: 300 TABLET ORAL at 08:52

## 2024-05-15 RX ADMIN — ACETAMINOPHEN 1000 MG: 500 TABLET ORAL at 20:03

## 2024-05-15 RX ADMIN — GABAPENTIN 300 MG: 300 CAPSULE ORAL at 20:04

## 2024-05-15 RX ADMIN — MESALAMINE 1.2 G: 1.2 TABLET, DELAYED RELEASE ORAL at 20:04

## 2024-05-15 RX ADMIN — ASPIRIN 81 MG: 81 TABLET, COATED ORAL at 08:49

## 2024-05-15 RX ADMIN — SODIUM CHLORIDE 120 ML/HR: 9 INJECTION, SOLUTION INTRAVENOUS at 05:12

## 2024-05-15 RX ADMIN — ASPIRIN 81 MG: 81 TABLET, COATED ORAL at 20:04

## 2024-05-15 RX ADMIN — HYDROCODONE BITARTRATE AND ACETAMINOPHEN 1 TABLET: 5; 325 TABLET ORAL at 08:51

## 2024-05-15 NOTE — THERAPY EVALUATION
Patient Name: Papito Salinas  : 1945    MRN: [id number]                              Today's Date: 5/15/2024       Admit Date: 2024    Visit Dx:     ICD-10-CM ICD-9-CM   1. Seroma of musculoskeletal structure after musculoskeletal system procedure  M96.842 998.13   2. 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    Status post total hip replacement, left     Past Medical History:   Diagnosis Date    Arthritis     Edema     Lower extremities    GERD (gastroesophageal reflux disease)     History of transfusion     Hypertension     Insomnia     Plantar fascial fibromatosis     Tibia fracture     Left     Past Surgical History:   Procedure Laterality Date    CATARACT EXTRACTION Bilateral     COLONOSCOPY      TOTAL HIP ARTHROPLASTY Left 2024    Procedure: TOTAL HIP ARTHROPLASTY ANTERIOR MODIFIED LEFT;  Surgeon: Sumanth Durbin MD;  Location: [facility];  Service: Orthopedics;  Laterality: Left;    VEIN SURGERY Left     Vein Stripping      General Information       Row Name 05/15/24 1015          Physical Therapy Time and Intention    Document Type evaluation  -AB     Mode of Treatment physical therapy  -AB       Row Name 05/15/24 1015          General Information    Patient Profile Reviewed yes  -AB     Prior Level of Function all household mobility;community mobility;gait;transfer;bed mobility;ADL's;min assist:  Current with OPPT. Progressing with mobility since NAVNEET on 24.  -AB     Existing  Precautions/Restrictions fall;other (see comments);left;hip, anterior  L NAVNEET 4/23; 5/14 I&D with head and liner exchange; wound vac  -AB     Barriers to Rehab medically complex  -AB       Row Name 05/15/24 1015          Living Environment    People in Home spouse  -AB       Row Name 05/15/24 1015          Home Main Entrance    Number of Stairs, Main Entrance other (see comments)  15 steps from garage to main level.  -AB     Stair Railings, Main Entrance railings on both sides of stairs  -AB       Row Name 05/15/24 1015          Stairs Within Home, Primary    Number of Stairs, Within Home, Primary none  -AB       Row Name 05/15/24 1015          Cognition    Orientation Status (Cognition) oriented x 4  -AB       Row Name 05/15/24 1015          Safety Issues, Functional Mobility    Safety Issues Affecting Function (Mobility) insight into deficits/self-awareness;safety precaution awareness;safety precautions follow-through/compliance  -AB     Impairments Affecting Function (Mobility) endurance/activity tolerance;pain;strength;range of motion (ROM)  -AB               User Key  (r) = Recorded By, (t) = Taken By, (c) = Cosigned By      Initials Name Provider Type    AB Seble Mata, PT Physical Therapist                   Mobility       Row Name 05/15/24 1022          Bed Mobility    Bed Mobility supine-sit;scooting/bridging  -AB     Scooting/Bridging [place] (Bed Mobility) contact guard;1 person assist;verbal cues  -AB     Supine-Sit [place] (Bed Mobility) minimum assist (75% patient effort);1 person assist;verbal cues  -AB     Assistive Device (Bed Mobility) head of bed elevated  -AB     Comment, (Bed Mobility) Assist to advance LLE to EOB secondary to pain.  -AB       Row Name 05/15/24 1022          Transfers    Comment, (Transfers) Cues for hand placement and sequencing. Increased pain throughout.  -AB       Row Name 05/15/24 1022          Sit-Stand Transfer    Sit-Stand [place] (Transfers) contact  guard;1 person assist;verbal cues  -AB     Assistive Device (Sit-Stand Transfers) walker, front-wheeled  -AB     Comment, (Sit-Stand Transfer) Cues to advance RLE prior to stand for comfort.  -AB       Row Name 05/15/24 1022          Gait/Stairs (Locomotion)    [place] Level (Gait) contact guard;1 person assist;verbal cues  -AB     Assistive Device (Gait) walker, front-wheeled  -AB     Patient was able to Ambulate yes  -AB     Distance in Feet (Gait) 190  -AB     Deviations/Abnormal Patterns (Gait) bilateral deviations;[place] decreased;gait speed decreased;stride length decreased  -AB     Bilateral Gait Deviations forward flexed posture;heel strike decreased  -AB     Left Sided Gait Deviations weight shift ability decreased  -AB     Comment, (Gait/Stairs) Pt ambulated with step through gait pattern, slowed soraya, and decreased weight acceptance onto LLE. Cues for upright posture and keeping walker closer to body. No overt LOB or knee buckling. Further activity limited by fatigue and L groin pain.  -AB       Row Name 05/15/24 1022          Mobility    Extremity Weight-bearing Status left lower extremity  -AB     Left Lower Extremity (Weight-bearing Status) weight-bearing as tolerated (WBAT)  -AB               User Key  (r) = Recorded By, (t) = Taken By, (c) = Cosigned By      Initials Name Provider Type    Seble Roberto, PT Physical Therapist                   Obj/Interventions       Row Name 05/15/24 1030          Range of Motion Comprehensive    General Range of Motion bilateral lower extremity ROM WFL  -AB       Row Name 05/15/24 1030          Strength Comprehensive (MMT)    General Manual Muscle Testing (MMT) Assessment lower extremity strength deficits identified  -AB     Comment, General Manual Muscle Testing (MMT) Assessment Pt able to perform active DF bilaterally. Unable to complete L active SLR secondary to pain but demonstrated strong quad set so KI deferred.  -AB       Row Name 05/15/24 1030           Motor Skills    Therapeutic Exercise knee;ankle;hip  -AB       Row Name 05/15/24 1030          Hip (Therapeutic Exercise)    Hip (Therapeutic Exercise) strengthening exercise;isometric exercises;AAROM (active assistive range of motion)  -AB     Hip AAROM (Therapeutic Exercise) left;flexion;10 repetitions  -AB     Hip Isometrics (Therapeutic Exercise) bilateral;gluteal sets;10 repetitions  -AB     Hip Strengthening (Therapeutic Exercise) left;aBduction;10 repetitions  -AB       Row Name 05/15/24 1030          Knee (Therapeutic Exercise)    Knee (Therapeutic Exercise) isometric exercises;strengthening exercise  -AB     Knee Isometrics (Therapeutic Exercise) bilateral;quad sets;10 repetitions  -AB     Knee Strengthening (Therapeutic Exercise) left;LAQ (long arc quad);heel slides;10 repetitions  -AB       Row Name 05/15/24 1030          Ankle (Therapeutic Exercise)    Ankle (Therapeutic Exercise) AROM (active range of motion)  -AB     Ankle AROM (Therapeutic Exercise) bilateral;dorsiflexion;plantarflexion;10 repetitions  -AB       Row Name 05/15/24 1030          Balance    Balance Assessment sitting static balance;standing static balance;standing dynamic balance;sitting dynamic balance  -AB     Static Sitting Balance standby assist  -AB     Dynamic Sitting Balance standby assist  -AB     Position, Sitting Balance unsupported;sitting edge of bed  -AB     Static Standing Balance contact guard  -AB     Dynamic Standing Balance contact guard;1-person assist;verbal cues  -AB     Position/Device Used, Standing Balance supported;walker, front-wheeled  -AB     Balance Interventions sitting;standing;sit to stand;supported;static;dynamic;occupation based/functional task  -AB     Comment, Balance No overt LOB or knee buckling.  -AB       Row Name 05/15/24 1030          Sensory Assessment (Somatosensory)    Sensory Assessment (Somatosensory) LE sensation intact  -AB               User Key  (r) = Recorded By, (t) = Taken  By, (c) = Cosigned By      Initials Name Provider Type    AB Seble Mata, PT Physical Therapist                   Goals/Plan       Row Name 05/15/24 1036          Bed Mobility Goal 1 (PT)    Activity/Assistive Device (Bed Mobility Goal 1, PT) sit to supine;supine to sit  -AB     [place] Level/Cues Needed (Bed Mobility Goal 1, PT) independent  -AB     Time Frame (Bed Mobility Goal 1, PT) long term goal (LTG);3 days  -AB       Row Name 05/15/24 1036          Transfer Goal 1 (PT)    Activity/Assistive Device (Transfer Goal 1, PT) sit-to-stand/stand-to-sit;bed-to-chair/chair-to-bed;walker, rolling  -AB     [place] Level/Cues Needed (Transfer Goal 1, PT) modified independence  -AB     Time Frame (Transfer Goal 1, PT) long term goal (LTG);3 days  -AB       Row Name 05/15/24 1036          Gait Training Goal 1 (PT)    Activity/Assistive Device (Gait Training Goal 1, PT) gait (walking locomotion);assistive device use;walker, rolling  -AB     [place] Level (Gait Training Goal 1, PT) modified independence  -AB     Distance (Gait Training Goal 1, PT) 350  -AB     Time Frame (Gait Training Goal 1, PT) long term goal (LTG);3 days  -AB       Row Name 05/15/24 1036          Stairs Goal 1 (PT)    Activity/Assistive Device (Stairs Goal 1, PT) ascending stairs;descending stairs;using handrail, right;using handrail, left  -AB     [place] Level/Cues Needed (Stairs Goal 1, PT) contact guard required  -AB     Number of Stairs (Stairs Goal 1, PT) 15  -AB     Time Frame (Stairs Goal 1, PT) long term goal (LTG);3 days  -AB       Row Name 05/15/24 1036          Therapy Assessment/Plan (PT)    Planned Therapy Interventions (PT) balance training;bed mobility training;gait training;patient/family education;postural re-education;transfer training;stretching;strengthening;stair training;ROM (range of motion)  -AB               User Key  (r) = Recorded By, (t) = Taken By, (c) = Cosigned By      Initials Name Provider Type     AB Seble Mata, PT Physical Therapist                   Clinical Impression       Row Name 05/15/24 1032          Pain    Pretreatment Pain Rating 6/10  -AB     Posttreatment Pain Rating 6/10  -AB     Pain Location - Side/Orientation Left  -AB     Pain Location generalized  -AB     Pain Location - groin  -AB     Pre/Posttreatment Pain Comment Pt reports majority of pain is in groin area.  -AB     Pain Intervention(s) Ambulation/increased activity;Elevated;Repositioned;Nursing Notified  -AB     Additional Documentation Pain Scale: Numbers Pre/Post-Treatment (Group)  -AB       Row Name 05/15/24 1032          Plan of Care Review    Plan of Care Reviewed With patient  -AB     Progress no change  -AB     Outcome Evaluation PT initial eval completed. Pt presents below his functional baseline with acute pain, weakness, and impaired endurance. Ambulation of 190' with CGA and RW was well tolerated. No overt LOB or knee buckling. HEP completed. Further IPPT is warrented. PT rec d/c home with assist and OPPT when medically appropriate.  -AB       Row Name 05/15/24 1032          Therapy Assessment/Plan (PT)    Rehab Potential (PT) good, to achieve stated therapy goals  -AB     Criteria for Skilled Interventions Met (PT) yes;meets criteria;skilled treatment is necessary  -AB     Therapy Frequency (PT) 2 times/day  -AB       Row Name 05/15/24 1032          Vital Signs    Pre Systolic BP Rehab 160  -AB     Pre Treatment Diastolic BP 95  -AB     Post Systolic BP Rehab 186   -AB     Post Treatment Diastolic BP 85   -AB     Posttreatment Heart Rate (beats/min) 76  -AB     Pre SpO2 (%) 95  -AB     O2 Delivery Pre Treatment room air  -AB     O2 Delivery Intra Treatment room air  -AB     Post SpO2 (%) 93  -AB     O2 Delivery Post Treatment room air  -AB     Pre Patient Position Supine  -AB     Intra Patient Position Standing  -AB     Post Patient Position Sitting  -AB       Row Name 05/15/24 1032          Positioning and Restraints     Pre-Treatment Position in bed  -AB     Post Treatment Position chair  -AB     In Chair notified nsg;reclined;sitting;call light within reach;encouraged to call for assist;exit alarm on;legs elevated;compression device  -AB               User Key  (r) = Recorded By, (t) = Taken By, (c) = Cosigned By      Initials Name Provider Type    AB Seble Mata, PT Physical Therapist                   Outcome Measures       Row Name 05/15/24 1037          How much help from another person do you currently need...    Turning from your back to your side while in flat bed without using bedrails? 4  -AB     Moving from lying on back to sitting on the side of a flat bed without bedrails? 3  -AB     Moving to and from a bed to a chair (including a wheelchair)? 3  -AB     Standing up from a chair using your arms (e.g., wheelchair, bedside chair)? 3  -AB     Climbing 3-5 steps with a railing? 3  -AB     To walk in hospital room? 3  -AB     AM-PAC 6 Clicks Score (PT) 19  -AB     Highest Level of Mobility Goal 6 --> Walk 10 steps or more  -AB       Row Name 05/15/24 1037          PADD    Diagnosis 2  -AB     Gender 2  -AB     Age Group 0  -AB     Gait Distance 1  -AB     Assist Level 1  -AB     Home Support 3  -AB     PADD Score 9  -AB     Patient Preference home with outpatient rehab  -AB     Prediction by PADD Score directly home (with home health or out-patient rehab)  -AB       Row Name 05/15/24 1037          Functional Assessment    Outcome Measure Options AM-PAC 6 Clicks Basic Mobility (PT);PADD  -AB               User Key  (r) = Recorded By, (t) = Taken By, (c) = Cosigned By      Initials Name Provider Type    AB Seble Mata, PT Physical Therapist                                 Physical Therapy Education       Title: PT OT SLP Therapies (Done)       Topic: Physical Therapy (Done)       Point: Mobility training (Done)       Learning Progress Summary             Patient Acceptance, E,D, VU,DU by AB at 5/15/2024 1038                          Point: Home exercise program (Done)       Learning Progress Summary             Patient Acceptance, E,D, VU,DU by AB at 5/15/2024 1038                         Point: Body mechanics (Done)       Learning Progress Summary             Patient Acceptance, E,D, VU,DU by AB at 5/15/2024 1038                         Point: Precautions (Done)       Learning Progress Summary             Patient Acceptance, E,D, VU,DU by AB at 5/15/2024 1038                                         User Key       Initials Effective Dates Name Provider Type Discipline    AB 09/22/22 -  Seble Mata, PT Physical Therapist PT                  PT Recommendation and Plan  Planned Therapy Interventions (PT): balance training, bed mobility training, gait training, patient/family education, postural re-education, transfer training, stretching, strengthening, stair training, ROM (range of motion)  Plan of Care Reviewed With: patient  Progress: no change  Outcome Evaluation: PT initial eval completed. Pt presents below his functional baseline with acute pain, weakness, and impaired endurance. Ambulation of 190' with CGA and RW was well tolerated. No overt LOB or knee buckling. HEP completed. Further IPPT is warrented. PT rec d/c home with assist and OPPT when medically appropriate.     Time Calculation:   PT Evaluation Complexity  History, PT Evaluation Complexity: 1-2 personal factors and/or comorbidities  Examination of Body Systems (PT Eval Complexity): total of 3 or more elements  Clinical Presentation (PT Evaluation Complexity): stable  Clinical Decision Making (PT Evaluation Complexity): low complexity  Overall Complexity (PT Evaluation Complexity): low complexity     PT Charges       Row Name 05/15/24 1038             Time Calculation    Start Time 0939  -AB      PT Received On 05/15/24  -AB      PT Goal Re-Cert Due Date 05/25/24  -AB         Timed Charges    41468 - PT Therapeutic Exercise Minutes 10  -AB         Untimed  Charges    PT Eval/Re-eval Minutes 48  -AB         Total Minutes    Timed Charges Total Minutes 10  -AB      Untimed Charges Total Minutes 48  -AB       Total Minutes 58  -AB                User Key  (r) = Recorded By, (t) = Taken By, (c) = Cosigned By      Initials Name Provider Type    AB Seble Mata, PT Physical Therapist                  Therapy Charges for Today       Code Description Service Date Service Provider Modifiers Qty    35870257752 HC PT THER PROC EA 15 MIN 5/15/2024 Seble Mata, PT GP 1    72239202477 HC PT EVAL LOW COMPLEXITY 4 5/15/2024 Seble Mata, PT GP 1            PT G-Codes  Outcome Measure Options: AM-PAC 6 Clicks Basic Mobility (PT), PADD  AM-PAC 6 Clicks Score (PT): 19  PT Discharge Summary  Anticipated Discharge Disposition (PT): home with assist, home with outpatient therapy services    Seble Mata, EVELIO  5/15/2024

## 2024-05-15 NOTE — CASE MANAGEMENT/SOCIAL WORK
Continued Stay Note  [facility]     Patient Name: Papito Salinas  MRN: [id number]  Today's Date: 5/15/2024    Admit Date: 5/13/2024    Plan: home with family   Discharge Plan       Row Name 05/15/24 1111       Plan    Plan Comments At this time PT is recommending home with outpt PT. Pt is agreeable and has used [facility] previously. This can be arranged again once ID recs are made. Cm to follow                   Discharge Codes    No documentation.                       Demi Miles RN

## 2024-05-15 NOTE — PLAN OF CARE
Goal Outcome Evaluation:  Plan of Care Reviewed With: patient        Progress: no change  Outcome Evaluation: PT initial eval completed. Pt presents below his functional baseline with acute pain, weakness, and impaired endurance. Ambulation of 190' with CGA and RW was well tolerated. No overt LOB or knee buckling. HEP completed. Further IPPT is warrented. PT rec d/c home with assist and OPPT when medically appropriate.      Anticipated Discharge Disposition (PT): home with assist, home with outpatient therapy services

## 2024-05-15 NOTE — PROGRESS NOTES
"      [facility] Orthopaedic Surgery Progress Note    Subjective      LOS: 1 day   Patient Care Team:  Dick Brewster MD as PCP - General    CC: Left hip pain    Interval History:   Patient resting comfortably in bed.  Pain controlled.    Objective      Vital Signs  Temp (24hrs), Av.2 °F (36.8 °C), Min:97.1 °F (36.2 °C), Max:98.9 °F (37.2 °C)      BP (!) 185/89 (BP Location: Right arm, Patient Position: Lying)   Pulse 69   Temp 98 °F (36.7 °C) (Oral)   Resp 16   Ht 185.4 cm (73\")   Wt 110 kg (243 lb)   SpO2 93%   BMI 32.06 kg/m²     Examination:   Examination of the left hip: The wound suction dressing in place, no drainage.  Knee flexion, knee extension, ankle dorsiflexion, ankle plantar flexion, and EHL are intact.  Sensation intact in the foot to light touch.   Thigh is soft and nontender.      Labs:  Results from last 7 days   Lab Units 05/15/24  0614 24  1416   WBC 10*3/mm3 7.96 5.54   HEMOGLOBIN g/dL 11.1* 12.0*   HEMATOCRIT % 34.0* 36.8*   MCV fL 91.4 90.2   PLATELETS 10*3/mm3 278 313     Microbiology Results (last 10 days)       Procedure Component Value - Date/Time    Body Fluid Culture - Surgical Site, Hip, Left [[id number]] Collected: 24 1609    Lab Status: Preliminary result Specimen: Surgical Site from Hip, Left Updated: 24 192     Gram Stain Many (4+) WBCs seen      No organisms seen    Blood Culture - Blood, Hand, Left [[id number]]  (Normal) Collected: 24 1416    Lab Status: Preliminary result Specimen: Blood from Hand, Left Updated: 24 1501     Blood Culture No growth at 24 hours    Blood Culture - Blood, Wrist, Left [[id number]]  (Normal) Collected: 24 1416    Lab Status: Preliminary result Specimen: Blood from Wrist, Left Updated: 24 1501     Blood Culture No growth at 24 hours             Radiology:  Imaging Results (Last 24 Hours)       Procedure Component Value Units Date/Time    XR Hip With or Without Pelvis 2 - 3 View Left [[id number]] " Collected: 05/14/24 1819     Updated: 05/14/24 1827    Narrative:      DATE OF EXAM: 5/14/2024 6:03 PM     PROCEDURE: XR HIP W OR WO PELVIS 2-3 VIEW LEFT-     INDICATIONS: post-op left NAVNEET, I&D with head and liner exchange;  M96.842-Postprocedural seroma of a musculoskeletal structure following a  musculoskeletal system procedure; Z96.642-Presence of left artificial  hip joint     COMPARISON: Same-day radiographs     TECHNIQUE: Two views of the left hip with Pelvis were obtained.     FINDINGS:  Left hip arthroplasty is in normal alignment. No evidence of new  hardware complication. The bony pelvis is intact with normal alignment.  Moderate right hip arthritis. Expected surgical changes in the left hip  soft tissues. Chronic deformity of the proximal left femur     IMPRESSION   Left hip arthroplasty without evidence of hardware complication.         This report was finalized on 5/14/2024 6:24 PM by Ernesto Wong MD.       FL C Arm During Surgery [[id number]] Resulted: 05/14/24 1700     Updated: 05/14/24 1700    Narrative:      This procedure was auto-finalized with no dictation required.    XR Hip With or Without Pelvis 2 - 3 View Left [[id number]] Collected: 05/14/24 1252     Updated: 05/14/24 1257    Narrative:      DATE OF EXAM: 5/14/2024 12:34 PM     PROCEDURE: XR HIP W OR WO PELVIS 2-3 VIEW LEFT-     INDICATIONS: s/p left NAVNEET     COMPARISON: 4/23/2024.     TECHNIQUE: Two views of the left hip with Pelvis were obtained.     FINDINGS:  3 films. There is a total left hip arthroplasty in normal anatomic  alignment. There is old fracture deformity of the proximal left femur.  There are no hardware complications. There is mild narrowing of the  right hip joint. The right femoral head has a normal rounded contour and  is normally seated within the acetabulum.       Impression:      Normal-appearing left hip arthroplasty.         This report was finalized on 5/14/2024 12:53 PM by Jessica Garay MD.                PT:  Physical Therapy - Plan of Care Review - Outcome Summary:   ]       Results Review:     I reviewed the patient's new clinical results.    Assessment and Plan     Assessment:   1 Day Post-Op status post I&D left hip    Continue antibiotics-infectious disease consultation requested  Monitor culture results  Weightbearing as tolerated left lower extremity  Continue wound suction dressing      Left hip prosthetic joint infection    Gout    Esophageal reflux    HTN (hypertension)    Obesity (BMI 30-39.9)    Status post total replacement of left hip 4/23/24    Seroma of musculoskeletal structure after musculoskeletal system procedure      Plan for disposition: Plan for discharge most likely to home once antibiotic regimen established and patient cleared medically and by physical therapy.  Follow-up in 2 weeks in the office.      Future Appointments   Date Time Provider Department Center   7/23/2024 11:00 AM Dick Brewster MD [facility] Michael E Kirk, MD  05/15/24  07:45 EDT

## 2024-05-15 NOTE — CONSULTS
INFECTIOUS DISEASE CONSULT/INITIAL HOSPITAL VISIT    Papito Salinas  1945  [id number]    Date of Consult: 5/15/2024    Admission Date: 5/13/2024      Requesting Provider: No Known Provider  Evaluating Physician: Behzad Dumont MD    Reason for Consultation:  Left total hip arthroplasty infection    History of present illness:    Patient is a 78 y.o. male who is seen today for a left total hip arthroplasty infection.  He underwent a left hip arthroplasty 2 weeks ago.  On 4/30 he noted a small ulcerated lesion adjacent to his incision.  On 5/9 he noted erythema and swelling of his incision and was started on oral cephalexin.  He subsequently developed increasing cloudy wound drainage.  yesterday he underwentdebridement with poly exchange.  Dr. Durbin indicates that he had a left hip seroma/hematoma with possible infection.   Photos that his wife took demonstrate cloudy drainage and erythema around the incision.  Left hip Gram stain from yesterday reveals many white blood cells with no organisms seen.  Left hip cultures are pending.  Blood cultures are no growth so far from 5/13.  He received perioperative cefazolin.  He has remained afebrile.  C-reactive protein is 3.93 and sedimentation rate is 50.  White blood cell count is 7.96.  He denies increased hip pain.    Past Medical History:   Diagnosis Date    Arthritis     Edema     Lower extremities    GERD (gastroesophageal reflux disease)     History of transfusion     Hypertension     Insomnia     Plantar fascial fibromatosis     Tibia fracture     Left       Past Surgical History:   Procedure Laterality Date    CATARACT EXTRACTION Bilateral     COLONOSCOPY      TOTAL HIP ARTHROPLASTY Left 4/23/2024    Procedure: TOTAL HIP ARTHROPLASTY ANTERIOR MODIFIED LEFT;  Surgeon: Sumanth Durbin MD;  Location: [facility];  Service: Orthopedics;  Laterality: Left;    VEIN SURGERY Left     Vein Stripping       Family History   Problem Relation Age of Onset     Cancer Mother     Cancer Father        Social History     Socioeconomic History    Marital status:    Tobacco Use    Smoking status: Former     Types: Cigarettes    Smokeless tobacco: Former     Types: Chew   Vaping Use    Vaping status: Never Used   Substance and Sexual Activity    Alcohol use: Yes     Comment: occasional    Drug use: No    Sexual activity: Defer       Allergies   Allergen Reactions    Nsaids GI Bleeding     colitis    Penicillins Hives     Fever spiked and out for 7days         Medication:    Current Facility-Administered Medications:     acetaminophen (TYLENOL) tablet 1,000 mg, 1,000 mg, Oral, Q6H, Sumanth Durbin MD, 1,000 mg at 05/15/24 0115    allopurinol (ZYLOPRIM) tablet 300 mg, 300 mg, Oral, Daily, Sumanth Durbin MD, 300 mg at 05/13/24 1255    amitriptyline (ELAVIL) tablet 25 mg, 25 mg, Oral, Nightly PRN, Sumanth Durbin MD    aspirin EC tablet 81 mg, 81 mg, Oral, Q12H, Sumanth Durbin MD    sennosides-docusate (PERICOLACE) 8.6-50 MG per tablet 2 tablet, 2 tablet, Oral, BID PRN **AND** polyethylene glycol (MIRALAX) packet 17 g, 17 g, Oral, Daily PRN **AND** bisacodyl (DULCOLAX) EC tablet 5 mg, 5 mg, Oral, Daily PRN **AND** bisacodyl (DULCOLAX) suppository 10 mg, 10 mg, Rectal, Daily PRN, Sumanth Durbin MD    gabapentin (NEURONTIN) capsule 300 mg, 300 mg, Oral, Nightly, Sumanth Durbin MD, 300 mg at 05/14/24 2021    HYDROcodone-acetaminophen (NORCO) 5-325 MG per tablet 1 tablet, 1 tablet, Oral, Q4H PRN, Sumanth Durbin MD    HYDROmorphone (DILAUDID) injection 0.5 mg, 0.5 mg, Intravenous, Q2H PRN **AND** naloxone (NARCAN) injection 0.1 mg, 0.1 mg, Intravenous, Q5 Min PRN, Sumanth Durbin MD    labetalol (NORMODYNE,TRANDATE) injection 10 mg, 10 mg, Intravenous, Q4H PRN, Sumanth Durbin MD, 10 mg at 05/13/24 2106    mesalamine (LIALDA) EC tablet 1.2 g, 1.2 g, Oral, Q12H, Sumanth Durbin MD, 1.2 g at 05/14/24 2021    metoprolol succinate XL (TOPROL-XL) 24 hr tablet 100 mg, 100  mg, Oral, Daily, Sumanth Durbin MD, 100 mg at 05/14/24 0903    morphine injection 4 mg, 4 mg, Intravenous, Q2H PRN **AND** naloxone (NARCAN) injection 0.4 mg, 0.4 mg, Intravenous, Q5 Min PRN, Sumanth Durbin MD    ondansetron (ZOFRAN) injection 4 mg, 4 mg, Intravenous, Q6H PRN, Sumanth Durbin MD    ondansetron ODT (ZOFRAN-ODT) disintegrating tablet 4 mg, 4 mg, Oral, Q6H PRN **OR** ondansetron (ZOFRAN) injection 4 mg, 4 mg, Intravenous, Q6H PRN, Sumanth Durbin MD    pantoprazole (PROTONIX) EC tablet 40 mg, 40 mg, Oral, Q AM, Sumanth Durbin MD, 40 mg at 05/15/24 0511    sodium chloride 0.9 % bolus 500 mL, 500 mL, Intravenous, TID PRN, Sumanth Durbin MD    sodium chloride 0.9 % flush 1-10 mL, 1-10 mL, Intravenous, PRN, Sumanth Durbin MD    sodium chloride 0.9 % flush 10 mL, 10 mL, Intravenous, Q12H, Sumanth Durbin MD, 10 mL at 05/14/24 2024    sodium chloride 0.9 % flush 10 mL, 10 mL, Intravenous, PRN, Sumanth Durbin MD    sodium chloride 0.9 % flush 10 mL, 10 mL, Intravenous, Q12H, Sumanth Durbin MD, 10 mL at 05/14/24 2023    sodium chloride 0.9 % infusion 40 mL, 40 mL, Intravenous, PRN, Sumanth Durbin MD    sodium chloride 0.9 % infusion 40 mL, 40 mL, Intravenous, PRN, Sumanth Durbin MD    sodium chloride 0.9 % infusion, 120 mL/hr, Intravenous, Continuous, Sumanth Durbin MD, Last Rate: 120 mL/hr at 05/15/24 0512, 120 mL/hr at 05/15/24 0512    temazepam (RESTORIL) capsule 15 mg, 15 mg, Oral, Nightly PRN, Sumanth Durbin MD, 15 mg at 05/13/24 2007    terazosin (HYTRIN) capsule 5 mg, 5 mg, Oral, Nightly, Sumanth Durbin MD, 5 mg at 05/14/24 2021    valsartan (DIOVAN) tablet 160 mg, 160 mg, Oral, Daily, Sumanth Durbin MD, 160 mg at 05/13/24 1255    Antibiotics:  Anti-Infectives (From admission, onward)      Ordered     Dose/Rate Route Frequency Start Stop    05/14/24 1908  ceFAZolin 2000 mg IVPB in 100 mL NS (MBP)        Ordering Provider: Sumanth Durbin MD    2 g  over 30 Minutes  Intravenous Every 8 Hours 05/14/24 2000 05/15/24 0400              Review of Systems:  Constitutional-- No Fever, chills or sweats.   HEENT-- No new vision, hearing or throat complaints.   CV-- No chest pain, palpitation or syncope  Resp-- No SOB/cough  GI- No nausea, vomiting, or diarrhea.  -- No dysuria, hematuria, or flank pain.  Denies hesitancy, urgency or flank pain.  MS--   Left hip pain  Neuro-- No acute focal weakness or numbness in the arms or legs.  No seizures.  Skin--No rashes or lesions      Physical Exam:   Vital Signs  Temp (24hrs), Av.2 °F (36.8 °C), Min:97.1 °F (36.2 °C), Max:98.9 °F (37.2 °C)    Temp  Min: 97.1 °F (36.2 °C)  Max: 98.9 °F (37.2 °C)  BP  Min: 123/85  Max: 203/94  Pulse  Min: 64  Max: 73  Resp  Min: 14  Max: 18  SpO2  Min: 88 %  Max: 97 %    GENERAL: Awake and alert, in no acute distress.   HEENT: Normocephalic, atraumatic.  PERRL. EOMI. No conjunctival injection. No icterus. Oropharynx clear without evidence of thrush or exudate. No evidence of periodontal disease.    NECK: Supple   HEART: RRR; No murmur, rubs, gallops.   LUNGS: Clear to auscultation bilaterally without wheezing, rales, rhonchi. Normal respiratory effort. Nonlabored. No dullness.  ABDOMEN: Soft, nontender, nondistended. No rebound or guarding. NO mass or HSM  EXT:  left hip has a wound VAC in place with no visible erythema.   Photos that his wife showed me reveal erythema around the wound prior to his surgical debridement   :  Without Eprez catheter.  MSK: No joint effusions or erythema  SKIN: Warm and dry without cutaneous eruptions on Inspection/palpation.    NEURO: Oriented to PPT.  Motor 5/5 strength  PSYCHIATRIC: Normal insight and judgment. Cooperative with PE    Laboratory Data    Results from last 7 days   Lab Units 05/15/24  0614 24  1416   WBC 10*3/mm3 7.96 5.54   HEMOGLOBIN g/dL 11.1* 12.0*   HEMATOCRIT % 34.0* 36.8*   PLATELETS 10*3/mm3 278 313     Results from last 7 days   Lab Units  "05/13/24  1416   SODIUM mmol/L 138   POTASSIUM mmol/L 3.7   CHLORIDE mmol/L 102   CO2 mmol/L 24.0   BUN mg/dL 10   CREATININE mg/dL 0.95   GLUCOSE mg/dL 118*   CALCIUM mg/dL 8.4*         Results from last 7 days   Lab Units 05/13/24  1416   SED RATE mm/hr 50*     Results from last 7 days   Lab Units 05/13/24  1416   CRP mg/dL 3.93*     Results from last 7 days   Lab Units 05/13/24  1416   LACTATE mmol/L 1.6             Estimated Creatinine Clearance: 84.6 mL/min (by C-G formula based on SCr of 0.95 mg/dL).      Microbiology:  Blood Culture   Date Value Ref Range Status   05/13/2024 No growth at 24 hours  Preliminary   05/13/2024 No growth at 24 hours  Preliminary     No results found for: \"BCIDPCR\", \"CXREFLEX\", \"CSFCX\", \"CULTURETIS\"  No results found for: \"CULTURES\", \"HSVCX\", \"URCX\"  No results found for: \"EYECULTURE\", \"GCCX\", \"HSVCULTURE\", \"LABHSV\"  No results found for: \"LEGIONELLA\", \"MRSACX\", \"MUMPSCX\", \"MYCOPLASCX\"  No results found for: \"NOCARDIACX\", \"STOOLCX\"  No results found for: \"THROATCX\", \"UNSTIMCULT\", \"URINECX\", \"CULTURE\", \"VZVCULTUR\"  No results found for: \"VIRALCULTU\", \"WOUNDCX\"        Radiology:  Imaging Results (Last 72 Hours)       Procedure Component Value Units Date/Time    XR Hip With or Without Pelvis 2 - 3 View Left [[id number]] Collected: 05/14/24 1819     Updated: 05/14/24 1827    Narrative:      DATE OF EXAM: 5/14/2024 6:03 PM     PROCEDURE: XR HIP W OR WO PELVIS 2-3 VIEW LEFT-     INDICATIONS: post-op left NAVNEET, I&D with head and liner exchange;  M96.842-Postprocedural seroma of a musculoskeletal structure following a  musculoskeletal system procedure; Z96.642-Presence of left artificial  hip joint     COMPARISON: Same-day radiographs     TECHNIQUE: Two views of the left hip with Pelvis were obtained.     FINDINGS:  Left hip arthroplasty is in normal alignment. No evidence of new  hardware complication. The bony pelvis is intact with normal alignment.  Moderate right hip arthritis. Expected " surgical changes in the left hip  soft tissues. Chronic deformity of the proximal left femur     IMPRESSION   Left hip arthroplasty without evidence of hardware complication.         This report was finalized on 2024 6:24 PM by Ernesto Wong MD.       FL C Arm During Surgery [[id number]] Resulted: 24 170     Updated: 24    Narrative:      This procedure was auto-finalized with no dictation required.    XR Hip With or Without Pelvis 2 - 3 View Left [[id number]] Collected: 24 1252     Updated: 24 1257    Narrative:      DATE OF EXAM: 2024 12:34 PM     PROCEDURE: XR HIP W OR WO PELVIS 2-3 VIEW LEFT-     INDICATIONS: s/p left NAVNEET     COMPARISON: 2024.     TECHNIQUE: Two views of the left hip with Pelvis were obtained.     FINDINGS:  3 films. There is a total left hip arthroplasty in normal anatomic  alignment. There is old fracture deformity of the proximal left femur.  There are no hardware complications. There is mild narrowing of the  right hip joint. The right femoral head has a normal rounded contour and  is normally seated within the acetabulum.       Impression:      Normal-appearing left hip arthroplasty.         This report was finalized on 2024 12:53 PM by Jessica Garay MD.                 Impression:    1.  Left total hip arthroplasty infection-the presentation is suggestive of left total hip arthroplasty infection.  His cultures are pending but are antibiotic modified due to of cephalexin.  I will plan to start him on intravenous ceftriaxone and daptomycin therapy.  He will likely require a prolonged course of intravenous antibiotic therapy.  2.  Status post left total hip arthroplasty-2024  3.  Elevated inflammatory markers  4.  Gastroesophageal reflux      PLAN/RECOMMENDATIONS:   Thank you for asking us to see Papito Salinas, I recommend the followin.  Left hip debridement with poly exchange-done  2.  Left hip cultures-pending  3.  Daptomycin  750 mg IV daily  4.  Ceftriaxone 2 g IV daily     I discussed his complex situation in detail with him and his wife today.  I discussed his complex situation with Dr. Durbin today.       Behzad Dumont MD  5/15/2024  07:10 EDT

## 2024-05-15 NOTE — PLAN OF CARE
Problem: Adult Inpatient Plan of Care  Goal: Plan of Care Review  Outcome: Ongoing, Not Progressing  Flowsheets (Taken 5/14/2024 1337 by Deja Gandara RN Extern)  Plan of Care Reviewed With: patient  Outcome Evaluation: patient accepting of procedure, family at bedside for support  Goal: Patient-Specific Goal (Individualized)  Outcome: Ongoing, Not Progressing  Goal: Absence of Hospital-Acquired Illness or Injury  Outcome: Ongoing, Not Progressing  Intervention: Identify and Manage Fall Risk  Recent Flowsheet Documentation  Taken 5/15/2024 0200 by Lexy Coley RN  Safety Promotion/Fall Prevention: safety round/check completed  Taken 5/15/2024 0000 by Lexy Coley RN  Safety Promotion/Fall Prevention: safety round/check completed  Taken 5/14/2024 2200 by Lexy oCley RN  Safety Promotion/Fall Prevention: safety round/check completed  Taken 5/14/2024 2000 by Lexy Coley RN  Safety Promotion/Fall Prevention:   activity supervised   safety round/check completed  Intervention: Prevent Skin Injury  Recent Flowsheet Documentation  Taken 5/15/2024 0200 by Lexy Coley RN  Body Position: position changed independently  Skin Protection:   adhesive use limited   tubing/devices free from skin contact  Taken 5/15/2024 0000 by Lexy Coley RN  Body Position: position changed independently  Skin Protection: adhesive use limited  Taken 5/14/2024 2200 by Lexy Coley RN  Body Position: supine, legs elevated  Skin Protection:   adhesive use limited   tubing/devices free from skin contact  Taken 5/14/2024 2000 by Lexy Coley RN  Body Position: supine, legs elevated  Skin Protection:   adhesive use limited   tubing/devices free from skin contact  Intervention: Prevent and Manage VTE (Venous Thromboembolism) Risk  Recent Flowsheet Documentation  Taken 5/14/2024 2000 by Lexy Coley RN  VTE Prevention/Management:   bilateral   sequential compression devices on  Intervention:  Prevent Infection  Recent Flowsheet Documentation  Taken 5/15/2024 0200 by Lexy Coley RN  Infection Prevention: cohorting utilized  Taken 5/15/2024 0000 by Lexy Coley RN  Infection Prevention: cohorting utilized  Taken 5/14/2024 2200 by Lexy Coley RN  Infection Prevention: cohorting utilized  Taken 5/14/2024 2000 by Lexy Coley RN  Infection Prevention: cohorting utilized  Goal: Optimal Comfort and Wellbeing  Outcome: Ongoing, Not Progressing  Goal: Readiness for Transition of Care  Outcome: Ongoing, Not Progressing     Problem: Asthma Comorbidity  Goal: Maintenance of Asthma Control  Outcome: Ongoing, Not Progressing  Intervention: Maintain Asthma Symptom Control  Recent Flowsheet Documentation  Taken 5/14/2024 2200 by Lexy Coley RN  Medication Review/Management: medications reviewed  Taken 5/14/2024 2000 by Lexy Coley RN  Medication Review/Management: medications reviewed     Problem: Behavioral Health Comorbidity  Goal: Maintenance of Behavioral Health Symptom Control  Outcome: Ongoing, Not Progressing  Intervention: Maintain Behavioral Health Symptom Control  Recent Flowsheet Documentation  Taken 5/14/2024 2200 by Lexy Coley RN  Medication Review/Management: medications reviewed  Taken 5/14/2024 2000 by Lexy Coley RN  Medication Review/Management: medications reviewed     Problem: COPD (Chronic Obstructive Pulmonary Disease) Comorbidity  Goal: Maintenance of COPD Symptom Control  Outcome: Ongoing, Not Progressing  Intervention: Maintain COPD-Symptom Control  Recent Flowsheet Documentation  Taken 5/14/2024 2200 by Lexy Coley RN  Medication Review/Management: medications reviewed  Taken 5/14/2024 2000 by Lexy Coley RN  Supportive Measures: active listening utilized  Medication Review/Management: medications reviewed     Problem: Diabetes Comorbidity  Goal: Blood Glucose Level Within Targeted Range  Outcome: Ongoing, Not Progressing      Problem: Heart Failure Comorbidity  Goal: Maintenance of Heart Failure Symptom Control  Outcome: Ongoing, Not Progressing  Intervention: Maintain Heart Failure-Management  Recent Flowsheet Documentation  Taken 5/14/2024 2200 by Lexy Coley RN  Medication Review/Management: medications reviewed  Taken 5/14/2024 2000 by Lexy Coley RN  Medication Review/Management: medications reviewed     Problem: Hypertension Comorbidity  Goal: Blood Pressure in Desired Range  Outcome: Ongoing, Not Progressing  Intervention: Maintain Blood Pressure Management  Recent Flowsheet Documentation  Taken 5/14/2024 2200 by Lexy Coley RN  Medication Review/Management: medications reviewed  Taken 5/14/2024 2000 by Lexy Coley RN  Medication Review/Management: medications reviewed     Problem: Obstructive Sleep Apnea Risk or Actual Comorbidity Management  Goal: Unobstructed Breathing During Sleep  Outcome: Ongoing, Not Progressing     Problem: Osteoarthritis Comorbidity  Goal: Maintenance of Osteoarthritis Symptom Control  Outcome: Ongoing, Not Progressing  Intervention: Maintain Osteoarthritis Symptom Control  Recent Flowsheet Documentation  Taken 5/14/2024 2200 by Lexy Coley RN  Medication Review/Management: medications reviewed  Taken 5/14/2024 2000 by Lexy Coley RN  Medication Review/Management: medications reviewed     Problem: Pain Chronic (Persistent) (Comorbidity Management)  Goal: Acceptable Pain Control and Functional Ability  Outcome: Ongoing, Not Progressing  Intervention: Manage Persistent Pain  Recent Flowsheet Documentation  Taken 5/14/2024 2200 by Lexy Coley RN  Medication Review/Management: medications reviewed  Taken 5/14/2024 2000 by Lexy Coley RN  Medication Review/Management: medications reviewed  Intervention: Optimize Psychosocial Wellbeing  Recent Flowsheet Documentation  Taken 5/14/2024 2000 by Lexy Coley RN  Supportive Measures: active listening  utilized     Problem: Seizure Disorder Comorbidity  Goal: Maintenance of Seizure Control  Outcome: Ongoing, Not Progressing     Problem: Fall Injury Risk  Goal: Absence of Fall and Fall-Related Injury  Outcome: Ongoing, Not Progressing  Intervention: Identify and Manage Contributors  Recent Flowsheet Documentation  Taken 5/14/2024 2200 by Lexy Coley RN  Medication Review/Management: medications reviewed  Taken 5/14/2024 2000 by Lexy Coley RN  Medication Review/Management: medications reviewed  Self-Care Promotion: independence encouraged  Intervention: Promote Injury-Free Environment  Recent Flowsheet Documentation  Taken 5/15/2024 0200 by Lexy Coley RN  Safety Promotion/Fall Prevention: safety round/check completed  Taken 5/15/2024 0000 by Lexy Coley RN  Safety Promotion/Fall Prevention: safety round/check completed  Taken 5/14/2024 2200 by Lexy Coley RN  Safety Promotion/Fall Prevention: safety round/check completed  Taken 5/14/2024 2000 by Lexy Coley RN  Safety Promotion/Fall Prevention:   activity supervised   safety round/check completed   Goal Outcome Evaluation:

## 2024-05-15 NOTE — THERAPY TREATMENT NOTE
Patient Name: Papito Salinas  : 1945    MRN: [id number]                              Today's Date: 5/15/2024       Admit Date: 2024    Visit Dx:     ICD-10-CM ICD-9-CM   1. Seroma of musculoskeletal structure after musculoskeletal system procedure  M96.842 998.13   2. 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    Status post total hip replacement, left    S/P debridement left hip wound with head and liner exchange     Past Medical History:   Diagnosis Date    Arthritis     Edema     Lower extremities    GERD (gastroesophageal reflux disease)     History of transfusion     Hypertension     Insomnia     Plantar fascial fibromatosis     Tibia fracture     Left     Past Surgical History:   Procedure Laterality Date    CATARACT EXTRACTION Bilateral     COLONOSCOPY      INCISION AND DRAINAGE HIP Left 2024    Procedure: TOTAL HIP INCISION AND DRAINAGE, POLY EXCHANGE - LEFT;  Surgeon: Sumanth Durbin MD;  Location: [facility] OR;  Service: Orthopedics;  Laterality: Left;    TOTAL HIP ARTHROPLASTY Left 2024    Procedure: TOTAL HIP ARTHROPLASTY ANTERIOR MODIFIED LEFT;  Surgeon: Sumanth Durbin MD;  Location: [facility] OR;  Service: Orthopedics;  Laterality: Left;    VEIN SURGERY Left     Vein Stripping      General Information       Row Name 05/15/24 1515 05/15/24 1015       Physical Therapy Time and Intention    Document Type therapy note (daily note)  -AB evaluation  -AB    Mode of Treatment  physical therapy  -AB physical therapy  -AB      Row Name 05/15/24 1515 05/15/24 1015       General Information    Patient Profile Reviewed yes  -AB yes  -AB    Prior Level of Function -- all household mobility;community mobility;gait;transfer;bed mobility;ADL's;min assist:  Current with OPPT. Progressing with mobility since [facility] on 4/23/24.  -AB    Existing Precautions/Restrictions fall;other (see comments);left;hip, anterior  L [facility] 4/23; 5/14 I&D with head and liner exchange; wound vac  -AB fall;other (see comments);left;hip, anterior  L [facility] 4/23; 5/14 I&D with head and liner exchange; wound vac  -AB    Barriers to Rehab medically complex  -AB medically complex  -AB      Row Name 05/15/24 1015          Living Environment    People in Home spouse  -AB       Row Name 05/15/24 1015          Home Main Entrance    Number of Stairs, Main Entrance other (see comments)  15 steps from garage to main level.  -AB     Stair Railings, Main Entrance railings on both sides of stairs  -AB       Row Name 05/15/24 1015          Stairs Within Home, Primary    Number of Stairs, Within Home, Primary none  -AB       Row Name 05/15/24 1515 05/15/24 1015       Cognition    Orientation Status (Cognition) oriented x 4  -AB oriented x 4  -AB      Row Name 05/15/24 1515 05/15/24 1015       Safety Issues, Functional Mobility    Safety Issues Affecting Function (Mobility) awareness of need for assistance;insight into deficits/self-awareness;safety precaution awareness;safety precautions follow-through/compliance;sequencing abilities  -AB insight into deficits/self-awareness;safety precaution awareness;safety precautions follow-through/compliance  -AB    Impairments Affecting Function (Mobility) endurance/activity tolerance;pain;strength;range of motion (ROM)  -AB endurance/activity tolerance;pain;strength;range of motion (ROM)  -AB              User Key  (r) = Recorded By, (t) = Taken By, (c) = Cosigned By      Initials Name Provider Type    AB  Seble Mata, PT Physical Therapist                   Mobility       Row Name 05/15/24 1515 05/15/24 1022       Bed Mobility    Bed Mobility sit-supine  -AB supine-sit;scooting/bridging  -AB    Scooting/Bridging George (Bed Mobility) -- contact guard;1 person assist;verbal cues  -AB    Supine-Sit [place] (Bed Mobility) -- minimum assist (75% patient effort);1 person assist;verbal cues  -AB    Sit-Supine [place] (Bed Mobility) minimum assist (75% patient effort);1 person assist;verbal cues  -AB --    Assistive Device (Bed Mobility) head of bed elevated  -AB head of bed elevated  -AB    Comment, (Bed Mobility) Assist at LLE to bring legs into bed.  -AB Assist to advance LLE to EOB secondary to pain.  -AB      Row Name 05/15/24 1515 05/15/24 1022       Transfers    Comment, (Transfers) Cues for hand placement and sequencing.  -AB Cues for hand placement and sequencing. Increased pain throughout.  -AB      Row Name 05/15/24 1515 05/15/24 1022       Sit-Stand Transfer    Sit-Stand George (Transfers) contact guard;1 person assist;verbal cues  -AB contact guard;1 person assist;verbal cues  -AB    Assistive Device (Sit-Stand Transfers) walker, front-wheeled  -AB walker, front-wheeled  -AB    Comment, (Sit-Stand Transfer) Cues to advance RLE priort to stand for comfort. Increased time/effort to stand from chair.  -AB Cues to advance RLE prior to stand for comfort.  -AB      Row Name 05/15/24 1515 05/15/24 1022       Gait/Stairs (Locomotion)    George Level (Gait) contact guard;1 person assist;verbal cues  -AB contact guard;1 person assist;verbal cues  -AB    Assistive Device (Gait) walker, front-wheeled  -AB walker, front-wheeled  -AB    Patient was able to Ambulate -- yes  -AB    Distance in Feet (Gait) 250  -  -AB    Deviations/Abnormal Patterns (Gait) bilateral deviations;soraya decreased;gait speed decreased;stride length decreased  -AB bilateral deviations;soraya decreased;gait  speed decreased;stride length decreased  -AB    Bilateral Gait Deviations forward flexed posture;heel strike decreased  -AB forward flexed posture;heel strike decreased  -AB    Left Sided Gait Deviations weight shift ability decreased  -AB weight shift ability decreased  -AB    Comment, (Gait/Stairs) Pt ambulated with step through gait pattern, slowed soraya. and decreased weight acceptance onto LLE. Improved posture and step length noted this afternoon. Gait mechanics improved with repitition. No overt LOB. Further activity limited by fatigue and pain.  -AB Pt ambulated with step through gait pattern, slowed soraya, and decreased weight acceptance onto LLE. Cues for upright posture and keeping walker closer to body. No overt LOB or knee buckling. Further activity limited by fatigue and L groin pain.  -AB      Row Name 05/15/24 1515 05/15/24 1022       Mobility    Extremity Weight-bearing Status left lower extremity  -AB left lower extremity  -AB    Left Lower Extremity (Weight-bearing Status) weight-bearing as tolerated (WBAT)  -AB weight-bearing as tolerated (WBAT)  -AB              User Key  (r) = Recorded By, (t) = Taken By, (c) = Cosigned By      Initials Name Provider Type    AB Seble Mata, PT Physical Therapist                   Obj/Interventions       Row Name 05/15/24 1030          Range of Motion Comprehensive    General Range of Motion bilateral lower extremity ROM WFL  -AB       Row Name 05/15/24 1030          Strength Comprehensive (MMT)    General Manual Muscle Testing (MMT) Assessment lower extremity strength deficits identified  -AB     Comment, General Manual Muscle Testing (MMT) Assessment Pt able to perform active DF bilaterally. Unable to complete L active SLR secondary to pain but demonstrated strong quad set so KI deferred.  -AB       Row Name 05/15/24 1517 05/15/24 1030       Motor Skills    Therapeutic Exercise knee;ankle;hip  -AB knee;ankle;hip  -AB      Row Name 05/15/24 1517  05/15/24 1030       Hip (Therapeutic Exercise)    Hip (Therapeutic Exercise) -- strengthening exercise;isometric exercises;AAROM (active assistive range of motion)  -AB    Hip AAROM (Therapeutic Exercise) left;flexion;10 repetitions  -AB left;flexion;10 repetitions  -AB    Hip Isometrics (Therapeutic Exercise) bilateral;gluteal sets;10 repetitions  -AB bilateral;gluteal sets;10 repetitions  -AB    Hip Strengthening (Therapeutic Exercise) left;aBduction;10 repetitions  -AB left;aBduction;10 repetitions  -AB      Row Name 05/15/24 1517 05/15/24 1030       Knee (Therapeutic Exercise)    Knee (Therapeutic Exercise) -- isometric exercises;strengthening exercise  -AB    Knee Isometrics (Therapeutic Exercise) bilateral;quad sets;10 repetitions  -AB bilateral;quad sets;10 repetitions  -AB    Knee Strengthening (Therapeutic Exercise) left;LAQ (long arc quad);heel slides;10 repetitions  -AB left;LAQ (long arc quad);heel slides;10 repetitions  -AB      Row Name 05/15/24 1517 05/15/24 1030       Ankle (Therapeutic Exercise)    Ankle (Therapeutic Exercise) AROM (active range of motion)  -AB AROM (active range of motion)  -AB    Ankle AROM (Therapeutic Exercise) bilateral;dorsiflexion;plantarflexion;10 repetitions  -AB bilateral;dorsiflexion;plantarflexion;10 repetitions  -AB      Row Name 05/15/24 1517 05/15/24 1030       Balance    Balance Assessment sitting static balance;sitting dynamic balance;standing static balance;standing dynamic balance  -AB sitting static balance;standing static balance;standing dynamic balance;sitting dynamic balance  -AB    Static Sitting Balance standby assist  -AB standby assist  -AB    Dynamic Sitting Balance standby assist  -AB standby assist  -AB    Position, Sitting Balance unsupported;sitting edge of bed  -AB unsupported;sitting edge of bed  -AB    Static Standing Balance contact guard  -AB contact guard  -AB    Dynamic Standing Balance contact guard;1-person assist;verbal cues  -AB contact  guard;1-person assist;verbal cues  -AB    Position/Device Used, Standing Balance supported;walker, front-wheeled  -AB supported;walker, front-wheeled  -AB    Balance Interventions sitting;standing;sit to stand;supported;static;dynamic;occupation based/functional task  -AB sitting;standing;sit to stand;supported;static;dynamic;occupation based/functional task  -AB    Comment, Balance No overt LOB or knee buckling.  -AB No overt LOB or knee buckling.  -AB      Row Name 05/15/24 1030          Sensory Assessment (Somatosensory)    Sensory Assessment (Somatosensory) LE sensation intact  -AB               User Key  (r) = Recorded By, (t) = Taken By, (c) = Cosigned By      Initials Name Provider Type    AB Seble Mata, PT Physical Therapist                   Goals/Plan       Row Name 05/15/24 1036          Bed Mobility Goal 1 (PT)    Activity/Assistive Device (Bed Mobility Goal 1, PT) sit to supine;supine to sit  -AB     [place] Level/Cues Needed (Bed Mobility Goal 1, PT) independent  -AB     Time Frame (Bed Mobility Goal 1, PT) long term goal (LTG);3 days  -AB       Row Name 05/15/24 1036          Transfer Goal 1 (PT)    Activity/Assistive Device (Transfer Goal 1, PT) sit-to-stand/stand-to-sit;bed-to-chair/chair-to-bed;walker, rolling  -AB     [place] Level/Cues Needed (Transfer Goal 1, PT) modified independence  -AB     Time Frame (Transfer Goal 1, PT) long term goal (LTG);3 days  -AB       Row Name 05/15/24 1036          Gait Training Goal 1 (PT)    Activity/Assistive Device (Gait Training Goal 1, PT) gait (walking locomotion);assistive device use;walker, rolling  -AB     [place] Level (Gait Training Goal 1, PT) modified independence  -AB     Distance (Gait Training Goal 1, PT) 350  -AB     Time Frame (Gait Training Goal 1, PT) long term goal (LTG);3 days  -AB       Row Name 05/15/24 1036          Stairs Goal 1 (PT)    Activity/Assistive Device (Stairs Goal 1, PT) ascending stairs;descending  stairs;using handrail, right;using handrail, left  -AB     [place] Level/Cues Needed (Stairs Goal 1, PT) contact guard required  -AB     Number of Stairs (Stairs Goal 1, PT) 15  -AB     Time Frame (Stairs Goal 1, PT) long term goal (LTG);3 days  -AB       Row Name 05/15/24 1036          Therapy Assessment/Plan (PT)    Planned Therapy Interventions (PT) balance training;bed mobility training;gait training;patient/family education;postural re-education;transfer training;stretching;strengthening;stair training;ROM (range of motion)  -AB               User Key  (r) = Recorded By, (t) = Taken By, (c) = Cosigned By      Initials Name Provider Type    AB Seble Mata, PT Physical Therapist                   Clinical Impression       Row Name 05/15/24 1519 05/15/24 1032       Pain    Pretreatment Pain Rating 6/10  -AB 6/10  -AB    Posttreatment Pain Rating 8/10  -AB 6/10  -AB    Pain Location - Side/Orientation Left  -AB Left  -AB    Pain Location generalized  -AB generalized  -AB    Pain Location - hip  -AB groin  -AB    Pre/Posttreatment Pain Comment Pain at L groin.  -AB Pt reports majority of pain is in groin area.  -AB    Pain Intervention(s) Ambulation/increased activity;Elevated;Repositioned;Cold applied;Nursing Notified  -AB Ambulation/increased activity;Elevated;Repositioned;Nursing Notified  -AB    Additional Documentation Pain Scale: Numbers Pre/Post-Treatment (Group)  -AB Pain Scale: Numbers Pre/Post-Treatment (Group)  -AB      Row Name 05/15/24 1519 05/15/24 1032       Plan of Care Review    Plan of Care Reviewed With patient;spouse;son  -AB patient  -AB    Progress improving  -AB no change  -AB    Outcome Evaluation Pt with good effort and increased ambulation distance to 250' with CGA and RW. HEP completed. Pt continues to present below his functional baseline with weakness, acute pain, and impaired endurance. Further IPPT is warrented. PT will progress as able per POC.  -AB PT initial eval completed.  Pt presents below his functional baseline with acute pain, weakness, and impaired endurance. Ambulation of 190' with CGA and RW was well tolerated. No overt LOB or knee buckling. HEP completed. Further IPPT is warrented. PT rec d/c home with assist and OPPT when medically appropriate.  -AB      Row Name 05/15/24 1032          Therapy Assessment/Plan (PT)    Rehab Potential (PT) good, to achieve stated therapy goals  -AB     Criteria for Skilled Interventions Met (PT) yes;meets criteria;skilled treatment is necessary  -AB     Therapy Frequency (PT) 2 times/day  -AB       Row Name 05/15/24 1519 05/15/24 1032       Vital Signs    Pre Systolic BP Rehab 166  -  -AB    Pre Treatment Diastolic BP 77  -AB 95  -AB    Post Systolic BP Rehab -- 186   -AB    Post Treatment Diastolic BP -- 85   -AB    Posttreatment Heart Rate (beats/min) -- 76  -AB    Pre SpO2 (%) 93  -AB 95  -AB    O2 Delivery Pre Treatment room air  -AB room air  -AB    O2 Delivery Intra Treatment room air  -AB room air  -AB    Post SpO2 (%) 90  -AB 93  -AB    O2 Delivery Post Treatment room air  -AB room air  -AB    Pre Patient Position Sitting  -AB Supine  -AB    Intra Patient Position Standing  -AB Standing  -AB    Post Patient Position Supine  -AB Sitting  -AB      Row Name 05/15/24 1519 05/15/24 1032       Positioning and Restraints    Pre-Treatment Position sitting in chair/recliner  -AB in bed  -AB    Post Treatment Position bed  -AB chair  -AB    In Bed notified nsg;supine;call light within reach;encouraged to call for assist;exit alarm on;with family/caregiver;SCD pump applied  -AB --    In Chair -- notified nsg;reclined;sitting;call light within reach;encouraged to call for assist;exit alarm on;legs elevated;compression device  -AB              User Key  (r) = Recorded By, (t) = Taken By, (c) = Cosigned By      Initials Name Provider Type    AB Seble Mata, PT Physical Therapist                   Outcome Measures       Row Name 05/15/24  1522 05/15/24 1037       How much help from another person do you currently need...    Turning from your back to your side while in flat bed without using bedrails? 4  -AB 4  -AB    Moving from lying on back to sitting on the side of a flat bed without bedrails? 3  -AB 3  -AB    Moving to and from a bed to a chair (including a wheelchair)? 3  -AB 3  -AB    Standing up from a chair using your arms (e.g., wheelchair, bedside chair)? 3  -AB 3  -AB    Climbing 3-5 steps with a railing? 3  -AB 3  -AB    To walk in hospital room? 3  -AB 3  -AB    AM-PAC 6 Clicks Score (PT) 19  -AB 19  -AB    Highest Level of Mobility Goal 6 --> Walk 10 steps or more  -AB 6 --> Walk 10 steps or more  -AB      Row Name 05/15/24 0855          How much help from another person do you currently need...    Turning from your back to your side while in flat bed without using bedrails? 4  -BM     Moving from lying on back to sitting on the side of a flat bed without bedrails? 4  -BM     Moving to and from a bed to a chair (including a wheelchair)? 4  -BM     Standing up from a chair using your arms (e.g., wheelchair, bedside chair)? 4  -BM     Climbing 3-5 steps with a railing? 3  -BM     To walk in hospital room? 4  -BM     AM-PAC 6 Clicks Score (PT) 23  -BM     Highest Level of Mobility Goal 7 --> Walk 25 feet or more  -BM       Row Name 05/15/24 1037          PADD    Diagnosis 2  -AB     Gender 2  -AB     Age Group 1  -AB     Gait Distance 1  -AB     Assist Level 1  -AB     Home Support 3  -AB     PADD Score 9  -AB     Patient Preference home with outpatient rehab  -AB     Prediction by PADD Score directly home (with home health or out-patient rehab)  -AB       Row Name 05/15/24 1522 05/15/24 1037       Functional Assessment    Outcome Measure Options AM-PAC 6 Clicks Basic Mobility (PT)  -AB AM-PAC 6 Clicks Basic Mobility (PT);PADD  -AB              User Key  (r) = Recorded By, (t) = Taken By, (c) = Cosigned By      Initials Name Provider Type     AB Seble Mata, PT Physical Therapist     Nguyen Rico, RN Registered Nurse                                 Physical Therapy Education       Title: PT OT SLP Therapies (Done)       Topic: Physical Therapy (Done)       Point: Mobility training (Done)       Learning Progress Summary             Patient Acceptance, E,D, VU,DU by AB at 5/15/2024 1038    Acceptance, E, VU by  at 5/15/2024 0855                         Point: Home exercise program (Done)       Learning Progress Summary             Patient Acceptance, E,D, VU,DU by AB at 5/15/2024 1038    Acceptance, E, VU by  at 5/15/2024 0855                         Point: Body mechanics (Done)       Learning Progress Summary             Patient Acceptance, E,D, VU,DU by AB at 5/15/2024 1038    Acceptance, E, VU by  at 5/15/2024 0855                         Point: Precautions (Done)       Learning Progress Summary             Patient Acceptance, E,D, VU,DU by AB at 5/15/2024 1038    Acceptance, E, VU by  at 5/15/2024 0855                                         User Key       Initials Effective Dates Name Provider Type Discipline    AB 09/22/22 -  Seble Mata, PT Physical Therapist PT     04/08/24 -  Nguyen Rico, RN Registered Nurse Nurse                  PT Recommendation and Plan  Planned Therapy Interventions (PT): balance training, bed mobility training, gait training, patient/family education, postural re-education, transfer training, stretching, strengthening, stair training, ROM (range of motion)  Plan of Care Reviewed With: patient, spouse, son  Progress: improving  Outcome Evaluation: Pt with good effort and increased ambulation distance to 250' with CGA and RW. HEP completed. Pt continues to present below his functional baseline with weakness, acute pain, and impaired endurance. Further IPPT is warrented. PT will progress as able per POC.     Time Calculation:   PT Evaluation Complexity  History, PT Evaluation Complexity: 1-2  personal factors and/or comorbidities  Examination of Body Systems (PT Eval Complexity): total of 3 or more elements  Clinical Presentation (PT Evaluation Complexity): stable  Clinical Decision Making (PT Evaluation Complexity): low complexity  Overall Complexity (PT Evaluation Complexity): low complexity     PT Charges       Row Name 05/15/24 1522 05/15/24 1038          Time Calculation    Start Time 1357  -AB 0939  -AB     PT Received On 05/15/24  -AB 05/15/24  -AB     PT Goal Re-Cert Due Date -- 05/25/24  -AB        Timed Charges    32389 - PT Therapeutic Exercise Minutes 10  -AB 10  -AB     59718 - Gait Training Minutes  10  -AB --     32726 - PT Therapeutic Activity Minutes 3  -AB --        Untimed Charges    PT Eval/Re-eval Minutes -- 48  -AB        Total Minutes    Timed Charges Total Minutes 23  -AB 10  -AB     Untimed Charges Total Minutes -- 48  -AB      Total Minutes 23  -AB 58  -AB               User Key  (r) = Recorded By, (t) = Taken By, (c) = Cosigned By      Initials Name Provider Type    AB Seble Mata, PT Physical Therapist                  Therapy Charges for Today       Code Description Service Date Service Provider Modifiers Qty    72973160144 HC PT THER PROC EA 15 MIN 5/15/2024 Seble Mata, PT GP 1    02438322279 HC PT EVAL LOW COMPLEXITY 4 5/15/2024 Seble Mata, PT GP 1    03129898753 HC PT THER PROC EA 15 MIN 5/15/2024 Seble Mata, PT GP 1    [id number] HC GAIT TRAINING EA 15 MIN 5/15/2024 Seble Mata, PT GP 1            PT G-Codes  Outcome Measure Options: AM-PAC 6 Clicks Basic Mobility (PT)  AM-PAC 6 Clicks Score (PT): 19  PT Discharge Summary  Anticipated Discharge Disposition (PT): home with assist, home with outpatient therapy services    Seble Mata PT  5/15/2024

## 2024-05-15 NOTE — PLAN OF CARE
Goal Outcome Evaluation:  Plan of Care Reviewed With: patient, spouse, son        Progress: improving  Outcome Evaluation: Pt with good effort and increased ambulation distance to 250' with CGA and RW. HEP completed. Pt continues to present below his functional baseline with weakness, acute pain, and impaired endurance. Further IPPT is warrented. PT will progress as able per POC.      Anticipated Discharge Disposition (PT): home with assist, home with outpatient therapy services

## 2024-05-15 NOTE — PROGRESS NOTES
"IM progress note      Papito Salinas  [id number]  1945     LOS: 1 day     Attending: Fabi Pina MD    Primary Care Provider: Dick Brewster MD      Chief Complaint/Reason for visit:  Postop wound infection    Subjective   Doing ok. Complains of moderate groin pain. Denies f/c/n/v/sob/cp.    Objective     Vital Signs  Visit Vitals  /95   Pulse 71   Temp 98 °F (36.7 °C) (Oral)   Resp 16   Ht 185.4 cm (73\")   Wt 110 kg (243 lb)   SpO2 93%   BMI 32.06 kg/m²     Temp (24hrs), Av °F (36.7 °C), Min:97.1 °F (36.2 °C), Max:98.7 °F (37.1 °C)      Nutrition: PO    Respiratory: RA    Physical Therapy: PT initial eval completed. Pt presents below his functional baseline with acute pain, weakness, and impaired endurance. Ambulation of 190' with CGA and RW was well tolerated. No overt LOB or knee buckling. HEP completed. Further IPPT is warrented. PT rec d/c home with assist and OPPT when medically appropriate.     Physical Exam:     General Appearance:    Alert, cooperative, in no acute distress   Head:    Normocephalic, without obvious abnormality, atraumatic    Lungs:     Normal effort, symmetric chest rise, no crepitus, clear to      auscultation bilaterally             Heart:    Regular rhythm and normal rate, normal S1 and S2   Abdomen:     Normal bowel sounds, no masses, no organomegaly, soft        non-tender, non-distended, no guarding, no rebound                tenderness   Extremities:   Left hip wound vac CDI   Pulses:   Pulses palpable and equal bilaterally   Skin:   No bleeding, bruising or rash   Neurologic:   Moves all extremities with no obvious focal motor deficit.  Cranial nerves 2 - 12 grossly intact. Flexion and dorsiflexion intact bilateral feet.       Results Review:     I reviewed the patient's new clinical results.   Results from last 7 days   Lab Units 05/15/24  0614 24  1416   WBC 10*3/mm3 7.96 5.54   HEMOGLOBIN g/dL 11.1* 12.0*   HEMATOCRIT % 34.0* " 36.8*   PLATELETS 10*3/mm3 278 313     Results from last 7 days   Lab Units 05/13/24  1416   SODIUM mmol/L 138   POTASSIUM mmol/L 3.7   CHLORIDE mmol/L 102   CO2 mmol/L 24.0   BUN mg/dL 10   CREATININE mg/dL 0.95   CALCIUM mg/dL 8.4*   GLUCOSE mg/dL 118*      Latest Reference Range & Units 05/13/24 14:16   C-Reactive Protein 0.00 - 0.50 mg/dL 3.93 (H)   Lactate 0.5 - 2.0 mmol/L 1.6   Procalcitonin 0.00 - 0.25 ng/mL 0.08   (H): Data is abnormally high   Latest Reference Range & Units 05/13/24 14:16   Sed Rate 0 - 20 mm/hr 50 (H)   (H): Data is abnormally high    Venous duplex:  Interpretation Summary       No evidence of DVT    Incidentally noted enlarged left groin lymph nodes    I reviewed the patient's new imaging including images and reports.    All medications reviewed.   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 PT/OT- WBAT LLE  2. Pain control-prns   3. IS-encouraged  4. DVT proph- mechs/ASA   5. Bowel regimen  6. Monitor post-op labs  7. DC planning for home when abx arrangements complete     [facility]     HTN   - Continue home Diovan, Hytrin and Toprol  - Monitor BP   - Holding parameters for BP meds  - Labetalol PRN for SBP>170     Gout  -Continue home allopurinol     GERD  -Continue home  PPI      ZEFERINO Sen  05/15/24  12:43 EDT

## 2024-05-16 ENCOUNTER — READMISSION MANAGEMENT (OUTPATIENT)
Dept: CALL CENTER | Facility: HOSPITAL | Age: 79
End: 2024-05-16
Payer: MEDICARE

## 2024-05-16 VITALS
TEMPERATURE: 98.7 F | BODY MASS INDEX: 32.2 KG/M2 | HEIGHT: 73 IN | DIASTOLIC BLOOD PRESSURE: 95 MMHG | OXYGEN SATURATION: 91 % | RESPIRATION RATE: 16 BRPM | WEIGHT: 243 LBS | SYSTOLIC BLOOD PRESSURE: 195 MMHG | HEART RATE: 76 BPM

## 2024-05-16 LAB
CK SERPL-CCNC: 60 U/L (ref 20–200)
DEPRECATED RDW RBC AUTO: 41.8 FL (ref 37–54)
ERYTHROCYTE [DISTWIDTH] IN BLOOD BY AUTOMATED COUNT: 12.7 % (ref 12.3–15.4)
HCT VFR BLD AUTO: 31.7 % (ref 37.5–51)
HGB BLD-MCNC: 10.2 G/DL (ref 13–17.7)
MCH RBC QN AUTO: 29.5 PG (ref 26.6–33)
MCHC RBC AUTO-ENTMCNC: 32.2 G/DL (ref 31.5–35.7)
MCV RBC AUTO: 91.6 FL (ref 79–97)
PLATELET # BLD AUTO: 265 10*3/MM3 (ref 140–450)
PMV BLD AUTO: 10.2 FL (ref 6–12)
RBC # BLD AUTO: 3.46 10*6/MM3 (ref 4.14–5.8)
WBC NRBC COR # BLD AUTO: 6.68 10*3/MM3 (ref 3.4–10.8)

## 2024-05-16 PROCEDURE — 85027 COMPLETE CBC AUTOMATED: CPT | Performed by: ORTHOPAEDIC SURGERY

## 2024-05-16 PROCEDURE — C1894 INTRO/SHEATH, NON-LASER: HCPCS

## 2024-05-16 PROCEDURE — 02HV33Z INSERTION OF INFUSION DEVICE INTO SUPERIOR VENA CAVA, PERCUTANEOUS APPROACH: ICD-10-PCS | Performed by: INTERNAL MEDICINE

## 2024-05-16 PROCEDURE — 82550 ASSAY OF CK (CPK): CPT | Performed by: INTERNAL MEDICINE

## 2024-05-16 PROCEDURE — 97116 GAIT TRAINING THERAPY: CPT

## 2024-05-16 PROCEDURE — C1751 CATH, INF, PER/CENT/MIDLINE: HCPCS

## 2024-05-16 PROCEDURE — 99024 POSTOP FOLLOW-UP VISIT: CPT | Performed by: ORTHOPAEDIC SURGERY

## 2024-05-16 PROCEDURE — 25010000002 DAPTOMYCIN PER 1 MG: Performed by: INTERNAL MEDICINE

## 2024-05-16 PROCEDURE — 97110 THERAPEUTIC EXERCISES: CPT

## 2024-05-16 RX ORDER — SODIUM CHLORIDE 0.9 % (FLUSH) 0.9 %
10 SYRINGE (ML) INJECTION EVERY 12 HOURS SCHEDULED
Status: DISCONTINUED | OUTPATIENT
Start: 2024-05-16 | End: 2024-05-16 | Stop reason: HOSPADM

## 2024-05-16 RX ORDER — SODIUM CHLORIDE 0.9 % (FLUSH) 0.9 %
10 SYRINGE (ML) INJECTION AS NEEDED
Status: DISCONTINUED | OUTPATIENT
Start: 2024-05-16 | End: 2024-05-16 | Stop reason: HOSPADM

## 2024-05-16 RX ADMIN — PANTOPRAZOLE SODIUM 40 MG: 40 TABLET, DELAYED RELEASE ORAL at 05:38

## 2024-05-16 RX ADMIN — ASPIRIN 81 MG: 81 TABLET, COATED ORAL at 08:05

## 2024-05-16 RX ADMIN — Medication 10 MG: at 14:41

## 2024-05-16 RX ADMIN — HYDROCODONE BITARTRATE AND ACETAMINOPHEN 1 TABLET: 5; 325 TABLET ORAL at 14:29

## 2024-05-16 RX ADMIN — METOPROLOL SUCCINATE 100 MG: 100 TABLET, EXTENDED RELEASE ORAL at 08:05

## 2024-05-16 RX ADMIN — ACETAMINOPHEN 1000 MG: 500 TABLET ORAL at 08:04

## 2024-05-16 RX ADMIN — VALSARTAN 160 MG: 160 TABLET, FILM COATED ORAL at 08:05

## 2024-05-16 RX ADMIN — ALLOPURINOL 300 MG: 300 TABLET ORAL at 08:05

## 2024-05-16 RX ADMIN — MESALAMINE 1.2 G: 1.2 TABLET, DELAYED RELEASE ORAL at 08:05

## 2024-05-16 RX ADMIN — DAPTOMYCIN 750 MG: 500 INJECTION, POWDER, LYOPHILIZED, FOR SOLUTION INTRAVENOUS at 11:09

## 2024-05-16 NOTE — PROGRESS NOTES
"      [facility] Orthopaedic Surgery Progress Note    Subjective      LOS: 2 days   Patient Care Team:  Dick Brewster MD as PCP - General    CC: Left hip pain    Interval History:   Resting comfortably in a chair.  Pain controlled.  Waiting to get a PICC line today, then he is ready for discharge.      Objective      Vital Signs  Temp (24hrs), Av.2 °F (36.8 °C), Min:98 °F (36.7 °C), Max:98.3 °F (36.8 °C)      BP (!) 187/95   Pulse 76   Temp 98.3 °F (36.8 °C) (Oral)   Resp 16   Ht 185.4 cm (73\")   Wt 110 kg (243 lb)   SpO2 91%   BMI 32.06 kg/m²     Examination:   Examination of the left hip: The wound suction dressing in place, no drainage.  Knee flexion, knee extension, ankle dorsiflexion, ankle plantar flexion, and EHL are intact.  Sensation intact in the foot to light touch.   Thigh is soft and nontender.      Labs:  Results from last 7 days   Lab Units 24  0527 05/15/24  0614 24  1416   WBC 10*3/mm3 6.68 7.96 5.54   HEMOGLOBIN g/dL 10.2* 11.1* 12.0*   HEMATOCRIT % 31.7* 34.0* 36.8*   MCV fL 91.6 91.4 90.2   PLATELETS 10*3/mm3 265 278 313     Microbiology Results (last 10 days)       Procedure Component Value - Date/Time    Body Fluid Culture - Surgical Site, Hip, Left [[id number]] Collected: 24 1609    Lab Status: Preliminary result Specimen: Surgical Site from Hip, Left Updated: 24 0805     Gram Stain Many (4+) WBCs seen      No organisms seen    Narrative:      Organism under investigation.      AFB Culture - Surgical Site, Hip, Left [[id number]] Collected: 24 1609    Lab Status: Preliminary result Specimen: Surgical Site from Hip, Left Updated: 05/15/24 1128     AFB Stain No acid fast bacilli seen on concentrated smear    Blood Culture - Blood, Hand, Left [[id number]]  (Normal) Collected: 24 1416    Lab Status: Preliminary result Specimen: Blood from Hand, Left Updated: 05/15/24 1500     Blood Culture No growth at 2 days    Blood Culture - Blood, Wrist, Left " [[id number]]  (Normal) Collected: 05/13/24 1416    Lab Status: Preliminary result Specimen: Blood from Wrist, Left Updated: 05/15/24 1500     Blood Culture No growth at 2 days             Radiology:  Imaging Results (Last 24 Hours)       ** No results found for the last 24 hours. **            PT:  Physical Therapy - Plan of Care Review - Outcome Summary:  Outcome Evaluation: Pt. continues to present below baseline function w/generalized weakness, balance deficits and acute pain affecting his ability to safely participate in functional mobility. He performed bed mobility, transfers, ambulated 300' and navigated stairs w/contact guard assist. Activity limited by fatigue. Pt. tolerated ther-ex well. Continue IPPT POC to progress as tolerated. (05/16/24 1118)]       Results Review:     I reviewed the patient's new clinical results.    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  Follow-up in 1 week in the office as planned.      Future Appointments   Date Time Provider Department Center   5/22/2024  9:00 AM Idalia Castro PA-C [facility]   7/23/2024 11:00 AM Dick Brewster MD [facility] Durbin MD  05/16/24  13:10 EDT

## 2024-05-16 NOTE — CASE MANAGEMENT/SOCIAL WORK
Continued Stay Note  [facility]     Patient Name: Papito Salinas  MRN: [id number]  Today's Date: 5/16/2024    Admit Date: 5/13/2024    Plan: home with family   Discharge Plan       Row Name 05/16/24 1029       Plan    Plan Comments  has made the referral to [facility] for PT. Pt has requested to schedule his first visit. He will be followed by [facility] for his daily infusions and PICC care. No other dc needs at this time                   Discharge Codes    No documentation.                       Demi Miles RN

## 2024-05-16 NOTE — OUTREACH NOTE
Prep Survey      Flowsheet Row Responses   [facility] patient discharged from? [place]   Is LACE score < 7 ? Yes   Eligibility [facility]   Date of Admission 05/13/24   Date of Discharge 05/16/24   Discharge Disposition Home or Self Care   Discharge diagnosis TOTAL HIP INCISION AND DRAINAGE, POLY EXCHANGE - LEFT   Does the patient have one of the following disease processes/diagnoses(primary or secondary)? General Surgery   Does the patient have Home health ordered? No   Is there a DME ordered? No   Comments regarding appointments [facility] for daily abx via PICC   Prep survey completed? Yes            Mirlande BARNETT - Registered Nurse

## 2024-05-16 NOTE — PLAN OF CARE
Goal Outcome Evaluation:  Plan of Care Reviewed With: patient        Progress: improving  Outcome Evaluation: Pt. continues to present below baseline function w/generalized weakness, balance deficits and acute pain affecting his ability to safely participate in functional mobility. He performed bed mobility, transfers, ambulated 300' and navigated stairs w/contact guard assist. Activity limited by fatigue. Pt. tolerated ther-ex well. Continue IPPT POC to progress as tolerated.      Anticipated Discharge Disposition (PT): home with assist, home with outpatient therapy services

## 2024-05-16 NOTE — PLAN OF CARE
Goal Outcome Evaluation:   Patient being discharged home this afternoon.

## 2024-05-16 NOTE — THERAPY TREATMENT NOTE
Patient Name: Papito Salinas  : 1945    MRN: [id number]                              Today's Date: 2024       Admit Date: 2024    Visit Dx:     ICD-10-CM ICD-9-CM   1. S/P debridement left hip wound with head and liner exchange  Z98.890 V45.89   2. 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    Status post total hip replacement, left    S/P debridement left hip wound with head and liner exchange     Past Medical History:   Diagnosis Date    Arthritis     Edema     Lower extremities    GERD (gastroesophageal reflux disease)     History of transfusion     Hypertension     Insomnia     Plantar fascial fibromatosis     Tibia fracture     Left     Past Surgical History:   Procedure Laterality Date    CATARACT EXTRACTION Bilateral     COLONOSCOPY      INCISION AND DRAINAGE HIP Left 2024    Procedure: TOTAL HIP INCISION AND DRAINAGE, POLY EXCHANGE - LEFT;  Surgeon: Sumanth Durbin MD;  Location: [facility] OR;  Service: Orthopedics;  Laterality: Left;    TOTAL HIP ARTHROPLASTY Left 2024    Procedure: TOTAL HIP ARTHROPLASTY ANTERIOR MODIFIED LEFT;  Surgeon: Sumanth Durbin MD;  Location:  [facility] OR;  Service: Orthopedics;  Laterality: Left;    VEIN SURGERY Left     Vein Stripping      General Information       Row Name 24 1110          Physical Therapy Time and Intention    Document Type  therapy note (daily note)  -     Mode of Treatment physical therapy  -       Row Name 05/16/24 1110          General Information    Patient Profile Reviewed yes  -     Existing Precautions/Restrictions fall;other (see comments);left;hip, anterior  L NAVNEET 4/23; 5/14 I&D with head and liner exchange; wound vac  -     Barriers to Rehab medically complex  -       Row Name 05/16/24 1110          Home Main Entrance    Number of Stairs, Main Entrance seven;other (see comments)  7+7  -SS     Stair Railings, Main Entrance railing on left side (ascending);railing on right side (ascending)  alternates  -       Row Name 05/16/24 1110          Cognition    Orientation Status (Cognition) oriented x 4  -       Row Name 05/16/24 1110          Safety Issues, Functional Mobility    Safety Issues Affecting Function (Mobility) awareness of need for assistance;insight into deficits/self-awareness;judgment;positioning of assistive device;problem-solving;safety precaution awareness;safety precautions follow-through/compliance;sequencing abilities  -     Impairments Affecting Function (Mobility) endurance/activity tolerance;pain;strength;range of motion (ROM);balance;postural/trunk control  -               User Key  (r) = Recorded By, (t) = Taken By, (c) = Cosigned By      Initials Name Provider Type     Stephanie Doan PT Physical Therapist                   Mobility       Row Name 05/16/24 1111          Bed Mobility    Bed Mobility scooting/bridging;supine-sit  -SS     Scooting/Bridging [place] (Bed Mobility) contact guard;verbal cues  -     Supine-Sit [place] (Bed Mobility) minimum assist (75% patient effort);verbal cues  -     Assistive Device (Bed Mobility) bed rails;head of bed elevated  -     Comment, (Bed Mobility) VC for sequencing  -       Row Name 05/16/24 1111          Sit-Stand Transfer    Sit-Stand [place] (Transfers) contact guard;verbal cues  -     Assistive Device (Sit-Stand  Transfers) walker, front-wheeled  -     Comment, (Sit-Stand Transfer) VC for hand placement, appropriate alignment, stepping out LLE, lowering with eccentric control  -       Row Name 05/16/24 1111          Gait/Stairs (Locomotion)    [place] Level (Gait) contact guard;verbal cues  -     Assistive Device (Gait) walker, front-wheeled  -SS     Patient was able to Ambulate yes  -     Distance in Feet (Gait) 300  -     Deviations/Abnormal Patterns (Gait) bilateral deviations;soraya decreased;gait speed decreased;stride length decreased  -     Bilateral Gait Deviations forward flexed posture;heel strike decreased  -     [place] Level (Stairs) verbal cues;contact guard  -     Assistive Device (Stairs) cane, straight  -     Handrail Location (Stairs) left side (ascending);right side (ascending)  -     Number of Steps (Stairs) 5  pt. declined further  -     Ascending Technique (Stairs) step-to-step  -SS     Descending Technique (Stairs) step-to-step  -SS     Comment, (Gait/Stairs) Pt. ambulated with a step through gait pattern. VC for upright posture, AD management, hip/knee flexion w/heel toe gait pattern. Activity limited by fatigue. He performed stair navigation w/cueing for AD management, sequencing w/RLE leading in ascending and LLE leading in descending. No buckling or LOB noted.  -       Row Name 05/16/24 1111          Mobility    Extremity Weight-bearing Status left lower extremity  -     Left Lower Extremity (Weight-bearing Status) weight-bearing as tolerated (WBAT)  -               User Key  (r) = Recorded By, (t) = Taken By, (c) = Cosigned By      Initials Name Provider Type     Stephanie Doan PT Physical Therapist                   Obj/Interventions       Row Name 05/16/24 1116          Motor Skills    Therapeutic Exercise hip;knee;ankle  -       Row Name 05/16/24 1116          Hip (Therapeutic Exercise)    Hip (Therapeutic Exercise) strengthening exercise;isometric  exercises  -     Hip Isometrics (Therapeutic Exercise) bilateral;gluteal sets;10 repetitions  -     Hip Strengthening (Therapeutic Exercise) left;aBduction;heel slides;10 repetitions  -       Row Name 05/16/24 1116          Knee (Therapeutic Exercise)    Knee (Therapeutic Exercise) isometric exercises;strengthening exercise  -     Knee Isometrics (Therapeutic Exercise) bilateral;quad sets;10 repetitions  -     Knee Strengthening (Therapeutic Exercise) left;LAQ (long arc quad);10 repetitions  -       Row Name 05/16/24 1116          Ankle (Therapeutic Exercise)    Ankle (Therapeutic Exercise) AROM (active range of motion)  -     Ankle AROM (Therapeutic Exercise) bilateral;dorsiflexion;plantarflexion;10 repetitions  -       Row Name 05/16/24 1116          Balance    Balance Assessment sitting static balance;sitting dynamic balance;sit to stand dynamic balance;standing static balance;standing dynamic balance  -     Static Sitting Balance standby assist  -     Dynamic Sitting Balance standby assist  -     Position, Sitting Balance unsupported;sitting edge of bed  -     Sit to Stand Dynamic Balance contact guard  -     Static Standing Balance contact guard  -     Dynamic Standing Balance contact guard  -     Position/Device Used, Standing Balance supported;walker, front-wheeled  -     Balance Interventions sitting;standing;sit to stand;supported;static;dynamic  -               User Key  (r) = Recorded By, (t) = Taken By, (c) = Cosigned By      Initials Name Provider Type    SS Stephanie Doan PT Physical Therapist                   Goals/Plan    No documentation.                  Clinical Impression       Row Name 05/16/24 1118          Pain    Pretreatment Pain Rating 0/10 - no pain  -     Posttreatment Pain Rating 5/10  -SS     Pain Location - Side/Orientation Left  -     Pain Location generalized  -     Pain Location - hip  -     Pain Intervention(s) Cold  applied;Repositioned;Ambulation/increased activity;Elevated  -     Additional Documentation Pain Scale: Numbers Pre/Post-Treatment (Group)  -       Row Name 05/16/24 1118          Plan of Care Review    Plan of Care Reviewed With patient  -SS     Progress improving  -     Outcome Evaluation Pt. continues to present below baseline function w/generalized weakness, balance deficits and acute pain affecting his ability to safely participate in functional mobility. He performed bed mobility, transfers, ambulated 300' and navigated stairs w/contact guard assist. Activity limited by fatigue. Pt. tolerated ther-ex well. Continue IPPT POC to progress as tolerated.  -       Row Name 05/16/24 1118          Therapy Assessment/Plan (PT)    Rehab Potential (PT) good, to achieve stated therapy goals  -     Criteria for Skilled Interventions Met (PT) yes;meets criteria;skilled treatment is necessary  -     Therapy Frequency (PT) 2 times/day  -       Row Name 05/16/24 1118          Vital Signs    Pre Systolic BP Rehab 160  -SS     Pre Treatment Diastolic BP 77  -SS     Posttreatment Heart Rate (beats/min) 75  -SS     Pre SpO2 (%) 96  -SS     O2 Delivery Pre Treatment room air  -     Pre Patient Position Supine  -       Row Name 05/16/24 1118          Positioning and Restraints    Pre-Treatment Position in bed  -     Post Treatment Position chair  -SS     In Chair notified nsg;reclined;call light within reach;encouraged to call for assist;exit alarm on;legs elevated  -               User Key  (r) = Recorded By, (t) = Taken By, (c) = Cosigned By      Initials Name Provider Type     Stephanie Doan, PT Physical Therapist                   Outcome Measures       Row Name 05/16/24 1314 05/16/24 0815       How much help from another person do you currently need...    Turning from your back to your side while in flat bed without using bedrails? 4  -SS 4  -TB (r) BM (t) TB (c)    Moving from lying on back to sitting  on the side of a flat bed without bedrails? 3  -SS 3  -TB (r) BM (t) TB (c)    Moving to and from a bed to a chair (including a wheelchair)? 3  -SS 3  -TB (r) BM (t) TB (c)    Standing up from a chair using your arms (e.g., wheelchair, bedside chair)? 3  -SS 3  -TB (r) BM (t) TB (c)    Climbing 3-5 steps with a railing? 3  -SS 3  -TB (r) BM (t) TB (c)    To walk in hospital room? 3  -SS 3  -TB (r) BM (t) TB (c)    AM-PAC 6 Clicks Score (PT) 19  - 19  -BM    Highest Level of Mobility Goal 6 --> Walk 10 steps or more  - 6 --> Walk 10 steps or more  -BM      Row Name 05/16/24 1314          Functional Assessment    Outcome Measure Options AM-PAC 6 Clicks Basic Mobility (PT)  -               User Key  (r) = Recorded By, (t) = Taken By, (c) = Cosigned By      Initials Name Provider Type    TB Esteban Gallegos, RN Registered Nurse     Stephanie Doan, PT Physical Therapist     Nguyen Rico, RN Registered Nurse                                 Physical Therapy Education       Title: PT OT SLP Therapies (Done)       Topic: Physical Therapy (Done)       Point: Mobility training (Done)       Learning Progress Summary             Patient Eager, E, VU,DU,NR by  at 5/16/2024 1319    Comment: Reviewed safety/technique w/bed mobility, transfers, ambulation, stair navigation, HEP, car transfers, hip precautions, PT POC    Acceptance, E,D, VU,DU by AB at 5/15/2024 1038    Acceptance, E, VU by  at 5/15/2024 0855                         Point: Home exercise program (Done)       Learning Progress Summary             Patient Eager, E, VU,DU,NR by  at 5/16/2024 1319    Comment: Reviewed safety/technique w/bed mobility, transfers, ambulation, stair navigation, HEP, car transfers, hip precautions, PT POC    Acceptance, E,D, VU,DU by AB at 5/15/2024 1038    Acceptance, E, VU by  at 5/15/2024 0855                         Point: Body mechanics (Done)       Learning Progress Summary             Patient Eager, E, MARIYA RIVERA,NR  by  at 5/16/2024 1319    Comment: Reviewed safety/technique w/bed mobility, transfers, ambulation, stair navigation, HEP, car transfers, hip precautions, PT POC    Acceptance, E,D, VU,DU by AB at 5/15/2024 1038    Acceptance, E, VU by  at 5/15/2024 0855                         Point: Precautions (Done)       Learning Progress Summary             Patient Eager, E, VU,DU,NR by  at 5/16/2024 1319    Comment: Reviewed safety/technique w/bed mobility, transfers, ambulation, stair navigation, HEP, car transfers, hip precautions, PT POC    Acceptance, E,D, VU,DU by AB at 5/15/2024 1038    Acceptance, E, VU by  at 5/15/2024 0855                                         User Key       Initials Effective Dates Name Provider Type Discipline     06/01/21 -  Stephanie Doan, PT Physical Therapist PT    AB 09/22/22 -  Seble Mata, PT Physical Therapist PT     04/08/24 -  Nguyen Rico, RN Registered Nurse Nurse                  PT Recommendation and Plan     Plan of Care Reviewed With: patient  Progress: improving  Outcome Evaluation: Pt. continues to present below baseline function w/generalized weakness, balance deficits and acute pain affecting his ability to safely participate in functional mobility. He performed bed mobility, transfers, ambulated 300' and navigated stairs w/contact guard assist. Activity limited by fatigue. Pt. tolerated ther-ex well. Continue IPPT POC to progress as tolerated.     Time Calculation:         PT Charges       Row Name 05/16/24 1320             Time Calculation    Start Time 0948  -SS      PT Received On 05/16/24  -SS         Timed Charges    43502 - PT Therapeutic Exercise Minutes 10  -SS      40635 - Gait Training Minutes  12  -SS      40890 - PT Therapeutic Activity Minutes 4  -SS         Total Minutes    Timed Charges Total Minutes 26  -SS       Total Minutes 26  -SS                User Key  (r) = Recorded By, (t) = Taken By, (c) = Cosigned By      Initials Name Provider  Type    SS Stephanie Doan, PT Physical Therapist                  Therapy Charges for Today       Code Description Service Date Service Provider Modifiers Qty    67953664827 HC PT THER PROC EA 15 MIN 5/16/2024 Stephanie Doan, PT GP 1    54224952238 HC GAIT TRAINING EA 15 MIN 5/16/2024 Stephanie Doan, PT GP 1            PT G-Codes  Outcome Measure Options: AM-PAC 6 Clicks Basic Mobility (PT)  AM-PAC 6 Clicks Score (PT): 19  PT Discharge Summary  Anticipated Discharge Disposition (PT): home with assist, home with outpatient therapy services    Stephanie Doan, EVELIO  5/16/2024

## 2024-05-16 NOTE — PROGRESS NOTES
INFECTIOUS DISEASE Progress Note    Papito Salinas  1945  [id number]      Admission Date: 5/13/2024      Requesting Provider: No Known Provider  Evaluating Physician: Behzad Dumont MD    Reason for Consultation:  Left total hip arthroplasty infection    History of present illness:    5/15/24: Patient is a 78 y.o. male who is seen today for a left total hip arthroplasty infection.  He underwent a left hip arthroplasty 2 weeks ago.  On 4/30 he noted a small ulcerated lesion adjacent to his incision.  On 5/9 he noted erythema and swelling of his incision and was started on oral cephalexin.  He subsequently developed increasing cloudy wound drainage.  yesterday he underwentdebridement with poly exchange.  Dr. Durbin indicates that he had a left hip seroma/hematoma with possible infection.   Photos that his wife took demonstrate cloudy drainage and erythema around the incision.  Left hip Gram stain from yesterday reveals many white blood cells with no organisms seen.  Left hip cultures are pending.  Blood cultures are no growth so far from 5/13.  He received perioperative cefazolin.  He has remained afebrile.  C-reactive protein is 3.93 and sedimentation rate is 50.  White blood cell count is 7.96.  He denies increased hip pain.    5/16/2024:  He has been afebrile over the last 24 hours. His left hip cultures have early growth of probable coagulase-negative staph.  I called the microbiology lab to have them pulled the plates and obtain this information this morning.  I asked the microbiology lab to proceed with identification and sensitivities on any isolates.   White blood cell count today is 6.7.    Past Medical History:   Diagnosis Date    Arthritis     Edema     Lower extremities    GERD (gastroesophageal reflux disease)     History of transfusion     Hypertension     Insomnia     Plantar fascial fibromatosis     Tibia fracture     Left       Past Surgical History:   Procedure Laterality Date     CATARACT EXTRACTION Bilateral     COLONOSCOPY      INCISION AND DRAINAGE HIP Left 5/14/2024    Procedure: TOTAL HIP INCISION AND DRAINAGE, POLY EXCHANGE - LEFT;  Surgeon: Sumanth Durbin MD;  [facility];  Service: Orthopedics;  Laterality: Left;    TOTAL HIP ARTHROPLASTY Left 4/23/2024    Procedure: TOTAL HIP ARTHROPLASTY ANTERIOR MODIFIED LEFT;  Surgeon: Sumanth Durbin MD;  Location:  [facility] OR;  Service: Orthopedics;  Laterality: Left;    VEIN SURGERY Left     Vein Stripping       Family History   Problem Relation Age of Onset    Cancer Mother     Cancer Father        Social History     Socioeconomic History    Marital status:    Tobacco Use    Smoking status: Former     Types: Cigarettes    Smokeless tobacco: Former     Types: Chew   Vaping Use    Vaping status: Never Used   Substance and Sexual Activity    Alcohol use: Yes     Comment: occasional    Drug use: No    Sexual activity: Defer       Allergies   Allergen Reactions    Nsaids GI Bleeding     colitis    Penicillins Hives     Fever spiked and out for 7days         Medication:    Current Facility-Administered Medications:     acetaminophen (TYLENOL) tablet 1,000 mg, 1,000 mg, Oral, Q6H, Sumanth Durbin MD, 1,000 mg at 05/15/24 2003    allopurinol (ZYLOPRIM) tablet 300 mg, 300 mg, Oral, Daily, Sumanth Durbin MD, 300 mg at 05/15/24 0852    amitriptyline (ELAVIL) tablet 25 mg, 25 mg, Oral, Nightly PRN, Sumanth Durbin MD    aspirin EC tablet 81 mg, 81 mg, Oral, Q12H, Sumanth Durbin MD, 81 mg at 05/15/24 2004    sennosides-docusate (PERICOLACE) 8.6-50 MG per tablet 2 tablet, 2 tablet, Oral, BID PRN **AND** polyethylene glycol (MIRALAX) packet 17 g, 17 g, Oral, Daily PRN **AND** bisacodyl (DULCOLAX) EC tablet 5 mg, 5 mg, Oral, Daily PRN **AND** bisacodyl (DULCOLAX) suppository 10 mg, 10 mg, Rectal, Daily PRN, Sumanth Durbin MD    cefTRIAXone (ROCEPHIN) 2,000 mg in sodium chloride 0.9 % 100 mL MBP, 2,000 mg, Intravenous, Q24H, Dumont,  Behzad BRENNER MD, Last Rate: 200 mL/hr at 05/15/24 1412, 2,000 mg at 05/15/24 1412    DAPTOmycin (CUBICIN) 750 mg in sodium chloride 0.9 % 50 mL IVPB, 8 mg/kg (Adjusted), Intravenous, Q24H, Behzad Dumont MD, Last Rate: 100 mL/hr at 05/15/24 1554, 750 mg at 05/15/24 1554    gabapentin (NEURONTIN) capsule 300 mg, 300 mg, Oral, Nightly, Sumanth Durbin MD, 300 mg at 05/15/24 2004    HYDROcodone-acetaminophen (NORCO) 5-325 MG per tablet 1 tablet, 1 tablet, Oral, Q4H PRN, Sumanth Durbin MD, 1 tablet at 05/15/24 1412    HYDROmorphone (DILAUDID) injection 0.5 mg, 0.5 mg, Intravenous, Q2H PRN **AND** naloxone (NARCAN) injection 0.1 mg, 0.1 mg, Intravenous, Q5 Min PRN, Sumanth Durbin MD    labetalol (NORMODYNE,TRANDATE) injection 10 mg, 10 mg, Intravenous, Q4H PRN, Sumanth Durbin MD, 10 mg at 05/13/24 2106    mesalamine (LIALDA) EC tablet 1.2 g, 1.2 g, Oral, Q12H, Sumanth Durbin MD, 1.2 g at 05/15/24 2004    metoprolol succinate XL (TOPROL-XL) 24 hr tablet 100 mg, 100 mg, Oral, Daily, Sumanth Durbin MD, 100 mg at 05/15/24 0849    morphine injection 4 mg, 4 mg, Intravenous, Q2H PRN **AND** naloxone (NARCAN) injection 0.4 mg, 0.4 mg, Intravenous, Q5 Min PRN, Sumanth Durbin MD    ondansetron (ZOFRAN) injection 4 mg, 4 mg, Intravenous, Q6H PRN, Sumanth Durbin MD    ondansetron ODT (ZOFRAN-ODT) disintegrating tablet 4 mg, 4 mg, Oral, Q6H PRN **OR** ondansetron (ZOFRAN) injection 4 mg, 4 mg, Intravenous, Q6H PRN, Sumanth Durbin MD    pantoprazole (PROTONIX) EC tablet 40 mg, 40 mg, Oral, Q AM, Sumanth Durbin MD, 40 mg at 05/16/24 0538    sodium chloride 0.9 % bolus 500 mL, 500 mL, Intravenous, TID PRN, Sumanth Durbin MD    sodium chloride 0.9 % flush 1-10 mL, 1-10 mL, Intravenous, PRN, Sumanth Durbin MD    sodium chloride 0.9 % flush 10 mL, 10 mL, Intravenous, Q12H, Sumanth Durbin MD, 10 mL at 05/15/24 2006    sodium chloride 0.9 % flush 10 mL, 10 mL, Intravenous, PRN, Sumanth Durbin MD    sodium chloride  0.9 % flush 10 mL, 10 mL, Intravenous, Q12H, Sumanth Durbin MD, 10 mL at 05/15/24 2006    sodium chloride 0.9 % infusion 40 mL, 40 mL, Intravenous, PRN, Sumanth Durbin MD    sodium chloride 0.9 % infusion 40 mL, 40 mL, Intravenous, PRN, Sumanth Durbin MD    sodium chloride 0.9 % infusion, 120 mL/hr, Intravenous, Continuous, Sumanth Durbin MD, Last Rate: 120 mL/hr at 05/15/24 05, 120 mL/hr at 05/15/24 05    temazepam (RESTORIL) capsule 15 mg, 15 mg, Oral, Nightly PRN, Sumanth Durbin MD, 15 mg at 24    terazosin (HYTRIN) capsule 5 mg, 5 mg, Oral, Nightly, Sumanth Durbin MD, 5 mg at 05/15/24 2004    valsartan (DIOVAN) tablet 160 mg, 160 mg, Oral, Daily, Sumanth Durbin MD, 160 mg at 05/15/24 0849    Antibiotics:  Anti-Infectives (From admission, onward)      Ordered     Dose/Rate Route Frequency Start Stop    05/15/24 1322  DAPTOmycin (CUBICIN) 750 mg in sodium chloride 0.9 % 50 mL IVPB        Ordering Provider: Behzad Dumont MD    8 mg/kg × 91.9 kg (Adjusted)  100 mL/hr over 30 Minutes Intravenous Every 24 Hours 05/15/24 1500 24 1459    05/15/24 1322  cefTRIAXone (ROCEPHIN) 2,000 mg in sodium chloride 0.9 % 100 mL MBP        Ordering Provider: Behzad Dumont MD    2,000 mg  200 mL/hr over 30 Minutes Intravenous Every 24 Hours 05/15/24 1500 24 1459    24 1908  ceFAZolin 2000 mg IVPB in 100 mL NS (MBP)        Ordering Provider: Sumanth Durbin MD    2 g  over 30 Minutes Intravenous Every 8 Hours 05/14/24 2000 05/15/24 0400              Review of Systems:  See HPI      Physical Exam:   Vital Signs  Temp (24hrs), Av.2 °F (36.8 °C), Min:98 °F (36.7 °C), Max:98.3 °F (36.8 °C)    Temp  Min: 98 °F (36.7 °C)  Max: 98.3 °F (36.8 °C)  BP  Min: 160/95  Max: 180/80  Pulse  Min: 71  Max: 77  Resp  Min: 16  Max: 16  SpO2  Min: 91 %  Max: 94 %    GENERAL: Awake and alert, in no acute distress.   HEENT: Normocephalic, atraumatic.  PERRL. EOMI. No conjunctival injection. No  "icterus. Oropharynx clear without evidence of thrush or exudate.  NECK: Supple   HEART: RRR; No murmur, rubs, gallops.   LUNGS: Clear to auscultation bilaterally without wheezing, rales, rhonchi. Normal respiratory effort. Nonlabored. No dullness.  ABDOMEN: Soft, nontender, nondistended. No rebound or guarding. NO mass or HSM  EXT:  left hip has a wound VAC in place with no visible erythema.    :  Without Perez catheter.  MSK: No joint effusions or erythema  SKIN: Warm and dry without cutaneous eruptions on Inspection/palpation.    NEURO: Oriented to PPT.  Motor 5/5 strength  PSYCHIATRIC: Normal insight and judgment. Cooperative with PE    Laboratory Data    Results from last 7 days   Lab Units 05/16/24  0527 05/15/24  0614 05/13/24  1416   WBC 10*3/mm3 6.68 7.96 5.54   HEMOGLOBIN g/dL 10.2* 11.1* 12.0*   HEMATOCRIT % 31.7* 34.0* 36.8*   PLATELETS 10*3/mm3 265 278 313     Results from last 7 days   Lab Units 05/13/24  1416   SODIUM mmol/L 138   POTASSIUM mmol/L 3.7   CHLORIDE mmol/L 102   CO2 mmol/L 24.0   BUN mg/dL 10   CREATININE mg/dL 0.95   GLUCOSE mg/dL 118*   CALCIUM mg/dL 8.4*         Results from last 7 days   Lab Units 05/13/24  1416   SED RATE mm/hr 50*     Results from last 7 days   Lab Units 05/13/24  1416   CRP mg/dL 3.93*     Results from last 7 days   Lab Units 05/13/24  1416   LACTATE mmol/L 1.6             Estimated Creatinine Clearance: 84.6 mL/min (by C-G formula based on SCr of 0.95 mg/dL).      Microbiology:  Blood Culture   Date Value Ref Range Status   05/13/2024 No growth at 24 hours  Preliminary   05/13/2024 No growth at 24 hours  Preliminary     No results found for: \"BCIDPCR\", \"CXREFLEX\", \"CSFCX\", \"CULTURETIS\"  No results found for: \"CULTURES\", \"HSVCX\", \"URCX\"  No results found for: \"EYECULTURE\", \"GCCX\", \"HSVCULTURE\", \"LABHSV\"  No results found for: \"LEGIONELLA\", \"MRSACX\", \"MUMPSCX\", \"MYCOPLASCX\"  No results found for: \"NOCARDIACX\", \"STOOLCX\"  No results found for: \"THROATCX\", " "\"UNSTIMCULT\", \"URINECX\", \"CULTURE\", \"VZVCULTUR\"  No results found for: \"VIRALCULTU\", \"WOUNDCX\"        Radiology:  Imaging Results (Last 72 Hours)       Procedure Component Value Units Date/Time    XR Hip With or Without Pelvis 2 - 3 View Left [[id number]] Collected: 05/14/24 1819     Updated: 05/14/24 1827    Narrative:      DATE OF EXAM: 5/14/2024 6:03 PM     PROCEDURE: XR HIP W OR WO PELVIS 2-3 VIEW LEFT-     INDICATIONS: post-op left NAVNEET, I&D with head and liner exchange;  M96.842-Postprocedural seroma of a musculoskeletal structure following a  musculoskeletal system procedure; Z96.642-Presence of left artificial  hip joint     COMPARISON: Same-day radiographs     TECHNIQUE: Two views of the left hip with Pelvis were obtained.     FINDINGS:  Left hip arthroplasty is in normal alignment. No evidence of new  hardware complication. The bony pelvis is intact with normal alignment.  Moderate right hip arthritis. Expected surgical changes in the left hip  soft tissues. Chronic deformity of the proximal left femur     IMPRESSION   Left hip arthroplasty without evidence of hardware complication.         This report was finalized on 5/14/2024 6:24 PM by Ernesto Wong MD.       FL C Arm During Surgery [[id number]] Resulted: 05/14/24 1700     Updated: 05/14/24 1700    Narrative:      This procedure was auto-finalized with no dictation required.    XR Hip With or Without Pelvis 2 - 3 View Left [[id number]] Collected: 05/14/24 1252     Updated: 05/14/24 1257    Narrative:      DATE OF EXAM: 5/14/2024 12:34 PM     PROCEDURE: XR HIP W OR WO PELVIS 2-3 VIEW LEFT-     INDICATIONS: s/p left NAVNEET     COMPARISON: 4/23/2024.     TECHNIQUE: Two views of the left hip with Pelvis were obtained.     FINDINGS:  3 films. There is a total left hip arthroplasty in normal anatomic  alignment. There is old fracture deformity of the proximal left femur.  There are no hardware complications. There is mild narrowing of the  right hip joint. The right " femoral head has a normal rounded contour and  is normally seated within the acetabulum.       Impression:      Normal-appearing left hip arthroplasty.         This report was finalized on 5/14/2024 12:53 PM by Jessica Garay MD.                 Impression:   1.  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  I will discontinue ceftriaxone and leave him on daptomycin for the time being.  He can discharge today after PICC line placement and his dose of intravenous daptomycin.  2.  Status post left total hip arthroplasty-4/23/2024  3.  Elevated inflammatory markers  4.  Gastroesophageal reflux     I discussed his complex situation in detail with him and his wife again today.  I discussed his disposition with case management today.  He will need to receive his dose of daptomycin late this morning prior to discharge.  His high complexity medical problems required high complexity medical problems today    PLAN/RECOMMENDATIONS:   1.  Left hip debridement with poly exchange-done  2.  Left hip cultures-pending  3.  Daptomycin 750 mg IV daily  4.  Baseline CPK  5.  Discontinue  ceftriaxone  6.  Discharge to home  7.  Appointment to see me in the office tomorrow Friday 5/17 at 11:00-this has been arranged  8.  PICC line placement      Behzad Dumont MD  5/16/2024  07:23 EDT

## 2024-05-16 NOTE — DISCHARGE INSTRUCTIONS
[facility] COLD THERAPY - PATIENT INSTRUCTION SHEET    Cold Compression Therapy for your comfort and rehabilitation  Your caregivers want you to be productive in your rehab and comfortable during your stay. In keeping with those goals, you will be receiving an SMI Cold Therapy Wrap to help ease post-operative pain and swelling that might keep you from getting back on track! Your SMI Cold Therapy Wrap is effective and simple-to-use, and you will be encouraged to apply it throughout your hospital stay and at home through the duration of your recovery.    When you are ready to go home  Be sure to take your SMI Cold Therapy Wrap and both sets of Gel Bags with you for continued comfort and use throughout your rehabilitation. If you don't already have them, ask your nurse or aide to retrieve your SMI Gel Bags from the patient freezer.    Home use precautions  Always follow your medical professional's application instructions upon discharge. Your SMI Cold Therapy Wrap and Gel Bags are designed to last for months following your surgery. Never heat the Gel Bags unless specified by your healthcare provider. Supervision is advised when using this product on children or geriatric patients. To avoid danger of suffocation, please keep the outer plastic packaging away from children & pets.    Cold Therapy Instructions  Place Gel Bags in a freezer set ¾ of the way to max temperature for at least (4) hours. For best results, lay the Gel Bags flat and oums-en-sleq in the freezer. Once frozen, slide Gel Bags into the gel pouch and secure your wrap to the affected area with the straps.  Gel wraps that have been stored in a freezer for an extended period of time may require a (10) minute period of softening up in a room temperature environment before application.  The gel pouch acts as a protective barrier. NEVER place frozen bags directly onto skin, as this may cause frostbite injury.  The SMI Cold Therapy Wrap is designed to be able to be  worm while ambulating. The compression straps can be secured well enough so that the Wrap won't fall off while moving.  Wrap Application Videos can be viewed at [email or web address].  An additional protective barrier such as clothing, a washcloth, hand-towel or pillowcase may be used during prolonged treatment applications.  The Gel-Pouch and Wrap are both Latex-Free and the Gel Bag ingredients are non toxic.    [facility] Wrap care instructions  The [facility] Cold Therapy Wrap may be hand washed and hung to dry when needed.    [facility] re-order information  Additional [facility] body specific wraps and/or Gel Bags can be re-ordered from [email or web address] or call [email or web address] ([phone])

## 2024-05-16 NOTE — DISCHARGE SUMMARY
Patient Name: Papito Salinas  MRN: [id number]  : 1945  DOS: 2024    Attending: Fabi Pina MD    Primary Care Provider: Dick Brewster MD    Date of Admission:.2024  9:58 AM    Date of Discharge:  2024    Discharge Diagnosis:   S/P debridement left hip wound with head and liner exchange    Status post total replacement of left hip 24    Left hip prosthetic joint infection    Gout    Esophageal reflux    HTN (hypertension)    Obesity (BMI 30-39.9)    Seroma of musculoskeletal structure after musculoskeletal system procedure      Hospital Course    At admit:    Patient is a pleasant 78 y.o. male presented direct admission from Dr. Durbin's office.  He had left total hip replacement on 2024.  He was seen today for postop visit.  He reports noting drainage from his incision on Friday and some erythema around his incision.  He was seen in clinic and placed on Keflex.  He continued to have increased drainage throughout the weekend.  He denies fevers, chills or night sweats.     When seen he is doing well.  He denies pain.  He denies nausea, shortness of breath or chest pain.  No history of DVT or PE.     After admit:    Patient was provided pain medications as needed for pain control.  Adjustments were made to pain medications to optimize postop pain management when indicated.   Risks and benefits of opiate medications discussed with patient.  Tejinder report in chart was reviewed prior to discharge.    He underwent Incision and drainage, with irrigation and debridement, left total hip arthroplasty wound, with head and liner exchange under general anesthesia.  He tolerated surgery well.    He was seen by Dr. Dumont, [facility], for antibiotic management.  He received a PICC line prior to discharge and will be discharged on IV antibiotics.  He will follow-up outpatient.    He was seen by PT and has progressed well over his stay.    The patient was placed on DVT  prophylaxis including aspirin. The patient was encouraged to use IS for atelectasis prophylaxis.   The patient was placed on a bowel regimen to prevent constipation while on pain medication.   The patient's H/H was monitored with a slight decrease that remained asymptomatic.    It is felt by all involved that the patient can discharge home at this time, and the patient has no further questions     Consultants:  Dr. Dumont, [facility]    Procedures Performed    DATE OF PROCEDURE:  05/14/24     PREOPERATIVE DIAGNOSIS: Left hip seroma/hematoma, status post left total hip arthroplasty, with possible superficial versus deep infection     POSTOPERATIVE DIAGNOSIS: Left hip seroma/hematoma, status post left total hip arthroplasty, with possible superficial versus deep infection     PROCEDURE PERFORMED: Incision and drainage, with irrigation and debridement, left total hip arthroplasty wound, with head and liner exchange     Surgical Approach: Hip Modified Anterior (Quan-Dela Cruz)     IMPLANTS: Neutral liner for 60 To accommodate a 36 head, with +4 x 36 Oxinium head     SURGEON: Sumanth Durbin MD    Pertinent Test Results:    I reviewed the patient's new clinical results.   Results from last 7 days   Lab Units 05/16/24  0527 05/15/24  0614 05/13/24  1416   WBC 10*3/mm3 6.68 7.96 5.54   HEMOGLOBIN g/dL 10.2* 11.1* 12.0*   HEMATOCRIT % 31.7* 34.0* 36.8*   PLATELETS 10*3/mm3 265 278 313     Results from last 7 days   Lab Units 05/13/24  1416   SODIUM mmol/L 138   POTASSIUM mmol/L 3.7   CHLORIDE mmol/L 102   CO2 mmol/L 24.0   BUN mg/dL 10   CREATININE mg/dL 0.95   CALCIUM mg/dL 8.4*   GLUCOSE mg/dL 118*     Microbiology Results (last 21 days)    Collected Updated Procedure Result Status    05/14/2024 1609 05/14/2024 1822 Fungus Culture - Surgical Site, Hip, Left [[id number]]   Surgical Site from Hip, Left    In process Component Value   No component results             05/14/2024 1609 05/16/2024 0805 Body Fluid Culture - Surgical  "Site, Hip, Left [[id number]]    Surgical Site from Hip, Left    Preliminary result Component Value   Gram Stain Many (4+) WBCs seen P    No organisms seen P             2024 1609 05/15/2024 1128 AFB Culture - Surgical Site, Hip, Left [[id number]]   Surgical Site from Hip, Left    Preliminary result Component Value   AFB Stain No acid fast bacilli seen on concentrated smear P             2024 1609 2024 1822 Anaerobic Culture 10 Day Incubation - Surgical Site, Hip, Left [[id number]]   Surgical Site from Hip, Left    In process Component Value   No component results             2024 1416 2024 1500 Blood Culture - Blood, Hand, Left [[id number]]   Blood from Hand, Left    Preliminary result Component Value   Blood Culture No growth at 3 days P             2024 1416 2024 1500 Blood Culture - Blood, Wrist, Left [[id number]]   Blood from Wrist, Left    Preliminary result Component Value   Blood Culture No growth at 3 days P          I reviewed the patient's new imaging including images and reports.      Physical therapy: Pt. continues to present below baseline function w/generalized weakness, balance deficits and acute pain affecting his ability to safely participate in functional mobility. He performed bed mobility, transfers, ambulated 300' and navigated stairs w/contact guard assist. Activity limited by fatigue. Pt. tolerated ther-ex well. Continue IPPT POC to progress as tolerated.     Discharge Assessment:      Visit Vitals  BP (!) 195/95   Pulse 76   Temp 98.7 °F (37.1 °C)   Resp 16   Ht 185.4 cm (73\")   Wt 110 kg (243 lb)   SpO2 91%   BMI 32.06 kg/m²     Temp (24hrs), Av.3 °F (36.8 °C), Min:98 °F (36.7 °C), Max:98.7 °F (37.1 °C)      General Appearance:    Alert, cooperative, in no acute distress   Lungs:     Clear to auscultation,respirations regular, even and  unlabored    Heart:    Regular rhythm and normal rate, normal S1 and S2   Abdomen:     Normal bowel sounds, no " masses, no organomegaly, soft non-tender, non-distended, no guarding, no rebound  tenderness   Extremities:   CDI dressing left hip. Moves all extremities well, no edema, no cyanosis, no redness   Pulses:   Pulses palpable and equal bilaterally   Skin:   No bleeding, bruising or rash   Neurologic:   Cranial nerves 2 - 12 grossly intact, sensation intact, Flexion and dorsiflexion intact bilateral feet.       Discharge Disposition: home         Discharge Medications        New Medications        Instructions Start Date   DAPTOmycin 750 mg in sodium chloride 0.9 % 50 mL   8 mg/kg (750 mg), Intravenous, Every 24 Hours             Changes to Medications        Instructions Start Date   aspirin 81 MG EC tablet  Commonly known as: ASPIR  What changed: Another medication with the same name was removed. Continue taking this medication, and follow the directions you see here.   81 mg, Oral, 2 Times Daily             Continue These Medications        Instructions Start Date   allopurinol 300 MG tablet  Commonly known as: ZYLOPRIM   300 mg, Oral, Daily      amitriptyline 25 MG tablet  Commonly known as: ELAVIL   25 mg, Oral, Every Night at Bedtime      doxazosin 4 MG tablet  Commonly known as: CARDURA   4 mg, Oral, Every Night at Bedtime      gabapentin 300 MG capsule  Commonly known as: NEURONTIN   300 mg, Oral, Every Night at Bedtime      HYDROcodone-acetaminophen 5-325 MG per tablet  Commonly known as: NORCO   1 tablet, Oral, Every 6 Hours PRN      mesalamine 1.2 g EC tablet  Commonly known as: LIALDA   1 tablet, Oral, Every 12 Hours Scheduled      metoprolol succinate  MG 24 hr tablet  Commonly known as: TOPROL-XL   TAKE 1 TABLET BY MOUTH EVERY DAY      omeprazole 40 MG capsule  Commonly known as: priLOSEC   40 mg, Oral, Daily      temazepam 15 MG capsule  Commonly known as: RESTORIL   15 mg, Oral, Nightly PRN      valsartan 160 MG tablet  Commonly known as: DIOVAN   160 mg, Oral, Daily             Stop These  Medications      cephalexin 500 MG capsule  Commonly known as: KEFLEX     predniSONE 20 MG tablet  Commonly known as: DELTASONE              Discharge Diet:   Diet Instructions    Regular diet           Activity at Discharge: WBAT LLE  Activity Instructions    Activity per Dr. Durbin and/or Physical Therapy's instructions.           Follow-up Appointments  Dr. Durbin per his orders  Dr. Dumont per his orders    DC took over 30 minutes.     Mariah Mckeon, ZEFERINO  05/16/24  15:33 EDT

## 2024-05-17 ENCOUNTER — TRANSITIONAL CARE MANAGEMENT TELEPHONE ENCOUNTER (OUTPATIENT)
Dept: CALL CENTER | Facility: HOSPITAL | Age: 79
End: 2024-05-17
Payer: MEDICARE

## 2024-05-17 LAB
QT INTERVAL: 428 MS
QTC INTERVAL: 497 MS

## 2024-05-17 NOTE — OUTREACH NOTE
Call Center TCM Note      Flowsheet Row Responses   [facility] patient discharged from? [place]   Does the patient have one of the following disease processes/diagnoses(primary or secondary)? General Surgery   TCM attempt successful? 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 Yes   Person spoke with today (if not patient) and relationship wife, Lucia   Medication alerts for this patient IV antibiotics per PICC at [facility] for daily infusions   Meds reviewed with patient/caregiver? Yes   Does the patient have all medications related to this admission filled (includes all antibiotics, pain medications, etc.) Yes   Is the patient taking all medications as directed (includes completed medication regime)? Yes   Comments PCP Dr Brewster. Wife declines to schedule PCP appt with call today. Reports that patient folloiwng with Infectious Disease and Ortho. Discussed watching his B/P as patient has been having some hypertension. Advised to monitor blood pressure and if continued elevated readings to call and schedule appt with PCP   Does the patient have an appointment with their PCP within 7-14 days of discharge? No   Nursing Interventions Patient desires to follow up with specialty only, Routed TCM call to PCP office   Has home health visited the patient within 72 hours of discharge? N/A  [Patient to do outpt PT]   Psychosocial issues? No   Did the patient receive a copy of their discharge instructions? Yes   Nursing interventions Reviewed instructions with patient  [wife]   What is the patient's perception of their health status since discharge? Same   Is the patient/caregiver able to teach back signs and symptoms of incisional infection? Increased redness, swelling or pain at the  incisonal site, Increased drainage or bleeding, Fever   Is the patient/caregiver able to teach back steps to recovery at home? Set small, achievable goals for return to baseline health   If the patient is a current smoker, are they able to teach back resources for cessation? Not a smoker   Is the patient/caregiver able to teach back the hierarchy of who to call/visit for symptoms/problems? PCP, Specialist, Home health nurse, Urgent Care, ED, 911 Yes   TCM call completed? Yes   Call end time 1123   Would this patient benefit from a Referral to [facility] Social Work? No   Is the patient interested in additional calls from an ambulatory ? No            Leanna Laureano RN    5/17/2024, 11:26 EDT

## 2024-05-18 LAB
BACTERIA FLD CULT: ABNORMAL
BACTERIA SPEC AEROBE CULT: NORMAL
BACTERIA SPEC AEROBE CULT: NORMAL
GRAM STN SPEC: ABNORMAL
GRAM STN SPEC: ABNORMAL

## 2024-05-19 LAB
BACTERIA SPEC ANAEROBE CULT: NORMAL
FUNGUS WND CULT: NORMAL
MYCOBACTERIUM SPEC CULT: NORMAL
NIGHT BLUE STAIN TISS: NORMAL

## 2024-05-21 LAB
FUNGUS WND CULT: NORMAL
MYCOBACTERIUM SPEC CULT: NORMAL
NIGHT BLUE STAIN TISS: NORMAL

## 2024-05-22 ENCOUNTER — OFFICE VISIT (OUTPATIENT)
Dept: ORTHOPEDIC SURGERY | Facility: CLINIC | Age: 79
End: 2024-05-22
Payer: MEDICARE

## 2024-05-22 VITALS — TEMPERATURE: 97.3 F

## 2024-05-22 DIAGNOSIS — Z96.642 STATUS POST TOTAL HIP REPLACEMENT, LEFT: Primary | ICD-10-CM

## 2024-05-22 NOTE — PROGRESS NOTES
[facility] [place] [facility] Note        Subjective     CC: Post-op (1 week S/P TOTAL HIP INCISION AND DRAINAGE, POLY EXCHANGE - LEFT (DOS 05/14/2024) /1 months S/P left total hip arthroplasty anterior modified (DOS 4/23/24) ))      JORGE LUIS Salinas is a 78 y.o. male.  Patient returns today 1 week status post I&D of left total hip with head and liner exchange.  He reports minimal hip pain.  He is doing antibiotics IV per Dr. Dubois.  Patient and his wife report beginning Sunday 519, he began having some serosanguineous drainage from his incision.  It has continued.  He has not had any fever.    ROS:    Constiutional:Pt denies fever, chills, nausea, or vomiting.  MSK:as above        Objective      Past Medical History  Past Medical History:   Diagnosis Date    Arthritis     Edema     Lower extremities    GERD (gastroesophageal reflux disease)     History of transfusion     Hypertension     Insomnia     Plantar fascial fibromatosis     Tibia fracture     Left         Physical Exam  Temp 97.3 °F (36.3 °C)     There is no height or weight on file to calculate BMI.    Patient is well nourished and well developed.        Ortho Exam  Left hip exam: Incision is intact with intact sutures.  Serosanguineous drainage from the distal aspect of the incision.  No pain with hip motion.  No erythema around the incision.  Neurovascular intact distally.    Imaging/Labs/EMG Reviewed:  Imaging Results (Last 24 Hours)       ** No results found for the last 24 hours. **              Assessment    Assessment:  1. Status post total hip replacement, left        Plan:  Recommend over the counter anti-inflammatories for pain and/or swelling  Doing well status post I&D left hip with head and liner exchange 5/14/2023 and status post left total hip on 4/23/2024.  Patient is getting IV antibiotics per infectious disease.  He is having some serosanguineous drainage from the incision.  No hip pain.  Recommendation today is  the patient begin dressing the incision with just dry gauze and tape.  We will leave the sutures in place.  While we want him to walk and move around, we do not want him to do this too much as it does increase the drainage.  He will return to see us in 1 week for suture removal and repeat evaluation, sooner if needed.    Patient history, diagnosis and treatment plan discussed with Dr. Durbin.        Idalia Castro PA-C  05/23/24  14:03 EDT

## 2024-05-23 ENCOUNTER — TRANSCRIBE ORDERS (OUTPATIENT)
Dept: LAB | Facility: HOSPITAL | Age: 79
End: 2024-05-23
Payer: MEDICARE

## 2024-05-23 ENCOUNTER — LAB (OUTPATIENT)
Dept: LAB | Facility: HOSPITAL | Age: 79
End: 2024-05-23
Payer: MEDICARE

## 2024-05-23 DIAGNOSIS — T84.52XD INFECTION ASSOCIATED WITH INTERNAL LEFT HIP PROSTHESIS, SUBSEQUENT ENCOUNTER: Primary | ICD-10-CM

## 2024-05-23 DIAGNOSIS — L03.116 CELLULITIS OF LEFT FOOT: ICD-10-CM

## 2024-05-23 DIAGNOSIS — T84.52XD INFECTION ASSOCIATED WITH INTERNAL LEFT HIP PROSTHESIS, SUBSEQUENT ENCOUNTER: ICD-10-CM

## 2024-05-23 LAB
ALBUMIN SERPL-MCNC: 2.9 G/DL (ref 3.5–5.2)
ALBUMIN/GLOB SERPL: 0.8 G/DL
ALP SERPL-CCNC: 170 U/L (ref 39–117)
ALT SERPL W P-5'-P-CCNC: 21 U/L (ref 1–41)
ANION GAP SERPL CALCULATED.3IONS-SCNC: 7 MMOL/L (ref 5–15)
AST SERPL-CCNC: 37 U/L (ref 1–40)
BASOPHILS # BLD AUTO: 0.05 10*3/MM3 (ref 0–0.2)
BASOPHILS NFR BLD AUTO: 0.8 % (ref 0–1.5)
BILIRUB SERPL-MCNC: 0.3 MG/DL (ref 0–1.2)
BUN SERPL-MCNC: 10 MG/DL (ref 8–23)
BUN/CREAT SERPL: 9.1 (ref 7–25)
CALCIUM SPEC-SCNC: 8.3 MG/DL (ref 8.6–10.5)
CHLORIDE SERPL-SCNC: 101 MMOL/L (ref 98–107)
CO2 SERPL-SCNC: 29 MMOL/L (ref 22–29)
CREAT SERPL-MCNC: 1.1 MG/DL (ref 0.76–1.27)
CRP SERPL-MCNC: 5.85 MG/DL (ref 0–0.5)
DEPRECATED RDW RBC AUTO: 43 FL (ref 37–54)
EGFRCR SERPLBLD CKD-EPI 2021: 69.1 ML/MIN/1.73
EOSINOPHIL # BLD AUTO: 0.17 10*3/MM3 (ref 0–0.4)
EOSINOPHIL NFR BLD AUTO: 2.7 % (ref 0.3–6.2)
ERYTHROCYTE [DISTWIDTH] IN BLOOD BY AUTOMATED COUNT: 12.8 % (ref 12.3–15.4)
ERYTHROCYTE [SEDIMENTATION RATE] IN BLOOD: 63 MM/HR (ref 0–20)
GLOBULIN UR ELPH-MCNC: 3.7 GM/DL
GLUCOSE SERPL-MCNC: 101 MG/DL (ref 65–99)
HCT VFR BLD AUTO: 32.9 % (ref 37.5–51)
HGB BLD-MCNC: 10.7 G/DL (ref 13–17.7)
IMM GRANULOCYTES # BLD AUTO: 0.02 10*3/MM3 (ref 0–0.05)
IMM GRANULOCYTES NFR BLD AUTO: 0.3 % (ref 0–0.5)
LYMPHOCYTES # BLD AUTO: 1.06 10*3/MM3 (ref 0.7–3.1)
LYMPHOCYTES NFR BLD AUTO: 16.6 % (ref 19.6–45.3)
MCH RBC QN AUTO: 29.6 PG (ref 26.6–33)
MCHC RBC AUTO-ENTMCNC: 32.5 G/DL (ref 31.5–35.7)
MCV RBC AUTO: 90.9 FL (ref 79–97)
MONOCYTES # BLD AUTO: 0.53 10*3/MM3 (ref 0.1–0.9)
MONOCYTES NFR BLD AUTO: 8.3 % (ref 5–12)
NEUTROPHILS NFR BLD AUTO: 4.55 10*3/MM3 (ref 1.7–7)
NEUTROPHILS NFR BLD AUTO: 71.3 % (ref 42.7–76)
NRBC BLD AUTO-RTO: 0 /100 WBC (ref 0–0.2)
PLATELET # BLD AUTO: 263 10*3/MM3 (ref 140–450)
PMV BLD AUTO: 10.3 FL (ref 6–12)
POTASSIUM SERPL-SCNC: 3.8 MMOL/L (ref 3.5–5.2)
PROT SERPL-MCNC: 6.6 G/DL (ref 6–8.5)
RBC # BLD AUTO: 3.62 10*6/MM3 (ref 4.14–5.8)
SODIUM SERPL-SCNC: 137 MMOL/L (ref 136–145)
WBC NRBC COR # BLD AUTO: 6.38 10*3/MM3 (ref 3.4–10.8)

## 2024-05-23 PROCEDURE — 36415 COLL VENOUS BLD VENIPUNCTURE: CPT

## 2024-05-23 PROCEDURE — 80053 COMPREHEN METABOLIC PANEL: CPT

## 2024-05-23 PROCEDURE — 85025 COMPLETE CBC W/AUTO DIFF WBC: CPT

## 2024-05-23 PROCEDURE — 86140 C-REACTIVE PROTEIN: CPT

## 2024-05-23 PROCEDURE — 85652 RBC SED RATE AUTOMATED: CPT

## 2024-05-24 LAB — BACTERIA SPEC ANAEROBE CULT: NORMAL

## 2024-05-28 LAB
FUNGUS WND CULT: NORMAL
MYCOBACTERIUM SPEC CULT: NORMAL
NIGHT BLUE STAIN TISS: NORMAL

## 2024-05-29 ENCOUNTER — OFFICE VISIT (OUTPATIENT)
Dept: ORTHOPEDIC SURGERY | Facility: CLINIC | Age: 79
End: 2024-05-29
Payer: MEDICARE

## 2024-05-29 DIAGNOSIS — Z96.642 STATUS POST TOTAL HIP REPLACEMENT, LEFT: Primary | ICD-10-CM

## 2024-05-29 PROCEDURE — 99024 POSTOP FOLLOW-UP VISIT: CPT | Performed by: PHYSICIAN ASSISTANT

## 2024-05-29 PROCEDURE — 1159F MED LIST DOCD IN RCRD: CPT | Performed by: PHYSICIAN ASSISTANT

## 2024-05-29 PROCEDURE — 1160F RVW MEDS BY RX/DR IN RCRD: CPT | Performed by: PHYSICIAN ASSISTANT

## 2024-05-29 NOTE — PROGRESS NOTES
[facility] [place] [facility] [place]      Subjective     CC: Post-op (1 week follow-up: 2 weeks S/P Incision and Drainage, Poly Exchange (5/14/24); 5 weeks S/P Left Total Hip Arthroplasty Anterior Modified (4/23/24))      JORGE LUIS Salinas is a 78 y.o. male.  Patient presents today 2 weeks status post left hip I&D with liner and head exchange.  He reports that the incision quit draining approximately 2 days ago.  He is not having any hip pain itself.  Has been happy with his progress.  Continues with infectious disease.    Overall, patient's symptoms are improved    ROS:    Constiutional:Pt denies fever, chills, nausea, or vomiting.  MSK:as above        Objective      Past Medical History  Past Medical History:   Diagnosis Date    Arthritis     Edema     Lower extremities    GERD (gastroesophageal reflux disease)     History of transfusion     Hypertension     Insomnia     Plantar fascial fibromatosis     Tibia fracture     Left         Physical Exam  There were no vitals taken for this visit.    There is no height or weight on file to calculate BMI.    Patient is well nourished and well developed.        Ortho Exam  Left hip exam: Incision is healing well with intact sutures.  No drainage.  No sign of infection.  Several small areas of open skin secondary to adhesive reaction.  Patient walking with a cane.  Intact flexors adductor's abductor's.    Imaging/Labs/EMG Reviewed:  Imaging Results (Last 24 Hours)       Procedure Component Value Units Date/Time    XR Hip With or Without Pelvis 2 - 3 View Left [[id number]] Resulted: 05/29/24 0950     Updated: 05/29/24 0951    Narrative:      Left Hip Radiographs  Indication: status-post left total hip arthroplasty  Views: low AP pelvis and lateral of the left hip    Comparison: no change compared to prior study, 5/14/2024    Findings:   The components are well aligned, with no signs of loosening or failure.                 Assessment    Assessment:  1.  Status post total hip replacement, left        Plan:  Recommend over the counter anti-inflammatories for pain and/or swelling  Doing well status post left total hip arthroplasty with Dr. Durbin on 4/23/2024 followed by I&D with liner and head exchange on 5/14/2024.  The incision has stopped draining.  Incision is healing well with intact sutures.  Plan today is to have him return in 1 week for suture removal.  I have put in Optifoam dressing over the incision which will stay in place until he returns next week.  We will reinitiate physical therapy upon his return visit.  Patient history, diagnosis and treatment plan discussed with Dr. Durbin.        Idalia Castro PA-C  05/29/24  13:16 EDT      Dragon disclaimer:  Much of this encounter note is an electronic transcription/translation of spoken language to printed text. The electronic translation of spoken language may permit erroneous, or at times, nonsensical words or phrases to be inadvertently transcribed; Although I have reviewed the note for such errors, some may still exist.

## 2024-06-02 ENCOUNTER — APPOINTMENT (OUTPATIENT)
Dept: GENERAL RADIOLOGY | Facility: HOSPITAL | Age: 79
End: 2024-06-02
Payer: MEDICARE

## 2024-06-02 ENCOUNTER — APPOINTMENT (OUTPATIENT)
Dept: CT IMAGING | Facility: HOSPITAL | Age: 79
End: 2024-06-02
Payer: MEDICARE

## 2024-06-02 ENCOUNTER — HOSPITAL ENCOUNTER (EMERGENCY)
Facility: HOSPITAL | Age: 79
Discharge: HOME | End: 2024-06-02
Attending: EMERGENCY MEDICINE | Admitting: EMERGENCY MEDICINE
Payer: MEDICARE

## 2024-06-02 VITALS
SYSTOLIC BLOOD PRESSURE: 197 MMHG | TEMPERATURE: 98.3 F | HEART RATE: 73 BPM | OXYGEN SATURATION: 95 % | DIASTOLIC BLOOD PRESSURE: 97 MMHG | RESPIRATION RATE: 18 BRPM

## 2024-06-02 DIAGNOSIS — T84.59XD PROSTHETIC HIP INFECTION, SUBSEQUENT ENCOUNTER: ICD-10-CM

## 2024-06-02 DIAGNOSIS — R42 DIZZINESS: ICD-10-CM
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
## 2024-06-02 DIAGNOSIS — H93.12 TINNITUS AURIUM, LEFT: ICD-10-CM

## 2024-06-02 DIAGNOSIS — S09.90XA TRAUMATIC INJURY OF HEAD, INITIAL ENCOUNTER: ICD-10-CM

## 2024-06-02 DIAGNOSIS — Z96.649 PROSTHETIC HIP INFECTION, SUBSEQUENT ENCOUNTER: ICD-10-CM

## 2024-06-02 LAB
ALBUMIN SERPL-MCNC: 3 G/DL (ref 3.5–5.2)
ALBUMIN/GLOB SERPL: 0.7 G/DL
ALP SERPL-CCNC: 208 U/L (ref 39–117)
ALT SERPL W P-5'-P-CCNC: 22 U/L (ref 1–41)
ANION GAP SERPL CALCULATED.3IONS-SCNC: 9 MMOL/L (ref 5–15)
AST SERPL-CCNC: 40 U/L (ref 1–40)
BASOPHILS # BLD AUTO: 0.04 10*3/MM3 (ref 0–0.2)
BASOPHILS NFR BLD AUTO: 0.6 % (ref 0–1.5)
BILIRUB SERPL-MCNC: 0.4 MG/DL (ref 0–1.2)
BUN SERPL-MCNC: 8 MG/DL (ref 8–23)
BUN/CREAT SERPL: 7.6 (ref 7–25)
CALCIUM SPEC-SCNC: 8.2 MG/DL (ref 8.6–10.5)
CHLORIDE SERPL-SCNC: 102 MMOL/L (ref 98–107)
CO2 SERPL-SCNC: 28 MMOL/L (ref 22–29)
CREAT SERPL-MCNC: 1.05 MG/DL (ref 0.76–1.27)
DEPRECATED RDW RBC AUTO: 42.2 FL (ref 37–54)
EGFRCR SERPLBLD CKD-EPI 2021: 73.1 ML/MIN/1.73
EOSINOPHIL # BLD AUTO: 0.24 10*3/MM3 (ref 0–0.4)
EOSINOPHIL NFR BLD AUTO: 3.7 % (ref 0.3–6.2)
ERYTHROCYTE [DISTWIDTH] IN BLOOD BY AUTOMATED COUNT: 12.7 % (ref 12.3–15.4)
GLOBULIN UR ELPH-MCNC: 4.4 GM/DL
GLUCOSE SERPL-MCNC: 114 MG/DL (ref 65–99)
HCT VFR BLD AUTO: 35.3 % (ref 37.5–51)
HGB BLD-MCNC: 11.3 G/DL (ref 13–17.7)
HOLD SPECIMEN: NORMAL
IMM GRANULOCYTES # BLD AUTO: 0.02 10*3/MM3 (ref 0–0.05)
IMM GRANULOCYTES NFR BLD AUTO: 0.3 % (ref 0–0.5)
LYMPHOCYTES # BLD AUTO: 1.05 10*3/MM3 (ref 0.7–3.1)
LYMPHOCYTES NFR BLD AUTO: 16.2 % (ref 19.6–45.3)
MAGNESIUM SERPL-MCNC: 1.6 MG/DL (ref 1.6–2.4)
MCH RBC QN AUTO: 28.8 PG (ref 26.6–33)
MCHC RBC AUTO-ENTMCNC: 32 G/DL (ref 31.5–35.7)
MCV RBC AUTO: 90.1 FL (ref 79–97)
MONOCYTES # BLD AUTO: 0.42 10*3/MM3 (ref 0.1–0.9)
MONOCYTES NFR BLD AUTO: 6.5 % (ref 5–12)
NEUTROPHILS NFR BLD AUTO: 4.72 10*3/MM3 (ref 1.7–7)
NEUTROPHILS NFR BLD AUTO: 72.7 % (ref 42.7–76)
NRBC BLD AUTO-RTO: 0 /100 WBC (ref 0–0.2)
PLATELET # BLD AUTO: 322 10*3/MM3 (ref 140–450)
PMV BLD AUTO: 10.3 FL (ref 6–12)
POTASSIUM SERPL-SCNC: 3.8 MMOL/L (ref 3.5–5.2)
PROT SERPL-MCNC: 7.4 G/DL (ref 6–8.5)
QT INTERVAL: 430 MS
QTC INTERVAL: 499 MS
RBC # BLD AUTO: 3.92 10*6/MM3 (ref 4.14–5.8)
SODIUM SERPL-SCNC: 139 MMOL/L (ref 136–145)
TSH SERPL DL<=0.05 MIU/L-ACNC: 1.89 UIU/ML (ref 0.27–4.2)
WBC NRBC COR # BLD AUTO: 6.49 10*3/MM3 (ref 3.4–10.8)
WHOLE BLOOD HOLD COAG: NORMAL
WHOLE BLOOD HOLD SPECIMEN: NORMAL

## 2024-06-02 PROCEDURE — 99285 EMERGENCY DEPT VISIT HI MDM: CPT

## 2024-06-02 PROCEDURE — 96365 THER/PROPH/DIAG IV INF INIT: CPT

## 2024-06-02 PROCEDURE — 84443 ASSAY THYROID STIM HORMONE: CPT | Performed by: EMERGENCY MEDICINE

## 2024-06-02 PROCEDURE — 72170 X-RAY EXAM OF PELVIS: CPT

## 2024-06-02 PROCEDURE — 93005 ELECTROCARDIOGRAM TRACING: CPT

## 2024-06-02 PROCEDURE — 70450 CT HEAD/BRAIN W/O DYE: CPT

## 2024-06-02 PROCEDURE — 83735 ASSAY OF MAGNESIUM: CPT | Performed by: EMERGENCY MEDICINE

## 2024-06-02 PROCEDURE — 80053 COMPREHEN METABOLIC PANEL: CPT | Performed by: EMERGENCY MEDICINE

## 2024-06-02 PROCEDURE — 85025 COMPLETE CBC W/AUTO DIFF WBC: CPT | Performed by: EMERGENCY MEDICINE

## 2024-06-02 PROCEDURE — 25010000002 ERTAPENEM PER 500 MG: Performed by: EMERGENCY MEDICINE

## 2024-06-02 RX ORDER — MECLIZINE HYDROCHLORIDE 25 MG/1
25 TABLET ORAL 3 TIMES DAILY PRN
Qty: 15 TABLET | Refills: 0 | Status: SHIPPED | OUTPATIENT
Start: 2024-06-02

## 2024-06-02 RX ADMIN — ERTAPENEM 1000 MG: 1 INJECTION INTRAMUSCULAR; INTRAVENOUS at 11:32

## 2024-06-02 NOTE — ED PROVIDER NOTES
Subjective   History of Present Illness  This is a pleasant 78-year-old male  accompanied by his wife.  Complex recent past medical history.  He has been seeing Dr. Durbin had total hip arthroplasty in April at which time he gave up smoking and drinking.  Subsequently got infection and to be readmitted had revision of the hardware has been on IV antibiotics since discharge Dr. Villagomez's office.  He is not sure which antibiotic that he is on.  He has been having no difficulties with his infusion and presented today for his daily infusion but when he was trying to sit on a stool lost his footing and fell backwards and hit his head and landed on his left shoulder and buttocks.  The he just has a mild headache now and his hips feel okay and his left shoulder is not tender.  A rapid response was called and the patient was transported to the emergency department for further evaluation.    He has been getting around fairly well in his walker and about 4 days ago his drainage from the left hip stopped and so is happy about that.  The episode today did not involve syncope or palpitations.  Of note the patient is felt out of balance for several days and has had some intermittent tinnitus in his left ear which is little bit unusual for him.  He has no focal weakness but he has felt just generally weak in the legs and has not been able to do physical therapy since his hip replacement.  He has no cardiac history.  He had no fevers or chills or cough.  Bowel movements and urine have been his baseline.    All other systems history is reviewed and are negative except as noted above        Review of Systems   All other systems reviewed and are negative.      Past Medical History:   Diagnosis Date    Arthritis     Edema     Lower extremities    GERD (gastroesophageal reflux disease)     History of transfusion     Hypertension     Insomnia     Plantar fascial fibromatosis     Tibia fracture     Left       Allergies   Allergen  Reactions    Nsaids GI Bleeding     colitis    Penicillins Hives     Fever spiked and out for 7days       Past Surgical History:   Procedure Laterality Date    CATARACT EXTRACTION Bilateral     COLONOSCOPY      INCISION AND DRAINAGE HIP Left 5/14/2024    Procedure: TOTAL HIP INCISION AND DRAINAGE, POLY EXCHANGE - LEFT;  Surgeon: Sumanth Durbin MD;  [facility];  Service: Orthopedics;  Laterality: Left;    TOTAL HIP ARTHROPLASTY Left 4/23/2024    Procedure: TOTAL HIP ARTHROPLASTY ANTERIOR MODIFIED LEFT;  Surgeon: Sumanth Durbin MD;  [facility];  Service: Orthopedics;  Laterality: Left;    VEIN SURGERY Left     Vein Stripping       Family History   Problem Relation Age of Onset    Cancer Mother     Cancer Father        Social History     Socioeconomic History    Marital status:    Tobacco Use    Smoking status: Former     Types: Cigarettes     Passive exposure: Past    Smokeless tobacco: Former     Types: Chew   Vaping Use    Vaping status: Never Used   Substance and Sexual Activity    Alcohol use: Yes     Comment: occasional    Drug use: No    Sexual activity: Defer           Objective   Physical Exam  Vitals and nursing note reviewed.   Constitutional:       Comments: Pleasant 78-year-old alert and oriented [employer] 15 his voice and face and everything else looks at his baseline per his wife though she does notice his voice has become softer over the past several months.  He is in no distress.   HENT:      Head:      Comments: Minimally tender left scalp area without significant hematoma or damage that I can see in the skin.     Ears:      Comments: Externally his ears look okay is normal wax in each canal but I can see his TMs and are clear bilaterally.     Nose: Nose normal.      Mouth/Throat:      Mouth: Mucous membranes are moist.      Pharynx: Oropharynx is clear.   Eyes:      Extraocular Movements: Extraocular movements intact.      Conjunctiva/sclera: Conjunctivae normal.      Pupils:  Pupils are equal, round, and reactive to light.   Neck:      Vascular: No carotid bruit.   Cardiovascular:      Rate and Rhythm: Normal rate and regular rhythm.      Pulses: Normal pulses.      Heart sounds: Normal heart sounds.   Pulmonary:      Effort: Pulmonary effort is normal.      Breath sounds: Normal breath sounds.      Comments: Chest is nontender to palpation shoulders thoracic spine nontender.  Abdominal:      Comments: BMI 32 positive bowel sounds soft nontender no organomegaly, masses, or guarding.   Musculoskeletal:      Cervical back: Normal range of motion and neck supple. No rigidity or tenderness.      Comments: His shoulders and upper extremities are nontender to palpation.  Minimal tenderness left hip where he had his recent hip arthroplasty and repair.  His pelvis is stable.  His knees are nontender ankles and feet are nontender but he has 2+ to 3+ pitting bilaterally which is chronic for him venous cords he has good pulses in his feet.   Lymphadenopathy:      Cervical: No cervical adenopathy.   Skin:     Capillary Refill: Capillary refill takes less than 2 seconds.   Neurological:      Comments: Face symmetric, voice strong, tongue midline.  Vision, hearing, and speech preserved.  No focal weakness  strength good bilaterally no pronator drift can lift both extremities against gravity in the lower extremities but it does hurt a little bit in his left hip which he has had ongoing since surgery.  He has 1 or 2 beats of right lateral gaze nystagmus.         Procedures           ED Course                                           Recent Results (from the past 24 hour(s))   Green Top (Gel)    Collection Time: 06/02/24  9:30 AM   Result Value Ref Range    Extra Tube Hold for add-ons.    Lavender Top    Collection Time: 06/02/24  9:30 AM   Result Value Ref Range    Extra Tube hold for add-on    Gold Top - SST    Collection Time: 06/02/24  9:30 AM   Result Value Ref Range    Extra Tube Hold for  add-ons.    Gray Top    Collection Time: 06/02/24  9:30 AM   Result Value Ref Range    Extra Tube Hold for add-ons.    Light Blue Top    Collection Time: 06/02/24  9:30 AM   Result Value Ref Range    Extra Tube Hold for add-ons.    Comprehensive Metabolic Panel    Collection Time: 06/02/24  9:30 AM    Specimen: Blood   Result Value Ref Range    Glucose 114 (H) 65 - 99 mg/dL    BUN 8 8 - 23 mg/dL    Creatinine 1.05 0.76 - 1.27 mg/dL    Sodium 139 136 - 145 mmol/L    Potassium 3.8 3.5 - 5.2 mmol/L    Chloride 102 98 - 107 mmol/L    CO2 28.0 22.0 - 29.0 mmol/L    Calcium 8.2 (L) 8.6 - 10.5 mg/dL    Total Protein 7.4 6.0 - 8.5 g/dL    Albumin 3.0 (L) 3.5 - 5.2 g/dL    ALT (SGPT) 22 1 - 41 U/L    AST (SGOT) 40 1 - 40 U/L    Alkaline Phosphatase 208 (H) 39 - 117 U/L    Total Bilirubin 0.4 0.0 - 1.2 mg/dL    Globulin 4.4 gm/dL    A/G Ratio 0.7 g/dL    BUN/Creatinine Ratio 7.6 7.0 - 25.0    Anion Gap 9.0 5.0 - 15.0 mmol/L    eGFR 73.1 >60.0 mL/min/1.73   TSH    Collection Time: 06/02/24  9:30 AM    Specimen: Blood   Result Value Ref Range    TSH 1.890 0.270 - 4.200 uIU/mL   Magnesium    Collection Time: 06/02/24  9:30 AM    Specimen: Blood   Result Value Ref Range    Magnesium 1.6 1.6 - 2.4 mg/dL   CBC Auto Differential    Collection Time: 06/02/24  9:30 AM    Specimen: Blood   Result Value Ref Range    WBC 6.49 3.40 - 10.80 10*3/mm3    RBC 3.92 (L) 4.14 - 5.80 10*6/mm3    Hemoglobin 11.3 (L) 13.0 - 17.7 g/dL    Hematocrit 35.3 (L) 37.5 - 51.0 %    MCV 90.1 79.0 - 97.0 fL    MCH 28.8 26.6 - 33.0 pg    MCHC 32.0 31.5 - 35.7 g/dL    RDW 12.7 12.3 - 15.4 %    RDW-SD 42.2 37.0 - 54.0 fl    MPV 10.3 6.0 - 12.0 fL    Platelets 322 140 - 450 10*3/mm3    Neutrophil % 72.7 42.7 - 76.0 %    Lymphocyte % 16.2 (L) 19.6 - 45.3 %    Monocyte % 6.5 5.0 - 12.0 %    Eosinophil % 3.7 0.3 - 6.2 %    Basophil % 0.6 0.0 - 1.5 %    Immature Grans % 0.3 0.0 - 0.5 %    Neutrophils, Absolute 4.72 1.70 - 7.00 10*3/mm3    Lymphocytes, Absolute 1.05  0.70 - 3.10 10*3/mm3    Monocytes, Absolute 0.42 0.10 - 0.90 10*3/mm3    Eosinophils, Absolute 0.24 0.00 - 0.40 10*3/mm3    Basophils, Absolute 0.04 0.00 - 0.20 10*3/mm3    Immature Grans, Absolute 0.02 0.00 - 0.05 10*3/mm3    nRBC 0.0 0.0 - 0.2 /100 WBC   ECG 12 Lead Syncope    Collection Time: 06/02/24  9:32 AM   Result Value Ref Range    QT Interval 430 ms    QTC Interval 499 ms     Note: In addition to lab results from this visit, the labs listed above may include labs taken at another facility or during a different encounter within the last 24 hours. Please correlate lab times with ED admission and discharge times for further clarification of the services performed during this visit.    XR Pelvis 1 or 2 View   Final Result   No acute fracture or dislocation is identified.         Electronically Signed: Howie Posadas MD     6/2/2024 11:08 AM EDT     Workstation ID: [id number]      CT Head Without Contrast   Final Result   1.No evidence for acute intracranial abnormality.   2.Nonspecific white matter changes are noted with associated diffuse volume loss. These findings are likely related to chronic small vessel ischemic changes and/or age-related changes.               Electronically Signed: Max Rhodes MD     6/2/2024 10:21 AM EDT     Workstation ID: [id number]        Vitals:    06/02/24 0932 06/02/24 1030 06/02/24 1045 06/02/24 1100   BP: 174/84 (!) 162/123 (!) 184/89 (!) 197/97   Pulse:  74 73 73   Resp:       Temp:       TempSrc:       SpO2:  96% 93% 95%     Medications   ertapenem (INVanz) 1,000 mg in sodium chloride 0.9 % 100 mL MBP (0 mg Intravenous Stopped 6/2/24 1238)     ECG/EMG Results (last 24 hours)       Procedure Component Value Units Date/Time    ECG 12 Lead Syncope [[id number]] Collected: 06/02/24 0932     Updated: 06/02/24 0931     QT Interval 430 ms      QTC Interval 499 ms     Narrative:      Test Reason : Syncope  Blood Pressure :   */*   mmHG  Vent. Rate :  81 BPM     Atrial Rate :  81 BPM      P-R Int : 188 ms          QRS Dur : 114 ms      QT Int : 430 ms       P-R-T Axes :  27 -38 -17 degrees     QTc Int : 499 ms    Normal sinus rhythm  Left axis deviation  Incomplete right bundle branch block  Minimal voltage criteria for LVH, may be normal variant  T wave abnormality, consider anterior ischemia  Prolonged QT  Abnormal ECG  When compared with ECG of 13-MAY-2024 13:37,  No significant change was found    Referred By: ED           Confirmed By:           ECG 12 Lead Syncope   Final Result   Test Reason : Syncope   Blood Pressure :   */*   mmHG   Vent. Rate :  81 BPM     Atrial Rate :  81 BPM      P-R Int : 188 ms          QRS Dur : 114 ms       QT Int : 430 ms       P-R-T Axes :  27 -38 -17 degrees      QTc Int : 499 ms      Normal sinus rhythm   Left axis deviation   Incomplete right bundle branch block   Minimal voltage criteria for LVH, may be normal variant   T wave abnormality, consider anterior ischemia   Prolonged QT   Abnormal ECG   When compared with ECG of 13-MAY-2024 13:37,   No significant change was found   qtc prolonged on preoop ekg as well   Confirmed by JUNE SIMPSON MD ([id number]) on 6/2/2024 10:18:18 AM      Referred By: ED           Confirmed By: JUNE SIMPSON MD          Medical Decision Making      I reviewed all available studies at bedside with the patient and his wife.  Personally reviewed his head CT which is unremarkable just some chronic microangiopathic changes.  Pelvic x-rays reassuring.  Labs are relatively bland alk phosphatase elevated I think due to recent surgery.  Mildly anemia but improving.    Long discussion with the patient and his wife regarding the dizziness.  CT is reassuring but certainly a small infarct is possible though likely with the tinnitus he may have a peripheral issue.  We talked about MRI but patient really does not want it right now and I think this is reasonable.    I would refer him to ENT for follow-up of the dizziness we will try him on a little  meclizine to see if this improves things.  Talked him at length about using his walker and try to minimize his risk for falling again.    I spoke Dr. Kothari, on-call infectious disease and patient is on ertapenem and he would like the patient to have another dose of ertapenem here.  Note the patient's QTc on his preop EKG was a bit prolonged and his QTc remains a bit prolonged today.  However in discussions with Dr. Kothari we agree that this is likely not due to any of his antibiotics.  This will need to be followed however by his primary care.  Does not sound like his event today was a syncopal event but more slip and fall.    He will follow-up with infectious disease tomorrow and return to the emergency department if worse in any way.    All are agreeable with that plan        Problems Addressed:  Dizziness: undiagnosed new problem with uncertain prognosis  Fall, initial encounter: complicated acute illness or injury with systemic symptoms that poses a threat to life or bodily functions  Prosthetic hip infection, subsequent encounter: chronic illness or injury with exacerbation, progression, or side effects of treatment that poses a threat to life or bodily functions  Tinnitus aurium, left: undiagnosed new problem with uncertain prognosis  Traumatic injury of head, initial encounter: complicated acute illness or injury with systemic symptoms that poses a threat to life or bodily functions    Amount and/or Complexity of Data Reviewed  Independent Historian: spouse  External Data Reviewed: labs, ECG and notes.  Labs: ordered. Decision-making details documented in ED Course.  Radiology: ordered and independent interpretation performed. Decision-making details documented in ED Course.  ECG/medicine tests: ordered and independent interpretation performed. Decision-making details documented in ED Course.    Risk  Prescription drug management.  Drug therapy requiring intensive monitoring for toxicity.        Final  diagnoses:   Fall, initial encounter   Dizziness   Tinnitus aurium, left   Traumatic injury of head, initial encounter   Prosthetic hip infection, subsequent encounter       ED Disposition  ED Disposition       ED Disposition   Discharge    Condition   Stable    Comment   --               Dick Brewster MD  [address] [facility]    [place] [address]  [phone]      As needed    Behzad Dumont MD  [address] [facility] [address]  [place] [address]  [phone]      Keep follow-up for tomorrow    Darius Davison MD  [address] [facility] [address]  [place] [address]  [phone]      Follow-up of dizziness and buzzing in the left ear         Medication List        New Prescriptions      meclizine 25 MG tablet  Commonly known as: ANTIVERT  Take 1 tablet by mouth 3 (Three) Times a Day As Needed for Dizziness.               Where to Get Your Medications        These medications were sent to [facility]/pharmacy #[id number] - [place], [place] - [address]  - [phone] [facility] [phone] FX  [address] [place] [id number]      Phone: [phone]   meclizine 25 MG tablet            Jim Hernandez MD  06/02/24 1969

## 2024-06-02 NOTE — DISCHARGE INSTRUCTIONS
Use your walker whenever you get up and be careful.  Try to do your transition slowly so you fall again.

## 2024-06-04 LAB
FUNGUS WND CULT: NORMAL
MYCOBACTERIUM SPEC CULT: NORMAL
NIGHT BLUE STAIN TISS: NORMAL

## 2024-06-05 ENCOUNTER — LAB (OUTPATIENT)
Dept: LAB | Facility: HOSPITAL | Age: 79
End: 2024-06-05
Payer: MEDICARE

## 2024-06-05 ENCOUNTER — OFFICE VISIT (OUTPATIENT)
Dept: ORTHOPEDIC SURGERY | Facility: CLINIC | Age: 79
End: 2024-06-05
Payer: MEDICARE

## 2024-06-05 ENCOUNTER — TRANSCRIBE ORDERS (OUTPATIENT)
Dept: LAB | Facility: HOSPITAL | Age: 79
End: 2024-06-05
Payer: MEDICARE

## 2024-06-05 DIAGNOSIS — Z96.642 STATUS POST TOTAL HIP REPLACEMENT, LEFT: Primary | ICD-10-CM

## 2024-06-05 DIAGNOSIS — L03.116 CELLULITIS OF LEFT FOOT: ICD-10-CM

## 2024-06-05 DIAGNOSIS — T84.52XD INFECTION ASSOCIATED WITH INTERNAL LEFT HIP PROSTHESIS, SUBSEQUENT ENCOUNTER: ICD-10-CM

## 2024-06-05 DIAGNOSIS — B96.89 BACTERIAL CHOLANGITIS: Primary | ICD-10-CM

## 2024-06-05 DIAGNOSIS — K83.09 BACTERIAL CHOLANGITIS: Primary | ICD-10-CM

## 2024-06-05 LAB
ALBUMIN SERPL-MCNC: 3 G/DL (ref 3.5–5.2)
ALBUMIN/GLOB SERPL: 0.7 G/DL
ALP SERPL-CCNC: 202 U/L (ref 39–117)
ALT SERPL W P-5'-P-CCNC: 20 U/L (ref 1–41)
ANION GAP SERPL CALCULATED.3IONS-SCNC: 9 MMOL/L (ref 5–15)
AST SERPL-CCNC: 33 U/L (ref 1–40)
BASOPHILS # BLD AUTO: 0.02 10*3/MM3 (ref 0–0.2)
BASOPHILS NFR BLD AUTO: 0.3 % (ref 0–1.5)
BILIRUB SERPL-MCNC: 0.3 MG/DL (ref 0–1.2)
BUN SERPL-MCNC: 9 MG/DL (ref 8–23)
BUN/CREAT SERPL: 10.2 (ref 7–25)
CALCIUM SPEC-SCNC: 8.2 MG/DL (ref 8.6–10.5)
CHLORIDE SERPL-SCNC: 102 MMOL/L (ref 98–107)
CO2 SERPL-SCNC: 28 MMOL/L (ref 22–29)
CREAT SERPL-MCNC: 0.88 MG/DL (ref 0.76–1.27)
CRP SERPL-MCNC: 4.58 MG/DL (ref 0–0.5)
DEPRECATED RDW RBC AUTO: 43.3 FL (ref 37–54)
EGFRCR SERPLBLD CKD-EPI 2021: 88.6 ML/MIN/1.73
EOSINOPHIL # BLD AUTO: 0.29 10*3/MM3 (ref 0–0.4)
EOSINOPHIL NFR BLD AUTO: 4.8 % (ref 0.3–6.2)
ERYTHROCYTE [DISTWIDTH] IN BLOOD BY AUTOMATED COUNT: 13 % (ref 12.3–15.4)
ERYTHROCYTE [SEDIMENTATION RATE] IN BLOOD: 75 MM/HR (ref 0–20)
GLOBULIN UR ELPH-MCNC: 4.5 GM/DL
GLUCOSE SERPL-MCNC: 115 MG/DL (ref 65–99)
HCT VFR BLD AUTO: 35.9 % (ref 37.5–51)
HGB BLD-MCNC: 11.2 G/DL (ref 13–17.7)
IMM GRANULOCYTES # BLD AUTO: 0.02 10*3/MM3 (ref 0–0.05)
IMM GRANULOCYTES NFR BLD AUTO: 0.3 % (ref 0–0.5)
LYMPHOCYTES # BLD AUTO: 0.96 10*3/MM3 (ref 0.7–3.1)
LYMPHOCYTES NFR BLD AUTO: 15.8 % (ref 19.6–45.3)
MCH RBC QN AUTO: 28.1 PG (ref 26.6–33)
MCHC RBC AUTO-ENTMCNC: 31.2 G/DL (ref 31.5–35.7)
MCV RBC AUTO: 90 FL (ref 79–97)
MONOCYTES # BLD AUTO: 0.41 10*3/MM3 (ref 0.1–0.9)
MONOCYTES NFR BLD AUTO: 6.7 % (ref 5–12)
NEUTROPHILS NFR BLD AUTO: 4.38 10*3/MM3 (ref 1.7–7)
NEUTROPHILS NFR BLD AUTO: 72.1 % (ref 42.7–76)
NRBC BLD AUTO-RTO: 0 /100 WBC (ref 0–0.2)
PLATELET # BLD AUTO: 281 10*3/MM3 (ref 140–450)
PMV BLD AUTO: 9.9 FL (ref 6–12)
POTASSIUM SERPL-SCNC: 4 MMOL/L (ref 3.5–5.2)
PROT SERPL-MCNC: 7.5 G/DL (ref 6–8.5)
RBC # BLD AUTO: 3.99 10*6/MM3 (ref 4.14–5.8)
SODIUM SERPL-SCNC: 139 MMOL/L (ref 136–145)
WBC NRBC COR # BLD AUTO: 6.08 10*3/MM3 (ref 3.4–10.8)

## 2024-06-05 PROCEDURE — 85652 RBC SED RATE AUTOMATED: CPT

## 2024-06-05 PROCEDURE — 36415 COLL VENOUS BLD VENIPUNCTURE: CPT

## 2024-06-05 PROCEDURE — 99024 POSTOP FOLLOW-UP VISIT: CPT | Performed by: PHYSICIAN ASSISTANT

## 2024-06-05 PROCEDURE — 80053 COMPREHEN METABOLIC PANEL: CPT | Performed by: INTERNAL MEDICINE

## 2024-06-05 PROCEDURE — 1159F MED LIST DOCD IN RCRD: CPT | Performed by: PHYSICIAN ASSISTANT

## 2024-06-05 PROCEDURE — 1160F RVW MEDS BY RX/DR IN RCRD: CPT | Performed by: PHYSICIAN ASSISTANT

## 2024-06-05 PROCEDURE — 86140 C-REACTIVE PROTEIN: CPT

## 2024-06-05 PROCEDURE — 85025 COMPLETE CBC W/AUTO DIFF WBC: CPT

## 2024-06-05 NOTE — PROGRESS NOTES
[facility] Note      Subjective     CC: Post-op (1 week follow-up: 3 weeks S/P Left Total Hip Incision & Drainage, Poly Exchange (05/14/2024); 6 weeks S/P Modified Anterior Left NAVNEET (4/23/24))      JORGE LUIS Salinas is a 78 y.o. male. Patient presents today 3 weeks s/p L NAVNEET I&D with poly and head exchange.  He reports no pain in the hip.  He was seen at the ED on 6/2/24 after he fell off a stool.   He has also been experiencing some weakness and has been very tired.  Infectious Disease told him that it may be secondary to his antibiotics.  He continues IV antibiotics with ID.  He has held off of PT.  No drainage from the incision.  No new symptoms     Overall, patient's symptoms are improved    ROS:    Constiutional:Pt denies fever, chills, nausea, or vomiting.  MSK:as above        Objective      Past Medical History  Past Medical History:   Diagnosis Date    Arthritis     Edema     Lower extremities    GERD (gastroesophageal reflux disease)     History of transfusion     Hypertension     Insomnia     Plantar fascial fibromatosis     Tibia fracture     Left         Physical Exam  There were no vitals taken for this visit.    There is no height or weight on file to calculate BMI.    Patient is well nourished and well developed.        Ortho Exam  Left hip exam: incision is healing well.  No drainage and no sign of infection.  No pain with hip motion.  NVI distally. Ambulating with a walker.    Imaging/Labs/EMG Reviewed:  Imaging Results (Last 24 Hours)       ** No results found for the last 24 hours. **              Assessment    Assessment:  1. Status post total hip replacement, left        Plan:  Recommend over the counter anti-inflammatories for pain and/or swelling  Doing well s/p L NAVNEET 4/23/24 with subsequent I&D with poly and head exchange 5/14/24.  Sutures removed today.  The incision is healing very well.  Patient will resume PT working on strength, motion and gait  training at [facility] [place] by [facility].  He will continue with IV abx per ID.  RTC to see Dr. Durbin in 3 to 4 weeks, sooner if needed.      Idalia Castro PA-C  06/05/24  15:53 EDT      Dragon disclaimer:  Much of this encounter note is an electronic transcription/translation of spoken language to printed text. The electronic translation of spoken language may permit erroneous, or at times, nonsensical words or phrases to be inadvertently transcribed; Although I have reviewed the note for such errors, some may still exist.

## 2024-06-10 ENCOUNTER — TRANSCRIBE ORDERS (OUTPATIENT)
Dept: LAB | Facility: HOSPITAL | Age: 79
End: 2024-06-10
Payer: MEDICARE

## 2024-06-10 ENCOUNTER — LAB (OUTPATIENT)
Dept: LAB | Facility: HOSPITAL | Age: 79
End: 2024-06-10
Payer: MEDICARE

## 2024-06-10 DIAGNOSIS — B96.89 BACTERIAL CHOLANGITIS: Primary | ICD-10-CM

## 2024-06-10 DIAGNOSIS — L03.116 CELLULITIS OF LEFT FOOT: ICD-10-CM

## 2024-06-10 DIAGNOSIS — B96.89 BACTERIAL CHOLANGITIS: ICD-10-CM

## 2024-06-10 DIAGNOSIS — T84.52XD INFECTION ASSOCIATED WITH INTERNAL LEFT HIP PROSTHESIS, SUBSEQUENT ENCOUNTER: ICD-10-CM

## 2024-06-10 DIAGNOSIS — K83.09 BACTERIAL CHOLANGITIS: Primary | ICD-10-CM

## 2024-06-10 DIAGNOSIS — K83.09 BACTERIAL CHOLANGITIS: ICD-10-CM

## 2024-06-10 LAB
ALBUMIN SERPL-MCNC: 2.9 G/DL (ref 3.5–5.2)
ALBUMIN/GLOB SERPL: 0.6 G/DL
ALP SERPL-CCNC: 219 U/L (ref 39–117)
ALT SERPL W P-5'-P-CCNC: 28 U/L (ref 1–41)
ANION GAP SERPL CALCULATED.3IONS-SCNC: 8 MMOL/L (ref 5–15)
AST SERPL-CCNC: 46 U/L (ref 1–40)
BASOPHILS # BLD AUTO: 0.03 10*3/MM3 (ref 0–0.2)
BASOPHILS NFR BLD AUTO: 0.6 % (ref 0–1.5)
BILIRUB SERPL-MCNC: 0.3 MG/DL (ref 0–1.2)
BUN SERPL-MCNC: 11 MG/DL (ref 8–23)
BUN/CREAT SERPL: 10.5 (ref 7–25)
CALCIUM SPEC-SCNC: 8.7 MG/DL (ref 8.6–10.5)
CHLORIDE SERPL-SCNC: 98 MMOL/L (ref 98–107)
CO2 SERPL-SCNC: 29 MMOL/L (ref 22–29)
CREAT SERPL-MCNC: 1.05 MG/DL (ref 0.76–1.27)
CRP SERPL-MCNC: 2.81 MG/DL (ref 0–0.5)
DEPRECATED RDW RBC AUTO: 42.8 FL (ref 37–54)
EGFRCR SERPLBLD CKD-EPI 2021: 73.1 ML/MIN/1.73
EOSINOPHIL # BLD AUTO: 0.34 10*3/MM3 (ref 0–0.4)
EOSINOPHIL NFR BLD AUTO: 6.4 % (ref 0.3–6.2)
ERYTHROCYTE [DISTWIDTH] IN BLOOD BY AUTOMATED COUNT: 13.2 % (ref 12.3–15.4)
ERYTHROCYTE [SEDIMENTATION RATE] IN BLOOD: 58 MM/HR (ref 0–20)
GLOBULIN UR ELPH-MCNC: 4.8 GM/DL
GLUCOSE SERPL-MCNC: 120 MG/DL (ref 65–99)
HCT VFR BLD AUTO: 35.5 % (ref 37.5–51)
HGB BLD-MCNC: 11.3 G/DL (ref 13–17.7)
IMM GRANULOCYTES # BLD AUTO: 0.02 10*3/MM3 (ref 0–0.05)
IMM GRANULOCYTES NFR BLD AUTO: 0.4 % (ref 0–0.5)
LYMPHOCYTES # BLD AUTO: 0.94 10*3/MM3 (ref 0.7–3.1)
LYMPHOCYTES NFR BLD AUTO: 17.8 % (ref 19.6–45.3)
MCH RBC QN AUTO: 28.4 PG (ref 26.6–33)
MCHC RBC AUTO-ENTMCNC: 31.8 G/DL (ref 31.5–35.7)
MCV RBC AUTO: 89.2 FL (ref 79–97)
MONOCYTES # BLD AUTO: 0.31 10*3/MM3 (ref 0.1–0.9)
MONOCYTES NFR BLD AUTO: 5.9 % (ref 5–12)
NEUTROPHILS NFR BLD AUTO: 3.64 10*3/MM3 (ref 1.7–7)
NEUTROPHILS NFR BLD AUTO: 68.9 % (ref 42.7–76)
NRBC BLD AUTO-RTO: 0 /100 WBC (ref 0–0.2)
PLATELET # BLD AUTO: 251 10*3/MM3 (ref 140–450)
PMV BLD AUTO: 10.1 FL (ref 6–12)
POTASSIUM SERPL-SCNC: 4.3 MMOL/L (ref 3.5–5.2)
PROT SERPL-MCNC: 7.7 G/DL (ref 6–8.5)
RBC # BLD AUTO: 3.98 10*6/MM3 (ref 4.14–5.8)
SODIUM SERPL-SCNC: 135 MMOL/L (ref 136–145)
WBC NRBC COR # BLD AUTO: 5.28 10*3/MM3 (ref 3.4–10.8)

## 2024-06-10 PROCEDURE — 80053 COMPREHEN METABOLIC PANEL: CPT | Performed by: INTERNAL MEDICINE

## 2024-06-10 PROCEDURE — 86140 C-REACTIVE PROTEIN: CPT | Performed by: INTERNAL MEDICINE

## 2024-06-10 PROCEDURE — 85652 RBC SED RATE AUTOMATED: CPT

## 2024-06-10 PROCEDURE — 85025 COMPLETE CBC W/AUTO DIFF WBC: CPT | Performed by: INTERNAL MEDICINE

## 2024-06-10 PROCEDURE — 36415 COLL VENOUS BLD VENIPUNCTURE: CPT | Performed by: INTERNAL MEDICINE

## 2024-06-11 LAB
FUNGUS WND CULT: NORMAL
MYCOBACTERIUM SPEC CULT: NORMAL
NIGHT BLUE STAIN TISS: NORMAL

## 2024-06-12 ENCOUNTER — TREATMENT (OUTPATIENT)
Dept: PHYSICAL THERAPY | Facility: CLINIC | Age: 79
End: 2024-06-12
Payer: MEDICARE

## 2024-06-12 DIAGNOSIS — Z96.642 STATUS POST TOTAL HIP REPLACEMENT, LEFT: Primary | ICD-10-CM

## 2024-06-12 DIAGNOSIS — Z74.09 IMPAIRED FUNCTIONAL MOBILITY, BALANCE, GAIT, AND ENDURANCE: ICD-10-CM

## 2024-06-12 DIAGNOSIS — R29.898 HIP WEAKNESS: ICD-10-CM

## 2024-06-12 NOTE — PROGRESS NOTES
Physical Therapy Re-evaluation             [address] [facility] [address]    [place] [address]    Patient: Papito Salinas   : 1945  Diagnosis/ICD-10 Code:  Status post total hip replacement, left [Z96.642]  Referring practitioner: RANDALL Stoner*  Date of Initial Visit: 2024  Today's Date: 2024  Patient seen for 6 session         Visit Diagnoses:    ICD-10-CM ICD-9-CM   1. Status post total hip replacement, left  Z96.642 V43.64   2. Hip weakness  R29.898 729.89   3. Impaired functional mobility, balance, gait, and endurance  Z74.09 V49.89         Subjective Questionnaire: LEFS:       Subjective Evaluation    History of Present Illness  Mechanism of injury: L NAVNEET 24, underwent 5 visits PT. Presented to 5th PT visit w/ increased redness/drainage at surgical site, recommended pt return to Ortho office to assess for infection.     I&D left hip with head and liner exchange 2023  Undergoing daily IV antibiotics at [facility], have decreased his energy  Seen at the ED on 24 after he fell off a stool    Having minimal pn  Still having some difficulty getting up/down from low surfaces, getting socks and shoes donned  Can walk from one room to another w/o walker but w/ a limp    F/u Dr. Durbin 24    Quality of life: good    Pain  Current pain ratin  At best pain ratin  At worst pain rating: 3  Progression: improved    Patient Goals  Patient goals for therapy: decreased pain, improved balance, increased motion, increased strength and independence with ADLs/IADLs             Treatment  See Exercise, Manual, and Modality Logs for complete treatment.     Access Code: [email or web address]  URL: [email or web address]  Date: 2024  Prepared by: Ellyn Tran    Exercises  - Supine Knee Extension Strengthening  - 2 x daily - 1-2 sets - 10 reps  - Supine Heel Slide with Strap  - 2 x daily - 1-2 sets - 10 reps  - Supine Bridge  - 2 x daily - 1-2 sets - 10 reps  - Hooklying  Clamshell with Resistance  - 2 x daily - 1-2 sets - 10 reps  - Standing Heel Raises  - 2 x daily - 2-3 sets - 10 reps  - Standing Hip Abduction with Counter Support  - 2 x daily - 1-2 sets - 10 reps    Objective          Observations     Additional Hip Observation Details  (-) drainage or significant erythema  Pedal pulses intact  Sensation intact    Tenderness     Additional Tenderness Details  TTP ant/lateral hip, hip flexors    Active Range of Motion   Left Hip   Flexion: 80 (hooklying) degrees   External rotation (90/90): 10 degrees   Internal rotation (90/90): 10 degrees     Right Hip   Flexion: 100 degrees   External rotation (90/90): 28 degrees   Internal rotation (90/90): 22 degrees     Strength/Myotome Testing     Left Hip   Planes of Motion   Flexion: 4  Extension: 4 (tested w/ bridge)  Abduction: 3-  External rotation: 3-    Right Hip   Planes of Motion   Flexion: 5  External rotation: 5    Left Knee   Flexion: 5  Extension: 4+  Quadriceps contraction: good    Right Knee   Flexion: 5  Extension: 5    Tests     Additional Tests Details  Richelle (-)    Ambulation     Comments   Pt presents ambulating w/ RW, shortened stride length, mildly antalgic. Requires use of hands w/ STS t/f, some lack of control w/ lowering. Able to lift leg over EOB w/o UE assist. Rolls on table w/o assist.    Functional Assessment     Single Leg Stance   Left: 0 (unable to yovany) seconds            Assessment & Plan       Assessment  Impairments: abnormal gait, abnormal or restricted ROM, activity intolerance, impaired balance, impaired physical strength, lacks appropriate home exercise program, pain with function and weight-bearing intolerance   Functional limitations: carrying objects, sleeping, walking, uncomfortable because of pain, moving in bed, sitting, standing and stooping   Assessment details: Re-eval performed. Pt underwent NAVNEET w/ anteiror appropach 4/23/24, developed prosthetic infection and had to undergo I&D with head and  liner exchange 5/14/2023. Referred back to PT for continued strengthening and gait training. Pt on daily IV antibiotics, which he states have dec his energy level. Currently not working on home exercises. Does not demo any apparent regression in terms of hip strength/mobility relative to previous PT assessment, but demonstrates ongoing impairments in strength (primarily abduction), mobility, gait mechanics, balance. Good tolerance to exercise in clinic today. Issued modified HEP.    Barriers to therapy: n/a  Prognosis: good    Goals  Plan Goals: STG 4 wks  1) Pt to be compliant w/ initial HEP for ROM, strength, and symptom mgmt.-MET  2) Pt to report pn at rest and w/ household ambulation to be no greater than 5/10.-MET  3) Pt to improve L hip strength to 3+/5 or better in all planes.-PARTIALLY MET  4) Pt to improve L hip AROM to 80 deg flxn, 15 deg ER or better to assist w/ lower body hygiene/dressing.-PARTIALLY MET  5) Pt to improve LEFS score to 30/80 or better to reflect improved pn and function.-PROGRESSING  6) Pt to progress to ambulation w/ SPC w/ appropriate gait mechanics.-ONGOING    LTG 8 wks  1) Pt to be independent w/ long term HEP and self mgmt.-ONGOING  2) Pt to progress to independent ambulation over household and community distances w/ appropriate gait mechanics.-ONGOING  3) Pt to improve L hip strength to 4+/5 or better in all planes.-PROGRESSING  4) Pt to improve L hip ROM to 100 deg flxn, 25 deg ER or better to assist w/ lower body dressing/hygiene.-PROGRESSING  5) Pt to improve LEFS score to 45/80 or better to reflect improved pn and function.-PROGRESSING  6) Pt to maintain SLS on LLE x10 sec or greater to reflect improved strength/stability.-ONGOING            Plan  Therapy options: will be seen for skilled therapy services  Planned modality interventions: cryotherapy, TENS and thermotherapy (hydrocollator packs)  Planned therapy interventions: abdominal trunk stabilization,  balance/weight-bearing training, flexibility, functional ROM exercises, gait training, home exercise program, joint mobilization, manual therapy, neuromuscular re-education, soft tissue mobilization, strengthening, stretching, therapeutic activities and transfer training  Frequency: 2x week  Duration in weeks: 12  Treatment plan discussed with: patient  Plan details: TE/TA/NMR/MT to address noted deficits, modalities as indicated for pn control        History # of Personal Factors and/or Comorbidities: LOW (0)  Examination of Body System(s): # of elements: LOW (1-2)  Clinical Presentation: EVOLVING  Clinical Decision Making: LOW       Timed:         Manual Therapy:    0     mins  58954;     Therapeutic Exercise:    15     mins  09364;     Neuromuscular Ean:    10    mins  44977;    Therapeutic Activity:     0     mins  28184;     Gait Trainin     mins  66979;     Ultrasound:     0     mins  00408;    Ionto                               0    mins   89022  Self Care                       0     mins   38630  Canalith Repos    0     mins 50129      Un-Timed:  Electrical Stimulation:    0     mins  04622 (MC );  Dry Needling     0     mins self-pay  Traction     0     mins 94691  Low Eval     0     Mins  34539  Mod Eval     0     Mins  15807  High Eval                       0     Mins  31652  Re-Eval  25 Mins 42310        Timed Treatment:   25   mins   Total Treatment:     50   mins          PT: Ellyn Tran PT     [place] License: #[id number]    Electronically signed by Ellyn Tran PT, 24, 3:30 PM EDT    Certification Period: 2024 thru 2024  I certify that the therapy services are furnished while this patient is under my care.  The services outlined above are required by this patient, and will be reviewed every 90 days.         Physician Signature:__________________________________________________    PHYSICIAN: Idalia Castro PA-C      DATE:     Please sign and return via fax to  [phone]. Thank you, [facility].

## 2024-06-17 ENCOUNTER — LAB (OUTPATIENT)
Dept: LAB | Facility: HOSPITAL | Age: 79
End: 2024-06-17
Payer: MEDICARE

## 2024-06-17 ENCOUNTER — TRANSCRIBE ORDERS (OUTPATIENT)
Dept: LAB | Facility: HOSPITAL | Age: 79
End: 2024-06-17
Payer: MEDICARE

## 2024-06-17 ENCOUNTER — TREATMENT (OUTPATIENT)
Dept: PHYSICAL THERAPY | Facility: CLINIC | Age: 79
End: 2024-06-17
Payer: MEDICARE

## 2024-06-17 DIAGNOSIS — L03.116 CELLULITIS OF LEFT LOWER EXTREMITY: ICD-10-CM

## 2024-06-17 DIAGNOSIS — T84.52XD INFECTION ASSOCIATED WITH INTERNAL LEFT HIP PROSTHESIS, SUBSEQUENT ENCOUNTER: ICD-10-CM

## 2024-06-17 DIAGNOSIS — K83.09 BACTERIAL CHOLANGITIS: ICD-10-CM

## 2024-06-17 DIAGNOSIS — K83.09 BACTERIAL CHOLANGITIS: Primary | ICD-10-CM

## 2024-06-17 DIAGNOSIS — R29.898 HIP WEAKNESS: ICD-10-CM

## 2024-06-17 DIAGNOSIS — Z96.642 STATUS POST TOTAL HIP REPLACEMENT, LEFT: Primary | ICD-10-CM

## 2024-06-17 DIAGNOSIS — B96.89 BACTERIAL CHOLANGITIS: ICD-10-CM

## 2024-06-17 DIAGNOSIS — B96.89 BACTERIAL CHOLANGITIS: Primary | ICD-10-CM

## 2024-06-17 DIAGNOSIS — Z74.09 IMPAIRED FUNCTIONAL MOBILITY, BALANCE, GAIT, AND ENDURANCE: ICD-10-CM

## 2024-06-17 LAB
ALBUMIN SERPL-MCNC: 2.9 G/DL (ref 3.5–5.2)
ALBUMIN/GLOB SERPL: 0.7 G/DL
ALP SERPL-CCNC: 218 U/L (ref 39–117)
ALT SERPL W P-5'-P-CCNC: 38 U/L (ref 1–41)
ANION GAP SERPL CALCULATED.3IONS-SCNC: 7 MMOL/L (ref 5–15)
AST SERPL-CCNC: 57 U/L (ref 1–40)
BASOPHILS # BLD AUTO: 0.03 10*3/MM3 (ref 0–0.2)
BASOPHILS NFR BLD AUTO: 0.5 % (ref 0–1.5)
BILIRUB SERPL-MCNC: 0.2 MG/DL (ref 0–1.2)
BUN SERPL-MCNC: 10 MG/DL (ref 8–23)
BUN/CREAT SERPL: 8.9 (ref 7–25)
CALCIUM SPEC-SCNC: 8.6 MG/DL (ref 8.6–10.5)
CHLORIDE SERPL-SCNC: 102 MMOL/L (ref 98–107)
CO2 SERPL-SCNC: 31 MMOL/L (ref 22–29)
CREAT SERPL-MCNC: 1.12 MG/DL (ref 0.76–1.27)
CRP SERPL-MCNC: 1.84 MG/DL (ref 0–0.5)
DEPRECATED RDW RBC AUTO: 42.7 FL (ref 37–54)
EGFRCR SERPLBLD CKD-EPI 2021: 67.7 ML/MIN/1.73
EOSINOPHIL # BLD AUTO: 0.36 10*3/MM3 (ref 0–0.4)
EOSINOPHIL NFR BLD AUTO: 5.9 % (ref 0.3–6.2)
ERYTHROCYTE [DISTWIDTH] IN BLOOD BY AUTOMATED COUNT: 13.1 % (ref 12.3–15.4)
ERYTHROCYTE [SEDIMENTATION RATE] IN BLOOD: 60 MM/HR (ref 0–20)
GLOBULIN UR ELPH-MCNC: 4.4 GM/DL
GLUCOSE SERPL-MCNC: 109 MG/DL (ref 65–99)
HCT VFR BLD AUTO: 35.9 % (ref 37.5–51)
HGB BLD-MCNC: 11.3 G/DL (ref 13–17.7)
IMM GRANULOCYTES # BLD AUTO: 0.02 10*3/MM3 (ref 0–0.05)
IMM GRANULOCYTES NFR BLD AUTO: 0.3 % (ref 0–0.5)
LYMPHOCYTES # BLD AUTO: 1.09 10*3/MM3 (ref 0.7–3.1)
LYMPHOCYTES NFR BLD AUTO: 17.8 % (ref 19.6–45.3)
MCH RBC QN AUTO: 28 PG (ref 26.6–33)
MCHC RBC AUTO-ENTMCNC: 31.5 G/DL (ref 31.5–35.7)
MCV RBC AUTO: 89.1 FL (ref 79–97)
MONOCYTES # BLD AUTO: 0.45 10*3/MM3 (ref 0.1–0.9)
MONOCYTES NFR BLD AUTO: 7.3 % (ref 5–12)
NEUTROPHILS NFR BLD AUTO: 4.18 10*3/MM3 (ref 1.7–7)
NEUTROPHILS NFR BLD AUTO: 68.2 % (ref 42.7–76)
NRBC BLD AUTO-RTO: 0 /100 WBC (ref 0–0.2)
PLATELET # BLD AUTO: 216 10*3/MM3 (ref 140–450)
PMV BLD AUTO: 10.4 FL (ref 6–12)
POTASSIUM SERPL-SCNC: 4.3 MMOL/L (ref 3.5–5.2)
PROT SERPL-MCNC: 7.3 G/DL (ref 6–8.5)
RBC # BLD AUTO: 4.03 10*6/MM3 (ref 4.14–5.8)
SODIUM SERPL-SCNC: 140 MMOL/L (ref 136–145)
WBC NRBC COR # BLD AUTO: 6.13 10*3/MM3 (ref 3.4–10.8)

## 2024-06-17 PROCEDURE — 85025 COMPLETE CBC W/AUTO DIFF WBC: CPT

## 2024-06-17 PROCEDURE — 80053 COMPREHEN METABOLIC PANEL: CPT

## 2024-06-17 PROCEDURE — 85652 RBC SED RATE AUTOMATED: CPT

## 2024-06-17 PROCEDURE — 86140 C-REACTIVE PROTEIN: CPT

## 2024-06-17 PROCEDURE — 36415 COLL VENOUS BLD VENIPUNCTURE: CPT

## 2024-06-17 NOTE — PROGRESS NOTES
Physical Therapy Daily Treatment Note                  [address] [facility] [facility] [address]  [place] [address]      Patient: Papito Salinas   : 1945  Diagnosis/ICD-10 Code:  Status post total hip replacement, left [Z96.642]  Referring practitioner: RANDALL Stoner*  Date of Initial Visit: Type: THERAPY  Noted: 2024  Today's Date: 2024  Patient seen for 7 sessions             Subjective   Papito Salinas reports: doing well with the pain at this time. Still getting IV antibiotics. Legs and arms feel tired at times depending on what he is doing.     Objective   Unable to complete SLR without assist.   See Exercise, Manual, and Modality Logs for complete treatment.       Assessment/Plan  Pt was unable to complete SLR without assist due to weakness. Started on modified version in hooklying with a kick out to straight leg position to bent position again that was a challenge but independent. Pt did need cues to improve hip abduction in standing due to tends to lean forward and to the side. Able to complete sit to stand from airex on mat with no UE assist x5 reps.   Progress per Plan of Care and Progress strengthening /stabilization /functional activity           Timed:  Manual Therapy:         mins  98431;  Therapeutic Exercise:    23     mins  47645;     Neuromuscular Ean:    15    mins  71078;    Therapeutic Activity:          mins  62789;     Gait Training:           mins  63611;     Ultrasound:          mins  89961;    Electrical Stimulation:         mins  58935;  Iontophoresis          mins  93620    Untimed:  Electrical Stimulation:         mins  64577 ( );  Mechanical Traction:         mins  14174;   Fluidotherapy          mins  87705    Timed Treatment:   38   mins   Total Treatment:     38   mins        Ellyn Harrison PTA  Physical Therapist Assistant

## 2024-06-18 LAB
FUNGUS WND CULT: NORMAL
MYCOBACTERIUM SPEC CULT: NORMAL
NIGHT BLUE STAIN TISS: NORMAL

## 2024-06-19 ENCOUNTER — TREATMENT (OUTPATIENT)
Dept: PHYSICAL THERAPY | Facility: CLINIC | Age: 79
End: 2024-06-19
Payer: MEDICARE

## 2024-06-19 DIAGNOSIS — R29.898 HIP WEAKNESS: ICD-10-CM

## 2024-06-19 DIAGNOSIS — Z74.09 IMPAIRED FUNCTIONAL MOBILITY, BALANCE, GAIT, AND ENDURANCE: ICD-10-CM

## 2024-06-19 DIAGNOSIS — Z96.642 STATUS POST TOTAL HIP REPLACEMENT, LEFT: Primary | ICD-10-CM

## 2024-06-19 NOTE — PROGRESS NOTES
Physical Therapy Daily Treatment Note                  [address] [facility] [address]  [place] [address]      Patient: Papito Salinas   : 1945  Diagnosis/ICD-10 Code:  Status post total hip replacement, left [Z96.642]  Referring practitioner: RANDALL Stoner*  Date of Initial Visit: Type: THERAPY  Noted: 2024  Today's Date: 2024  Patient seen for 8 sessions             Subjective   Papito Salinas reports: still using the walker out in the community but taking small walks around the house with no walker. Can tell he is still limping some. Been doing some exercises in his chair but not the sheet given.     Objective   See Exercise, Manual, and Modality Logs for complete treatment.       Assessment/Plan  Gluteus medius in standing fatigue quickly needing cues while performing hip abduction. Pt has not been compliant with HEP given when went back over the importance of working on the exercises given.   Progress per Plan of Care and Progress strengthening /stabilization /functional activity           Timed:  Manual Therapy:         mins  30063;  Therapeutic Exercise:    26     mins  50817;     Neuromuscular Ean:    14    mins  03222;    Therapeutic Activity:          mins  39677;     Gait Training:           mins  03173;     Ultrasound:          mins  54527;    Electrical Stimulation:         mins  67526;  Iontophoresis          mins  83299    Untimed:  Electrical Stimulation:         mins  00145 ( );  Mechanical Traction:         mins  09391;   Fluidotherapy          mins  07063    Timed Treatment:   40   mins   Total Treatment:     40   mins        Ellyn Harrison PTA  Physical Therapist Assistant

## 2024-06-20 DIAGNOSIS — I10 ESSENTIAL HYPERTENSION: ICD-10-CM

## 2024-06-20 RX ORDER — METOPROLOL SUCCINATE 100 MG/1
TABLET, EXTENDED RELEASE ORAL
Qty: 90 TABLET | Refills: 3 | Status: SHIPPED | OUTPATIENT
Start: 2024-06-20

## 2024-06-24 ENCOUNTER — TRANSCRIBE ORDERS (OUTPATIENT)
Dept: LAB | Facility: HOSPITAL | Age: 79
End: 2024-06-24
Payer: MEDICARE

## 2024-06-24 ENCOUNTER — TREATMENT (OUTPATIENT)
Dept: PHYSICAL THERAPY | Facility: CLINIC | Age: 79
End: 2024-06-24
Payer: MEDICARE

## 2024-06-24 ENCOUNTER — LAB (OUTPATIENT)
Dept: LAB | Facility: HOSPITAL | Age: 79
End: 2024-06-24
Payer: MEDICARE

## 2024-06-24 DIAGNOSIS — K83.09 BACTERIAL CHOLANGITIS: Primary | ICD-10-CM

## 2024-06-24 DIAGNOSIS — R29.898 HIP WEAKNESS: ICD-10-CM

## 2024-06-24 DIAGNOSIS — B96.89 BACTERIAL CHOLANGITIS: Primary | ICD-10-CM

## 2024-06-24 DIAGNOSIS — L03.116 CELLULITIS OF LEFT LOWER EXTREMITY: ICD-10-CM

## 2024-06-24 DIAGNOSIS — Z74.09 IMPAIRED FUNCTIONAL MOBILITY, BALANCE, GAIT, AND ENDURANCE: ICD-10-CM

## 2024-06-24 DIAGNOSIS — T84.52XD INFECTION ASSOCIATED WITH INTERNAL LEFT HIP PROSTHESIS, SUBSEQUENT ENCOUNTER: ICD-10-CM

## 2024-06-24 DIAGNOSIS — Z96.642 STATUS POST TOTAL HIP REPLACEMENT, LEFT: Primary | ICD-10-CM

## 2024-06-24 DIAGNOSIS — B96.89 BACTERIAL CHOLANGITIS: ICD-10-CM

## 2024-06-24 DIAGNOSIS — K83.09 BACTERIAL CHOLANGITIS: ICD-10-CM

## 2024-06-24 LAB
ALBUMIN SERPL-MCNC: 3 G/DL (ref 3.5–5.2)
ALBUMIN/GLOB SERPL: 0.7 G/DL
ALP SERPL-CCNC: 200 U/L (ref 39–117)
ALT SERPL W P-5'-P-CCNC: 28 U/L (ref 1–41)
ANION GAP SERPL CALCULATED.3IONS-SCNC: 7 MMOL/L (ref 5–15)
AST SERPL-CCNC: 37 U/L (ref 1–40)
BASOPHILS # BLD AUTO: 0.02 10*3/MM3 (ref 0–0.2)
BASOPHILS NFR BLD AUTO: 0.4 % (ref 0–1.5)
BILIRUB SERPL-MCNC: 0.3 MG/DL (ref 0–1.2)
BUN SERPL-MCNC: 11 MG/DL (ref 8–23)
BUN/CREAT SERPL: 9.3 (ref 7–25)
CALCIUM SPEC-SCNC: 8.7 MG/DL (ref 8.6–10.5)
CHLORIDE SERPL-SCNC: 102 MMOL/L (ref 98–107)
CO2 SERPL-SCNC: 30 MMOL/L (ref 22–29)
CREAT SERPL-MCNC: 1.18 MG/DL (ref 0.76–1.27)
CRP SERPL-MCNC: 1.36 MG/DL (ref 0–0.5)
DEPRECATED RDW RBC AUTO: 42.2 FL (ref 37–54)
EGFRCR SERPLBLD CKD-EPI 2021: 63.6 ML/MIN/1.73
EOSINOPHIL # BLD AUTO: 0.29 10*3/MM3 (ref 0–0.4)
EOSINOPHIL NFR BLD AUTO: 5.5 % (ref 0.3–6.2)
ERYTHROCYTE [DISTWIDTH] IN BLOOD BY AUTOMATED COUNT: 13.2 % (ref 12.3–15.4)
ERYTHROCYTE [SEDIMENTATION RATE] IN BLOOD: 64 MM/HR (ref 0–20)
GLOBULIN UR ELPH-MCNC: 4.5 GM/DL
GLUCOSE SERPL-MCNC: 120 MG/DL (ref 65–99)
HCT VFR BLD AUTO: 35.3 % (ref 37.5–51)
HGB BLD-MCNC: 11.3 G/DL (ref 13–17.7)
IMM GRANULOCYTES # BLD AUTO: 0.01 10*3/MM3 (ref 0–0.05)
IMM GRANULOCYTES NFR BLD AUTO: 0.2 % (ref 0–0.5)
LYMPHOCYTES # BLD AUTO: 0.99 10*3/MM3 (ref 0.7–3.1)
LYMPHOCYTES NFR BLD AUTO: 18.6 % (ref 19.6–45.3)
MCH RBC QN AUTO: 27.9 PG (ref 26.6–33)
MCHC RBC AUTO-ENTMCNC: 32 G/DL (ref 31.5–35.7)
MCV RBC AUTO: 87.2 FL (ref 79–97)
MONOCYTES # BLD AUTO: 0.37 10*3/MM3 (ref 0.1–0.9)
MONOCYTES NFR BLD AUTO: 7 % (ref 5–12)
NEUTROPHILS NFR BLD AUTO: 3.64 10*3/MM3 (ref 1.7–7)
NEUTROPHILS NFR BLD AUTO: 68.3 % (ref 42.7–76)
NRBC BLD AUTO-RTO: 0 /100 WBC (ref 0–0.2)
PLATELET # BLD AUTO: 199 10*3/MM3 (ref 140–450)
PMV BLD AUTO: 10.1 FL (ref 6–12)
POTASSIUM SERPL-SCNC: 4.2 MMOL/L (ref 3.5–5.2)
PROT SERPL-MCNC: 7.5 G/DL (ref 6–8.5)
RBC # BLD AUTO: 4.05 10*6/MM3 (ref 4.14–5.8)
SODIUM SERPL-SCNC: 139 MMOL/L (ref 136–145)
WBC NRBC COR # BLD AUTO: 5.32 10*3/MM3 (ref 3.4–10.8)

## 2024-06-24 PROCEDURE — 36415 COLL VENOUS BLD VENIPUNCTURE: CPT

## 2024-06-24 PROCEDURE — 85652 RBC SED RATE AUTOMATED: CPT

## 2024-06-24 PROCEDURE — 97110 THERAPEUTIC EXERCISES: CPT | Performed by: PHYSICAL THERAPIST

## 2024-06-24 PROCEDURE — 85025 COMPLETE CBC W/AUTO DIFF WBC: CPT

## 2024-06-24 PROCEDURE — 80053 COMPREHEN METABOLIC PANEL: CPT

## 2024-06-24 PROCEDURE — 97112 NEUROMUSCULAR REEDUCATION: CPT | Performed by: PHYSICAL THERAPIST

## 2024-06-24 PROCEDURE — 97530 THERAPEUTIC ACTIVITIES: CPT | Performed by: PHYSICAL THERAPIST

## 2024-06-24 PROCEDURE — 86140 C-REACTIVE PROTEIN: CPT

## 2024-06-24 NOTE — PROGRESS NOTES
"                          Physical Therapy Daily Treatment Note                  [address] [facility] [address]  [place] [address]      Patient: Papito Salinas   : 1945  Diagnosis/ICD-10 Code:  Status post total hip replacement, left [Z96.642]  Referring practitioner: RANDALL Stoner*  Date of Initial Visit: Type: THERAPY  Noted: 2024  Today's Date: 2024  Patient seen for 9 sessions             Subjective   Papito Salinas reports: feeling a lot stronger. Have a flight of stairs but using the right leg to get up and \"step to\" with left.     Objective          Active Range of Motion   Left Knee   Extensor la degrees       See Exercise, Manual, and Modality Logs for complete treatment.       Assessment/Plan  Pt was able to complete a SLR today with no assist and no increase in pain. Continue to educate patient on the importance of building strength to improve balance during transitional movements. Updated HEP to continue to challenge him daily. Started a 4 in step up for left leg with noted difficulty but tolerated well in clinic.     Access Code: [email or web address]  URL: [email or web address]  Date: 2024  Prepared by: Ellyn Harrison    Exercises  - Beginner Clam  - 1 x daily - 7 x weekly - 1 sets - 10 reps  - Active Straight Leg Raise with Quad Set  - 1 x daily - 7 x weekly - 1 sets - 10 reps  - Bridge with Hip Abduction and Resistance  - 1 x daily - 7 x weekly - 2 sets - 10 reps  - Standing Hip Abduction  - 1 x daily - 7 x weekly - 2 sets - 10 reps  - Standing Heel Raises  - 1 x daily - 7 x weekly - 2 sets - 10 reps  Progress per Plan of Care and Progress strengthening /stabilization /functional activity           Timed:  Manual Therapy:         mins  35541;  Therapeutic Exercise:    16     mins  17864;     Neuromuscular Ean:    14    mins  62588;    Therapeutic Activity:     8     mins  40254;     Gait Training:           mins  26781;     Ultrasound:          mins  26474; "    Electrical Stimulation:         mins  51124;  Iontophoresis          mins  58445    Untimed:  Electrical Stimulation:         mins  02930 ( );  Mechanical Traction:         mins  92160;   Fluidotherapy          mins  49856    Timed Treatment:   38   mins   Total Treatment:     38   mins        Ellyn Harrison PTA  Physical Therapist Assistant

## 2024-06-25 LAB
FUNGUS WND CULT: NORMAL
MYCOBACTERIUM SPEC CULT: NORMAL
NIGHT BLUE STAIN TISS: NORMAL

## 2024-07-01 ENCOUNTER — TRANSCRIBE ORDERS (OUTPATIENT)
Dept: LAB | Facility: HOSPITAL | Age: 79
End: 2024-07-01
Payer: COMMERCIAL

## 2024-07-01 ENCOUNTER — LAB (OUTPATIENT)
Dept: LAB | Facility: HOSPITAL | Age: 79
End: 2024-07-01
Payer: MEDICARE

## 2024-07-01 ENCOUNTER — OFFICE VISIT (OUTPATIENT)
Dept: ORTHOPEDIC SURGERY | Facility: CLINIC | Age: 79
End: 2024-07-01
Payer: MEDICARE

## 2024-07-01 DIAGNOSIS — L03.116 CELLULITIS OF LEFT FOOT: ICD-10-CM

## 2024-07-01 DIAGNOSIS — Z96.642 STATUS POST TOTAL HIP REPLACEMENT, LEFT: Primary | ICD-10-CM

## 2024-07-01 DIAGNOSIS — B96.89 BACTERIAL CHOLANGITIS: ICD-10-CM

## 2024-07-01 DIAGNOSIS — K83.09 BACTERIAL CHOLANGITIS: Primary | ICD-10-CM

## 2024-07-01 DIAGNOSIS — T84.52XD INFECTION ASSOCIATED WITH INTERNAL LEFT HIP PROSTHESIS, SUBSEQUENT ENCOUNTER: ICD-10-CM

## 2024-07-01 DIAGNOSIS — Z47.89 ORTHOPEDIC AFTERCARE: ICD-10-CM

## 2024-07-01 DIAGNOSIS — K83.09 BACTERIAL CHOLANGITIS: ICD-10-CM

## 2024-07-01 DIAGNOSIS — B96.89 BACTERIAL CHOLANGITIS: Primary | ICD-10-CM

## 2024-07-01 LAB
ALBUMIN SERPL-MCNC: 3.1 G/DL (ref 3.5–5.2)
ALBUMIN/GLOB SERPL: 0.7 G/DL
ALP SERPL-CCNC: 166 U/L (ref 39–117)
ALT SERPL W P-5'-P-CCNC: 15 U/L (ref 1–41)
ANION GAP SERPL CALCULATED.3IONS-SCNC: 7 MMOL/L (ref 5–15)
AST SERPL-CCNC: 19 U/L (ref 1–40)
BASOPHILS # BLD AUTO: 0.01 10*3/MM3 (ref 0–0.2)
BASOPHILS NFR BLD AUTO: 0.2 % (ref 0–1.5)
BILIRUB SERPL-MCNC: 0.4 MG/DL (ref 0–1.2)
BUN SERPL-MCNC: 13 MG/DL (ref 8–23)
BUN/CREAT SERPL: 12 (ref 7–25)
CALCIUM SPEC-SCNC: 8.7 MG/DL (ref 8.6–10.5)
CHLORIDE SERPL-SCNC: 103 MMOL/L (ref 98–107)
CO2 SERPL-SCNC: 27 MMOL/L (ref 22–29)
CREAT SERPL-MCNC: 1.08 MG/DL (ref 0.76–1.27)
CRP SERPL-MCNC: 3.4 MG/DL (ref 0–0.5)
DEPRECATED RDW RBC AUTO: 42.9 FL (ref 37–54)
EGFRCR SERPLBLD CKD-EPI 2021: 70.7 ML/MIN/1.73
EOSINOPHIL # BLD AUTO: 0.19 10*3/MM3 (ref 0–0.4)
EOSINOPHIL NFR BLD AUTO: 4.3 % (ref 0.3–6.2)
ERYTHROCYTE [DISTWIDTH] IN BLOOD BY AUTOMATED COUNT: 13.6 % (ref 12.3–15.4)
ERYTHROCYTE [SEDIMENTATION RATE] IN BLOOD: 81 MM/HR (ref 0–20)
GLOBULIN UR ELPH-MCNC: 4.3 GM/DL
GLUCOSE SERPL-MCNC: 112 MG/DL (ref 65–99)
HCT VFR BLD AUTO: 33.5 % (ref 37.5–51)
HGB BLD-MCNC: 10.8 G/DL (ref 13–17.7)
IMM GRANULOCYTES # BLD AUTO: 0.01 10*3/MM3 (ref 0–0.05)
IMM GRANULOCYTES NFR BLD AUTO: 0.2 % (ref 0–0.5)
LYMPHOCYTES # BLD AUTO: 0.8 10*3/MM3 (ref 0.7–3.1)
LYMPHOCYTES NFR BLD AUTO: 18 % (ref 19.6–45.3)
MCH RBC QN AUTO: 27.9 PG (ref 26.6–33)
MCHC RBC AUTO-ENTMCNC: 32.2 G/DL (ref 31.5–35.7)
MCV RBC AUTO: 86.6 FL (ref 79–97)
MONOCYTES # BLD AUTO: 0.2 10*3/MM3 (ref 0.1–0.9)
MONOCYTES NFR BLD AUTO: 4.5 % (ref 5–12)
NEUTROPHILS NFR BLD AUTO: 3.23 10*3/MM3 (ref 1.7–7)
NEUTROPHILS NFR BLD AUTO: 72.8 % (ref 42.7–76)
NRBC BLD AUTO-RTO: 0 /100 WBC (ref 0–0.2)
PLATELET # BLD AUTO: 132 10*3/MM3 (ref 140–450)
PMV BLD AUTO: 10.1 FL (ref 6–12)
POTASSIUM SERPL-SCNC: 3.9 MMOL/L (ref 3.5–5.2)
PROT SERPL-MCNC: 7.4 G/DL (ref 6–8.5)
RBC # BLD AUTO: 3.87 10*6/MM3 (ref 4.14–5.8)
SODIUM SERPL-SCNC: 137 MMOL/L (ref 136–145)
WBC NRBC COR # BLD AUTO: 4.44 10*3/MM3 (ref 3.4–10.8)

## 2024-07-01 PROCEDURE — 85025 COMPLETE CBC W/AUTO DIFF WBC: CPT

## 2024-07-01 PROCEDURE — 85652 RBC SED RATE AUTOMATED: CPT

## 2024-07-01 PROCEDURE — 1160F RVW MEDS BY RX/DR IN RCRD: CPT | Performed by: ORTHOPAEDIC SURGERY

## 2024-07-01 PROCEDURE — 1159F MED LIST DOCD IN RCRD: CPT | Performed by: ORTHOPAEDIC SURGERY

## 2024-07-01 PROCEDURE — 80053 COMPREHEN METABOLIC PANEL: CPT

## 2024-07-01 PROCEDURE — 36415 COLL VENOUS BLD VENIPUNCTURE: CPT

## 2024-07-01 PROCEDURE — 86140 C-REACTIVE PROTEIN: CPT

## 2024-07-01 PROCEDURE — 99024 POSTOP FOLLOW-UP VISIT: CPT | Performed by: ORTHOPAEDIC SURGERY

## 2024-07-01 RX ORDER — PREDNISONE 20 MG/1
TABLET ORAL
COMMUNITY
Start: 2024-01-22

## 2024-07-01 NOTE — PROGRESS NOTES
"    [facility] [place]    Subjective     Chief Complaint   Patient presents with   • Post-op     Post-op (1 week follow-up: 3 weeks S/P Left Total Hip Incision & Drainage, Poly Exchange (05/14/2024); 6 weeks S/P Modified Anterior Left NAVNEET (4/23/24))        HPI    It has been {Numbers; 0-30:62297}  {DAYS, WEEKS, MONTHS, YEARS:21153} since Mr. Salinas's last visit. He returns to clinic today for {Alex new/established:07228} {Right/left:04334} {Alex body part:72755} {Alex Reason:81397}. The issue has been ongoing for {Numbers; 0-30:41532} {DAYS, WEEKS, MONTHS, YEARS:22024}. He rates his pain a {0-10:75555}/10 on the pain scale. Previous/current treatments: {Previous Tx:42706}. Current symptoms: {Alex Symptoms:06751}. The pain is worse with {Movement:48783}; {Home Tx:99061} improve the pain. Overall, he is doing {better worse same:94990}. ***     I have reviewed the following portions of the patient's history and agree with: {Ramakrishna HPI and ROS:21065::\"History of Present Illness\",\"Review of Systems\"}    Patient Active Problem List   Diagnosis   • Lumbosacral spondylosis without myelopathy   • Generalized osteoarthrosis, involving multiple sites   • Gout   • Esophageal reflux   • Hypertrophy of prostate without urinary obstruction   • Tobacco abuse   • Degenerative arthritis of hip   • HTN (hypertension)   • Obesity (BMI 30-39.9)   • Status post total replacement of left hip 4/23/24   • Left hip prosthetic joint infection   • Seroma of musculoskeletal structure after musculoskeletal system procedure   • Status post total hip replacement, left   • S/P debridement left hip wound with head and liner exchange     Past Medical History:   Diagnosis Date   • Arthritis    • Edema     Lower extremities   • GERD (gastroesophageal reflux disease)    • History of transfusion    • Hypertension    • Insomnia    • Plantar fascial fibromatosis    • Tibia fracture     Left      Past Surgical History: "   Procedure Laterality Date   • CATARACT EXTRACTION Bilateral    • COLONOSCOPY     • INCISION AND DRAINAGE HIP Left 5/14/2024    Procedure: TOTAL HIP INCISION AND DRAINAGE, POLY EXCHANGE - LEFT;  Surgeon: Sumanth Durbin MD;  Location:  [facility];  Service: Orthopedics;  Laterality: Left;   • TOTAL HIP ARTHROPLASTY Left 4/23/2024    Procedure: TOTAL HIP ARTHROPLASTY ANTERIOR MODIFIED LEFT;  Surgeon: Sumanth Durbin MD;  Location:  [facility];  Service: Orthopedics;  Laterality: Left;   • VEIN SURGERY Left     Vein Stripping      Family History   Problem Relation Age of Onset   • Cancer Mother    • Cancer Father      Social History     Socioeconomic History   • Marital status:    Tobacco Use   • Smoking status: Former     Types: Cigarettes     Passive exposure: Past   • Smokeless tobacco: Former     Types: Chew   Vaping Use   • Vaping status: Never Used   Substance and Sexual Activity   • Alcohol use: Yes     Comment: occasional   • Drug use: No   • Sexual activity: Defer      Current Outpatient Medications on File Prior to Visit   Medication Sig Dispense Refill   • allopurinol (ZYLOPRIM) 300 MG tablet TAKE 1 TABLET BY MOUTH EVERY DAY 90 tablet 3   • amitriptyline (ELAVIL) 25 MG tablet TAKE 1 TABLET BY MOUTH EVERY DAY AT NIGHT 90 tablet 3   • aspirin (ASPIR) 81 MG EC tablet Take 1 tablet by mouth 2 (Two) Times a Day. 60 tablet 0   • doxazosin (CARDURA) 4 MG tablet Take 1 tablet by mouth every night at bedtime. 90 tablet 3   • gabapentin (NEURONTIN) 300 MG capsule TAKE 1 CAPSULE BY MOUTH EVERY NIGHT AT BEDTIME. 90 capsule 1   • linezolid (ZYVOX) 600 MG tablet Take 1 tablet by mouth 2 (Two) Times a Day. 20 tablet 0   • meclizine (ANTIVERT) 25 MG tablet Take 1 tablet by mouth 3 (Three) Times a Day As Needed for Dizziness. 15 tablet 0   • mesalamine (LIALDA) 1.2 g EC tablet Take 1 tablet by mouth Every 12 (Twelve) Hours.     • metoprolol succinate XL (TOPROL-XL) 100 MG 24 hr tablet TAKE 1 TABLET BY MOUTH EVERY DAY  90 tablet 3   • omeprazole (priLOSEC) 40 MG capsule Take 1 capsule by mouth Daily. 90 capsule 3   • predniSONE (DELTASONE) 20 MG tablet PREDNISONE 20 MG TABS     • temazepam (RESTORIL) 15 MG capsule TAKE 1 CAPSULE BY MOUTH AT NIGHT AS NEEDED FOR SLEEP. 30 capsule 5   • valsartan (DIOVAN) 160 MG tablet Take 1 tablet by mouth Daily. 90 tablet 3     No current facility-administered medications on file prior to visit.      Allergies   Allergen Reactions   • Nsaids GI Bleeding     colitis   • Penicillins Hives     Fever spiked and out for 7days        Review of Systems   Constitutional:  Negative for activity change, appetite change, chills, diaphoresis, fatigue, fever and unexpected weight change.   HENT:  Negative for congestion, dental problem, drooling, ear discharge, ear pain, facial swelling, hearing loss, mouth sores, nosebleeds, postnasal drip, rhinorrhea, sinus pressure, sneezing, sore throat, tinnitus, trouble swallowing and voice change.    Eyes:  Negative for photophobia, pain, discharge, redness, itching and visual disturbance.   Respiratory:  Negative for apnea, cough, choking, chest tightness, shortness of breath, wheezing and stridor.    Cardiovascular:  Negative for chest pain, palpitations and leg swelling.   Gastrointestinal:  Negative for abdominal distention, abdominal pain, anal bleeding, blood in stool, constipation, diarrhea, nausea, rectal pain and vomiting.   Endocrine: Negative for cold intolerance, heat intolerance, polydipsia, polyphagia and polyuria.   Genitourinary:  Negative for decreased urine volume, difficulty urinating, dysuria, enuresis, flank pain, frequency, genital sores, hematuria and urgency.   Musculoskeletal:  Positive for arthralgias. Negative for back pain, gait problem, joint swelling, myalgias, neck pain and neck stiffness.   Skin:  Negative for color change, pallor, rash and wound.   Allergic/Immunologic: Negative for environmental allergies, food allergies and  immunocompromised state.   Neurological:  Negative for dizziness, tremors, seizures, syncope, facial asymmetry, speech difficulty, weakness, light-headedness, numbness and headaches.   Hematological:  Negative for adenopathy. Does not bruise/bleed easily.   Psychiatric/Behavioral:  Negative for agitation, behavioral problems, confusion, decreased concentration, dysphoric mood, hallucinations, self-injury, sleep disturbance and suicidal ideas. The patient is not nervous/anxious and is not hyperactive.       Objective      Physical Exam  There were no vitals taken for this visit.    There is no height or weight on file to calculate BMI.         General:   Mental Status:  Alert   Appearance: Cooperative, in no acute distress   Build and Nutrition: *** male   Orientation: Alert and oriented to person, place and time   Posture: Normal   Gait: ***    ***    Imaging/Studies  Imaging Results (Last 24 Hours)       ** No results found for the last 24 hours. **              Assessment and Plan     There are no diagnoses linked to this encounter.    No diagnosis found.    ***    No follow-ups on file.      Sumanth Durbin MD  07/01/24  08:51 EDT    Dictated Utilizing [employer]

## 2024-07-01 NOTE — PROGRESS NOTES
[facility]opaedic [facility] Clinic Note    Subjective     Chief Complaint   Patient presents with    Post-op     Post-op (3 week follow-up:  6 weeks S/P Left Total Hip Incision & Drainage, Poly Exchange (05/14/2024) 11 weeks S/P Modified Anterior Left NAVNEET (4/23/24)        HPI    It has been 3  week(s) since Mr. Salinas's last visit. He returns to clinic today for postoperative follow-up of left hip pain. The issue has been ongoing for 5 month(s). He rates his pain a 0/10 on the pain scale. Previous/current treatments: NSAIDS.  100% improvement compared to his preoperative symptoms.  Completing his IV antibiotic regimen, and transitioning to oral antibiotics afterwards.    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    Status post total hip replacement, left    S/P debridement left hip wound with head and liner exchange     Past Medical History:   Diagnosis Date    Arthritis     Edema     Lower extremities    GERD (gastroesophageal reflux disease)     History of transfusion     Hypertension     Insomnia     Plantar fascial fibromatosis     Tibia fracture     Left      Past Surgical History:   Procedure Laterality Date    CATARACT EXTRACTION Bilateral     COLONOSCOPY      INCISION AND DRAINAGE HIP Left 5/14/2024    Procedure: TOTAL HIP INCISION AND DRAINAGE, POLY EXCHANGE - LEFT;  Surgeon: Sumanth Durbin MD;  Location: [facility];  Service:  Orthopedics;  Laterality: Left;    TOTAL HIP ARTHROPLASTY Left 4/23/2024    Procedure: TOTAL HIP ARTHROPLASTY ANTERIOR MODIFIED LEFT;  Surgeon: Sumanth Durbin MD;  Location: [facility];  Service: Orthopedics;  Laterality: Left;    VEIN SURGERY Left     Vein Stripping      Family History   Problem Relation Age of Onset    Cancer Mother     Cancer Father      Social History     Socioeconomic History    Marital status:    Tobacco Use    Smoking status: Former     Types: Cigarettes     Passive exposure: Past    Smokeless tobacco: Former     Types: Chew   Vaping Use    Vaping status: Never Used   Substance and Sexual Activity    Alcohol use: Yes     Comment: occasional    Drug use: No    Sexual activity: Defer      Current Outpatient Medications on File Prior to Visit   Medication Sig Dispense Refill    allopurinol (ZYLOPRIM) 300 MG tablet TAKE 1 TABLET BY MOUTH EVERY DAY 90 tablet 3    amitriptyline (ELAVIL) 25 MG tablet TAKE 1 TABLET BY MOUTH EVERY DAY AT NIGHT 90 tablet 3    aspirin (ASPIR) 81 MG EC tablet Take 1 tablet by mouth 2 (Two) Times a Day. 60 tablet 0    doxazosin (CARDURA) 4 MG tablet Take 1 tablet by mouth every night at bedtime. 90 tablet 3    gabapentin (NEURONTIN) 300 MG capsule TAKE 1 CAPSULE BY MOUTH EVERY NIGHT AT BEDTIME. 90 capsule 1    linezolid (ZYVOX) 600 MG tablet Take 1 tablet by mouth 2 (Two) Times a Day. 20 tablet 0    meclizine (ANTIVERT) 25 MG tablet Take 1 tablet by mouth 3 (Three) Times a Day As Needed for Dizziness. 15 tablet 0    mesalamine (LIALDA) 1.2 g EC tablet Take 1 tablet by mouth Every 12 (Twelve) Hours.      metoprolol succinate XL (TOPROL-XL) 100 MG 24 hr tablet TAKE 1 TABLET BY MOUTH EVERY DAY 90 tablet 3    omeprazole (priLOSEC) 40 MG capsule Take 1 capsule by mouth Daily. 90 capsule 3    predniSONE (DELTASONE) 20 MG tablet PREDNISONE 20 MG TABS      temazepam (RESTORIL) 15 MG capsule TAKE 1 CAPSULE BY MOUTH AT NIGHT AS NEEDED FOR SLEEP. 30 capsule 5    valsartan  (DIOVAN) 160 MG tablet Take 1 tablet by mouth Daily. 90 tablet 3     No current facility-administered medications on file prior to visit.      Allergies   Allergen Reactions    Nsaids GI Bleeding     colitis    Penicillins Hives     Fever spiked and out for 7days        Review of Systems   Constitutional:  Negative for activity change, appetite change, chills, diaphoresis, fatigue, fever and unexpected weight change.   HENT:  Negative for congestion, dental problem, drooling, ear discharge, ear pain, facial swelling, hearing loss, mouth sores, nosebleeds, postnasal drip, rhinorrhea, sinus pressure, sneezing, sore throat, tinnitus, trouble swallowing and voice change.    Eyes:  Negative for photophobia, pain, discharge, redness, itching and visual disturbance.   Respiratory:  Negative for apnea, cough, choking, chest tightness, shortness of breath, wheezing and stridor.    Cardiovascular:  Negative for chest pain, palpitations and leg swelling.   Gastrointestinal:  Negative for abdominal distention, abdominal pain, anal bleeding, blood in stool, constipation, diarrhea, nausea, rectal pain and vomiting.   Endocrine: Negative for cold intolerance, heat intolerance, polydipsia, polyphagia and polyuria.   Genitourinary:  Negative for decreased urine volume, difficulty urinating, dysuria, enuresis, flank pain, frequency, genital sores, hematuria and urgency.   Musculoskeletal:  Positive for arthralgias. Negative for back pain, gait problem, joint swelling, myalgias, neck pain and neck stiffness.   Skin:  Negative for color change, pallor, rash and wound.   Allergic/Immunologic: Negative for environmental allergies, food allergies and immunocompromised state.   Neurological:  Negative for dizziness, tremors, seizures, syncope, facial asymmetry, speech difficulty, weakness, light-headedness, numbness and headaches.   Hematological:  Negative for adenopathy. Does not bruise/bleed easily.   Psychiatric/Behavioral:  Negative  for agitation, behavioral problems, confusion, decreased concentration, dysphoric mood, hallucinations, self-injury, sleep disturbance and suicidal ideas. The patient is not nervous/anxious and is not hyperactive.         Objective      Physical Exam  There were no vitals taken for this visit.    There is no height or weight on file to calculate BMI.         General:   Mental Status:  Alert   Appearance: Cooperative, in no acute distress   Build and Nutrition: Well-nourished well-developed male   Orientation: Alert and oriented to person, place and time   Posture: Normal   Gait: Nonantalgic    Integument:   Left hip: Wound is well-healed with no signs of infection    Lower Extremity:   Left Hip:    Tenderness:  None    Swelling:  None    Crepitus:  None    Range of motion:  External Rotation: 30°       Internal Rotation: 30°       Flexion:  100°       Extension:  0°    Deformities:  None  Functional testing: Negative Stinchfield    No leg length discrepancy        Imaging/Studies  Imaging Results (Last 24 Hours)       ** No results found for the last 24 hours. **          No new imaging today.    Assessment and Plan     Diagnoses and all orders for this visit:    1. Status post total hip replacement, left (Primary)    2. Orthopedic aftercare        1. Status post total hip replacement, left    2. Orthopedic aftercare        I reviewed my findings with the patient.  His left total hip arthroplasty appears to be functioning well.  He is completing his IV antibiotic regimen, and is transitioning to orals. I will see him back in 4 months, which will be a 6-month checkup postoperatively, but I will be happy to see him back sooner for any problems.    Return in about 4 months (around 11/1/2024) for recheck with x-rays.      Sumanth Durbin MD  07/01/24  09:47 EDT    Dictated Utilizing Dragon Dictation

## 2024-07-02 ENCOUNTER — TREATMENT (OUTPATIENT)
Dept: PHYSICAL THERAPY | Facility: CLINIC | Age: 79
End: 2024-07-02
Payer: MEDICARE

## 2024-07-02 DIAGNOSIS — R29.898 HIP WEAKNESS: ICD-10-CM

## 2024-07-02 DIAGNOSIS — Z74.09 IMPAIRED FUNCTIONAL MOBILITY, BALANCE, GAIT, AND ENDURANCE: ICD-10-CM

## 2024-07-02 DIAGNOSIS — Z96.642 STATUS POST TOTAL HIP REPLACEMENT, LEFT: Primary | ICD-10-CM

## 2024-07-02 NOTE — PROGRESS NOTES
Physical Therapy Daily Treatment Note                  [address] [facility]  [place] [address]      Patient: Papito Salinas   : 1945  Diagnosis/ICD-10 Code:  Status post total hip replacement, left [Z96.642]  Referring practitioner: RANDALL Stoner*  Date of Initial Visit: Type: THERAPY  Noted: 2024  Today's Date: 2024  Patient seen for 10 sessions             Subjective   Papito Salinas reports: still needing to help the leg in and out of the car because it still feels a little weak there.     Objective          Active Range of Motion   Left Hip   Flexion: 94 degrees     Passive Range of Motion   Left Hip   Flexion: 113 degrees       See Exercise, Manual, and Modality Logs for complete treatment.       Assessment/Plan  Pt still needs some cues to improve activation and the L glute during bridges as he tends to lag on the affected side. Did improve hip PROM with stretching followed by seated marches against gravity with weight. Pt unable to complete SLR after completing all the new hip flexion exercises due to fatigue and completed QS instead. Progressed to resisted side stepping side to side at TM bar to improve functional hip strength.  Progress per Plan of Care and Progress strengthening /stabilization /functional activity           Timed:  Manual Therapy:         mins  42006;  Therapeutic Exercise:    30     mins  01968;     Neuromuscular Ean:    15    mins  22053;    Therapeutic Activity:     8     mins  72004;     Gait Training:           mins  46361;     Ultrasound:          mins  43330;    Electrical Stimulation:         mins  97581;  Iontophoresis          mins  10928    Untimed:  Electrical Stimulation:         mins  30724 ( );  Mechanical Traction:         mins  40123;   Fluidotherapy          mins  47316    Timed Treatment:   53   mins   Total Treatment:     53   mins        Ellyn Harrison PTA  Physical Therapist Assistant

## 2024-07-08 ENCOUNTER — TRANSCRIBE ORDERS (OUTPATIENT)
Dept: LAB | Facility: HOSPITAL | Age: 79
End: 2024-07-08
Payer: MEDICARE

## 2024-07-08 ENCOUNTER — LAB (OUTPATIENT)
Dept: LAB | Facility: HOSPITAL | Age: 79
End: 2024-07-08
Payer: MEDICARE

## 2024-07-08 ENCOUNTER — TREATMENT (OUTPATIENT)
Dept: PHYSICAL THERAPY | Facility: CLINIC | Age: 79
End: 2024-07-08
Payer: MEDICARE

## 2024-07-08 DIAGNOSIS — K83.09 BACTERIAL CHOLANGITIS: Primary | ICD-10-CM

## 2024-07-08 DIAGNOSIS — L03.116 CELLULITIS OF LEFT FOOT: ICD-10-CM

## 2024-07-08 DIAGNOSIS — B96.89 BACTERIAL CHOLANGITIS: ICD-10-CM

## 2024-07-08 DIAGNOSIS — K83.09 BACTERIAL CHOLANGITIS: ICD-10-CM

## 2024-07-08 DIAGNOSIS — T84.52XD INFECTION ASSOCIATED WITH INTERNAL LEFT HIP PROSTHESIS, SUBSEQUENT ENCOUNTER: ICD-10-CM

## 2024-07-08 DIAGNOSIS — Z96.642 STATUS POST TOTAL HIP REPLACEMENT, LEFT: Primary | ICD-10-CM

## 2024-07-08 DIAGNOSIS — B96.89 BACTERIAL CHOLANGITIS: Primary | ICD-10-CM

## 2024-07-08 DIAGNOSIS — Z74.09 IMPAIRED FUNCTIONAL MOBILITY, BALANCE, GAIT, AND ENDURANCE: ICD-10-CM

## 2024-07-08 DIAGNOSIS — R29.898 HIP WEAKNESS: ICD-10-CM

## 2024-07-08 LAB
ALBUMIN SERPL-MCNC: 3.1 G/DL (ref 3.5–5.2)
ALBUMIN/GLOB SERPL: 0.7 G/DL
ALP SERPL-CCNC: 182 U/L (ref 39–117)
ALT SERPL W P-5'-P-CCNC: 19 U/L (ref 1–41)
ANION GAP SERPL CALCULATED.3IONS-SCNC: 8 MMOL/L (ref 5–15)
AST SERPL-CCNC: 26 U/L (ref 1–40)
BASOPHILS # BLD AUTO: 0.03 10*3/MM3 (ref 0–0.2)
BASOPHILS NFR BLD AUTO: 0.5 % (ref 0–1.5)
BILIRUB SERPL-MCNC: 0.3 MG/DL (ref 0–1.2)
BUN SERPL-MCNC: 11 MG/DL (ref 8–23)
BUN/CREAT SERPL: 11 (ref 7–25)
CALCIUM SPEC-SCNC: 8.3 MG/DL (ref 8.6–10.5)
CHLORIDE SERPL-SCNC: 102 MMOL/L (ref 98–107)
CO2 SERPL-SCNC: 30 MMOL/L (ref 22–29)
CREAT SERPL-MCNC: 1 MG/DL (ref 0.76–1.27)
CRP SERPL-MCNC: 1.23 MG/DL (ref 0–0.5)
DEPRECATED RDW RBC AUTO: 41.1 FL (ref 37–54)
EGFRCR SERPLBLD CKD-EPI 2021: 77.5 ML/MIN/1.73
EOSINOPHIL # BLD AUTO: 0.31 10*3/MM3 (ref 0–0.4)
EOSINOPHIL NFR BLD AUTO: 5.6 % (ref 0.3–6.2)
ERYTHROCYTE [DISTWIDTH] IN BLOOD BY AUTOMATED COUNT: 13.3 % (ref 12.3–15.4)
ERYTHROCYTE [SEDIMENTATION RATE] IN BLOOD: 50 MM/HR (ref 0–20)
GLOBULIN UR ELPH-MCNC: 4.4 GM/DL
GLUCOSE SERPL-MCNC: 99 MG/DL (ref 65–99)
HCT VFR BLD AUTO: 32.5 % (ref 37.5–51)
HGB BLD-MCNC: 10.4 G/DL (ref 13–17.7)
IMM GRANULOCYTES # BLD AUTO: 0.08 10*3/MM3 (ref 0–0.05)
IMM GRANULOCYTES NFR BLD AUTO: 1.5 % (ref 0–0.5)
LYMPHOCYTES # BLD AUTO: 1.15 10*3/MM3 (ref 0.7–3.1)
LYMPHOCYTES NFR BLD AUTO: 20.9 % (ref 19.6–45.3)
MCH RBC QN AUTO: 27.2 PG (ref 26.6–33)
MCHC RBC AUTO-ENTMCNC: 32 G/DL (ref 31.5–35.7)
MCV RBC AUTO: 85.1 FL (ref 79–97)
MONOCYTES # BLD AUTO: 0.47 10*3/MM3 (ref 0.1–0.9)
MONOCYTES NFR BLD AUTO: 8.5 % (ref 5–12)
NEUTROPHILS NFR BLD AUTO: 3.47 10*3/MM3 (ref 1.7–7)
NEUTROPHILS NFR BLD AUTO: 63 % (ref 42.7–76)
NRBC BLD AUTO-RTO: 0 /100 WBC (ref 0–0.2)
PLATELET # BLD AUTO: 140 10*3/MM3 (ref 140–450)
PMV BLD AUTO: 10.3 FL (ref 6–12)
POTASSIUM SERPL-SCNC: 4.4 MMOL/L (ref 3.5–5.2)
PROT SERPL-MCNC: 7.5 G/DL (ref 6–8.5)
RBC # BLD AUTO: 3.82 10*6/MM3 (ref 4.14–5.8)
SODIUM SERPL-SCNC: 140 MMOL/L (ref 136–145)
WBC NRBC COR # BLD AUTO: 5.51 10*3/MM3 (ref 3.4–10.8)

## 2024-07-08 PROCEDURE — 85652 RBC SED RATE AUTOMATED: CPT

## 2024-07-08 PROCEDURE — 86140 C-REACTIVE PROTEIN: CPT

## 2024-07-08 PROCEDURE — 85025 COMPLETE CBC W/AUTO DIFF WBC: CPT

## 2024-07-08 PROCEDURE — 36415 COLL VENOUS BLD VENIPUNCTURE: CPT

## 2024-07-08 PROCEDURE — 80053 COMPREHEN METABOLIC PANEL: CPT

## 2024-07-08 NOTE — PROGRESS NOTES
Physical Therapy Daily Treatment Note                  [address] [facility] [address]  [place] [address]      Patient: Papito Salinas   : 1945  Diagnosis/ICD-10 Code:  Status post total hip replacement, left [Z96.642]  Referring practitioner: RANDALL Stoner*  Date of Initial Visit: Type: THERAPY  Noted: 2024  Today's Date: 2024  Patient seen for 11 sessions             Subjective   Papito Salinas reports: started using the cane and doing pretty good with it just feels like he needs to go slow for balance. Not taking any pain medicine for a while now and started driving again.    0/10 pain this week in the left hip    Objective   GAIT OBSERVATION: Slow soraya with straight cane AD in the right hand. Height is appropriate with cane. No antalgic gait pattern noted and taking normal strides step through gait pattern.   See Exercise, Manual, and Modality Logs for complete treatment.       Assessment/Plan  Looks safe with straight cane AD. Progressed HEP now that we are over 6 weeks to stretch the hip more now that there are no precautions. Pt noted the rotational stretching felt good in the left hip. Sit to stand is normalizing.     Access Code: [email or web address]  URL: [email or web address]  Date: 2024  Prepared by: Ellyn Harrison    Exercises  - Supine Lower Trunk Rotation  - 2 x daily - 7 x weekly - 1 sets - 10 reps - 5 sec hold  - Seated Hamstring Stretch with Strap  - 2 x daily - 7 x weekly - 3 sets - 15 sec hold  - Seated Long Arc Quad (Mirrored)  - 1 x daily - 7 x weekly - 2 sets - 10 reps  - Standing Hip Abduction  - 1 x daily - 7 x weekly - 2 sets - 10 reps  - Sit to Stand  - 1 x daily - 7 x weekly - 2 sets - 10 reps  - Bridge  - 1 x daily - 7 x weekly - 2 sets - 10 reps  Progress per Plan of Care and Progress strengthening /stabilization /functional activity           Timed:  Manual Therapy:         mins  16561;  Therapeutic Exercise:    28      mins  36092;     Neuromuscular Ean:    15    mins  47132;    Therapeutic Activity:     10     mins  32170;     Gait Training:           mins  38230;     Ultrasound:          mins  50944;    Electrical Stimulation:         mins  61206;  Iontophoresis          mins  95834    Untimed:  Electrical Stimulation:         mins  28968 ( );  Mechanical Traction:         mins  50507;   Fluidotherapy          mins  26118    Timed Treatment:   53   mins   Total Treatment:     53   mins        Ellyn Harrison PTA  Physical Therapist Assistant

## 2024-07-11 ENCOUNTER — TREATMENT (OUTPATIENT)
Dept: PHYSICAL THERAPY | Facility: CLINIC | Age: 79
End: 2024-07-11
Payer: MEDICARE

## 2024-07-11 DIAGNOSIS — Z96.642 STATUS POST TOTAL HIP REPLACEMENT, LEFT: Primary | ICD-10-CM

## 2024-07-11 NOTE — PROGRESS NOTES
Physical Therapy Daily Treatment Note                  [address] [facility] [address]  [place] [address]      Patient: Papito Salinas   : 1945  Diagnosis/ICD-10 Code:  Status post total hip replacement, left [Z96.642]  Referring practitioner: RANDALL Stoner*  Date of Initial Visit: Type: THERAPY  Noted: 2024  Today's Date: 2024  Patient seen for 12 sessions             Subjective   Papito Salinas reports: doing well. Pt came to therapy with no AD. Have not been completing exercises at home.     L hip-  0/10 pain    Objective   OBSERVATION: hips shift to the right when completing squat  See Exercise, Manual, and Modality Logs for complete treatment.       Assessment/Plan  Encouraged pt to complete HEP to continue building strength daily and mobility daily. Pt noted slight discomfort in right knee during squats. Able to abolish with cues of shifting hips slightly left to midline as he was favoring the unaffected side. He was able to progress to 6 inch step up with no compensations or hip drop on the left side.   Progress per Plan of Care and Progress strengthening /stabilization /functional activity           Timed:  Manual Therapy:         mins  61843;  Therapeutic Exercise:    18     mins  37931;     Neuromuscular Ean:    15    mins  69171;    Therapeutic Activity:     8     mins  62366;     Gait Training:           mins  74341;     Ultrasound:          mins  70765;    Electrical Stimulation:         mins  28920;  Iontophoresis          mins  29240    Untimed:  Electrical Stimulation:         mins  86377 ( );  Mechanical Traction:         mins  41369;   Fluidotherapy          mins  53443    Timed Treatment:   41   mins   Total Treatment:     41   mins        Ellyn Harrison PTA  Physical Therapist Assistant

## 2024-07-15 ENCOUNTER — TREATMENT (OUTPATIENT)
Dept: PHYSICAL THERAPY | Facility: CLINIC | Age: 79
End: 2024-07-15
Payer: MEDICARE

## 2024-07-15 DIAGNOSIS — Z96.642 STATUS POST TOTAL HIP REPLACEMENT, LEFT: Primary | ICD-10-CM

## 2024-07-15 DIAGNOSIS — Z47.89 ORTHOPEDIC AFTERCARE: ICD-10-CM

## 2024-07-15 NOTE — PROGRESS NOTES
"Physical Therapy Daily Treatment Note / Re-assessment  [facility]    Patient: Papito Salinas   : 1945  Referring practitioner: RANDALL Stoner*  Date of Initial Visit: Type: THERAPY  Noted: 2024  Today's Date: 7/15/2024  Patient seen for 13 sessions       Visit Diagnoses:    ICD-10-CM ICD-9-CM   1. Status post total hip replacement, left  Z96.642 V43.64   2. Orthopedic aftercare  Z47.89 V54.9       Visit #: 13    Subjective   Feeling a little unsteady today, using cane  Cannot walk very far   0/10 hip pain     Objective   See Exercise, Manual, and Modality Logs for complete treatment    35/80 LEFS score  Left hip flexion AROM 115 degrees (supine), ER 35 deg    MMT: left hip ER 4+/5 , hip abd 3/5    30\" STS test=7 reps    Assessment/Plan  Patient progressing well, met LEFS score goal; needs continued strength of left hip for improved ADL's and prolonged walking ; continue to advance CKC strength, balance and load tolerance for safe execution of ADL's and to decrease fall risk    Goals  Plan Goals: STG 4 wks  1) Pt to be compliant w/ initial HEP for ROM, strength, and symptom mgmt.-MET  2) Pt to report pn at rest and w/ household ambulation to be no greater than 5/10.-MET  3) Pt to improve L hip strength to 3+/5 or better in all planes.-PARTIALLY MET  4) Pt to improve L hip AROM to 80 deg flxn, 15 deg ER or better to assist w/ lower body hygiene/dressing.-PARTIALLY MET  5) Pt to improve LEFS score to 30/80 or better to reflect improved pn and function.-MET  6) Pt to progress to ambulation w/ SPC w/ appropriate gait mechanics.-ONGOING     LTG 8 wks  1) Pt to be independent w/ long term HEP and self mgmt.-ONGOING  2) Pt to progress to independent ambulation over household and community distances w/ appropriate gait mechanics.-ONGOING  3) Pt to improve L hip strength to 4+/5 or better in all planes.-PROGRESSING  4) Pt to improve L hip ROM to 100 deg flxn, 25 deg ER or better to assist " "w/ lower body dressing/hygiene- MET  5) Pt to improve LEFS score to 45/80 or better to reflect improved pn and function.-PROGRESSING  6) Pt to maintain SLS on LLE x10 sec or greater to reflect improved strength/stability.-ONGOING    Additional goal: patient to perform > 10 reps of 30\" STS test     Treatment considerations for next visit:  Continue per POC for improved LE strength, endurance, balance, mobility     Timed:   Manual Therapy:         mins  60649;     Therapeutic Exercise:    20     mins  16359;     Neuromuscular Ean:    15    mins  89100;    Therapeutic Activity:     30     mins  20124;     Gait Training:      10     mins  35106;     Ultrasound:          mins  47237;    Ionto                                   mins   81757  Self Care                            mins   10057  Canalith Repos         mins   93251    Un-Timed:  Electrical Stimulation:         mins  58671 ( );  Dry Needling          mins self-pay  Traction          mins 74781      Timed Treatment:   75   mins   Total Treatment:     75   mins    JORDIN Hendrickson, PT  [place] License: [id number]                  "

## 2024-07-17 ENCOUNTER — LAB (OUTPATIENT)
Dept: LAB | Facility: HOSPITAL | Age: 79
End: 2024-07-17
Payer: MEDICARE

## 2024-07-17 ENCOUNTER — TRANSCRIBE ORDERS (OUTPATIENT)
Dept: LAB | Facility: HOSPITAL | Age: 79
End: 2024-07-17
Payer: MEDICARE

## 2024-07-17 DIAGNOSIS — B96.89 BACTERIAL CHOLANGITIS: ICD-10-CM

## 2024-07-17 DIAGNOSIS — T84.52XD INFECTION ASSOCIATED WITH INTERNAL LEFT HIP PROSTHESIS, SUBSEQUENT ENCOUNTER: ICD-10-CM

## 2024-07-17 DIAGNOSIS — K83.09 BACTERIAL CHOLANGITIS: ICD-10-CM

## 2024-07-17 DIAGNOSIS — L03.116 CELLULITIS OF LEFT FOOT: ICD-10-CM

## 2024-07-17 DIAGNOSIS — T84.52XD INFECTION ASSOCIATED WITH INTERNAL LEFT HIP PROSTHESIS, SUBSEQUENT ENCOUNTER: Primary | ICD-10-CM

## 2024-07-17 LAB
ALBUMIN SERPL-MCNC: 3.4 G/DL (ref 3.5–5.2)
ALBUMIN/GLOB SERPL: 0.9 G/DL
ALP SERPL-CCNC: 156 U/L (ref 39–117)
ALT SERPL W P-5'-P-CCNC: 17 U/L (ref 1–41)
ANION GAP SERPL CALCULATED.3IONS-SCNC: 9 MMOL/L (ref 5–15)
AST SERPL-CCNC: 25 U/L (ref 1–40)
BASOPHILS # BLD AUTO: 0.03 10*3/MM3 (ref 0–0.2)
BASOPHILS NFR BLD AUTO: 0.6 % (ref 0–1.5)
BILIRUB SERPL-MCNC: 0.3 MG/DL (ref 0–1.2)
BUN SERPL-MCNC: 11 MG/DL (ref 8–23)
BUN/CREAT SERPL: 8.5 (ref 7–25)
CALCIUM SPEC-SCNC: 8.4 MG/DL (ref 8.6–10.5)
CHLORIDE SERPL-SCNC: 101 MMOL/L (ref 98–107)
CO2 SERPL-SCNC: 25 MMOL/L (ref 22–29)
CREAT SERPL-MCNC: 1.29 MG/DL (ref 0.76–1.27)
CRP SERPL-MCNC: 0.84 MG/DL (ref 0–0.5)
DEPRECATED RDW RBC AUTO: 46.9 FL (ref 37–54)
EGFRCR SERPLBLD CKD-EPI 2021: 57.1 ML/MIN/1.73
EOSINOPHIL # BLD AUTO: 0.19 10*3/MM3 (ref 0–0.4)
EOSINOPHIL NFR BLD AUTO: 4 % (ref 0.3–6.2)
ERYTHROCYTE [DISTWIDTH] IN BLOOD BY AUTOMATED COUNT: 15.5 % (ref 12.3–15.4)
ERYTHROCYTE [SEDIMENTATION RATE] IN BLOOD: 48 MM/HR (ref 0–20)
GLOBULIN UR ELPH-MCNC: 3.8 GM/DL
GLUCOSE SERPL-MCNC: 126 MG/DL (ref 65–99)
HCT VFR BLD AUTO: 33.9 % (ref 37.5–51)
HGB BLD-MCNC: 10.8 G/DL (ref 13–17.7)
IMM GRANULOCYTES # BLD AUTO: 0.01 10*3/MM3 (ref 0–0.05)
IMM GRANULOCYTES NFR BLD AUTO: 0.2 % (ref 0–0.5)
LYMPHOCYTES # BLD AUTO: 0.61 10*3/MM3 (ref 0.7–3.1)
LYMPHOCYTES NFR BLD AUTO: 13 % (ref 19.6–45.3)
MCH RBC QN AUTO: 28.1 PG (ref 26.6–33)
MCHC RBC AUTO-ENTMCNC: 31.9 G/DL (ref 31.5–35.7)
MCV RBC AUTO: 88.1 FL (ref 79–97)
MONOCYTES # BLD AUTO: 0.48 10*3/MM3 (ref 0.1–0.9)
MONOCYTES NFR BLD AUTO: 10.2 % (ref 5–12)
NEUTROPHILS NFR BLD AUTO: 3.39 10*3/MM3 (ref 1.7–7)
NEUTROPHILS NFR BLD AUTO: 72 % (ref 42.7–76)
NRBC BLD AUTO-RTO: 0 /100 WBC (ref 0–0.2)
PLATELET # BLD AUTO: 226 10*3/MM3 (ref 140–450)
PMV BLD AUTO: 10.3 FL (ref 6–12)
POTASSIUM SERPL-SCNC: 4.3 MMOL/L (ref 3.5–5.2)
PROT SERPL-MCNC: 7.2 G/DL (ref 6–8.5)
RBC # BLD AUTO: 3.85 10*6/MM3 (ref 4.14–5.8)
SODIUM SERPL-SCNC: 135 MMOL/L (ref 136–145)
WBC NRBC COR # BLD AUTO: 4.71 10*3/MM3 (ref 3.4–10.8)

## 2024-07-17 PROCEDURE — 86140 C-REACTIVE PROTEIN: CPT

## 2024-07-17 PROCEDURE — 36415 COLL VENOUS BLD VENIPUNCTURE: CPT

## 2024-07-17 PROCEDURE — 85652 RBC SED RATE AUTOMATED: CPT

## 2024-07-17 PROCEDURE — 80053 COMPREHEN METABOLIC PANEL: CPT

## 2024-07-17 PROCEDURE — 85025 COMPLETE CBC W/AUTO DIFF WBC: CPT

## 2024-07-22 ENCOUNTER — TREATMENT (OUTPATIENT)
Dept: PHYSICAL THERAPY | Facility: CLINIC | Age: 79
End: 2024-07-22
Payer: MEDICARE

## 2024-07-22 DIAGNOSIS — Z96.642 STATUS POST TOTAL HIP REPLACEMENT, LEFT: Primary | ICD-10-CM

## 2024-07-22 DIAGNOSIS — R29.898 HIP WEAKNESS: ICD-10-CM

## 2024-07-22 DIAGNOSIS — Z74.09 IMPAIRED FUNCTIONAL MOBILITY, BALANCE, GAIT, AND ENDURANCE: ICD-10-CM

## 2024-07-22 DIAGNOSIS — Z47.89 ORTHOPEDIC AFTERCARE: ICD-10-CM

## 2024-07-22 PROCEDURE — 97112 NEUROMUSCULAR REEDUCATION: CPT | Performed by: PHYSICAL THERAPIST

## 2024-07-22 PROCEDURE — 97530 THERAPEUTIC ACTIVITIES: CPT | Performed by: PHYSICAL THERAPIST

## 2024-07-22 PROCEDURE — 97110 THERAPEUTIC EXERCISES: CPT | Performed by: PHYSICAL THERAPIST

## 2024-07-22 RX ORDER — OMEPRAZOLE 40 MG/1
40 CAPSULE, DELAYED RELEASE ORAL DAILY
Qty: 90 CAPSULE | Refills: 3 | Status: SHIPPED | OUTPATIENT
Start: 2024-07-22

## 2024-07-22 NOTE — PROGRESS NOTES
Physical Therapy Daily Treatment Note                  [address] [facility] [address]  [place] [address]      Patient: Papito Salinas   : 1945  Diagnosis/ICD-10 Code:  Status post total hip replacement, left [Z96.642]  Referring practitioner: RANDALL Stoner*  Date of Initial Visit: Type: THERAPY  Noted: 2024  Today's Date: 2024  Patient seen for 14 sessions             Subjective   Papito Salinas reports: had a fall when going up a small embankment by his driveway with the cane. His foot caught the cane and fell on his knees. No pain just a little sore in the knees. He knows now he was parked too close to the grass which caused him to have to go on the hill. Pt described how he got up to standing.     Objective   See Exercise, Manual, and Modality Logs for complete treatment.       Assessment/Plan  Went through pointers on how to return to standing from lying on the ground. Pt acknowledged his understanding and completed the best possible transfer from fall. No issues through out treatment and will continue to work on strength and balance.   Progress per Plan of Care and Progress strengthening /stabilization /functional activity           Timed:  Manual Therapy:         mins  36418;  Therapeutic Exercise:    30     mins  67469;     Neuromuscular Ean:    15    mins  37513;    Therapeutic Activity:     8     mins  74906;     Gait Training:           mins  52607;     Ultrasound:          mins  99110;    Electrical Stimulation:         mins  04474;  Iontophoresis          mins  50792    Untimed:  Electrical Stimulation:         mins  34615 ( );  Mechanical Traction:         mins  70682;   Fluidotherapy          mins  82904    Timed Treatment:   53   mins   Total Treatment:     53   mins        Ellyn Harrison PTA  Physical Therapist Assistant

## 2024-07-23 ENCOUNTER — OFFICE VISIT (OUTPATIENT)
Dept: FAMILY MEDICINE CLINIC | Facility: CLINIC | Age: 79
End: 2024-07-23
Payer: MEDICARE

## 2024-07-23 VITALS
SYSTOLIC BLOOD PRESSURE: 166 MMHG | DIASTOLIC BLOOD PRESSURE: 88 MMHG | HEIGHT: 73 IN | HEART RATE: 62 BPM | OXYGEN SATURATION: 96 % | RESPIRATION RATE: 18 BRPM | BODY MASS INDEX: 31.14 KG/M2 | TEMPERATURE: 98.6 F | WEIGHT: 235 LBS

## 2024-07-23 DIAGNOSIS — G89.29 CHRONIC BILATERAL LOW BACK PAIN WITH RIGHT-SIDED SCIATICA: ICD-10-CM

## 2024-07-23 DIAGNOSIS — R42 DIZZINESS: ICD-10-CM

## 2024-07-23 DIAGNOSIS — M54.41 CHRONIC BILATERAL LOW BACK PAIN WITH RIGHT-SIDED SCIATICA: ICD-10-CM

## 2024-07-23 DIAGNOSIS — N40.0 HYPERTROPHY OF PROSTATE WITHOUT URINARY OBSTRUCTION: ICD-10-CM

## 2024-07-23 DIAGNOSIS — I10 ESSENTIAL HYPERTENSION: ICD-10-CM

## 2024-07-23 DIAGNOSIS — I95.1 ORTHOSTATIC HYPOTENSION: Primary | ICD-10-CM

## 2024-07-23 PROCEDURE — 3079F DIAST BP 80-89 MM HG: CPT | Performed by: FAMILY MEDICINE

## 2024-07-23 PROCEDURE — 1160F RVW MEDS BY RX/DR IN RCRD: CPT | Performed by: FAMILY MEDICINE

## 2024-07-23 PROCEDURE — 1125F AMNT PAIN NOTED PAIN PRSNT: CPT | Performed by: FAMILY MEDICINE

## 2024-07-23 PROCEDURE — 99214 OFFICE O/P EST MOD 30 MIN: CPT | Performed by: FAMILY MEDICINE

## 2024-07-23 PROCEDURE — 3077F SYST BP >= 140 MM HG: CPT | Performed by: FAMILY MEDICINE

## 2024-07-23 PROCEDURE — 1159F MED LIST DOCD IN RCRD: CPT | Performed by: FAMILY MEDICINE

## 2024-07-23 RX ORDER — COLCHICINE 0.6 MG/1
1 CAPSULE ORAL EVERY 12 HOURS SCHEDULED
COMMUNITY
Start: 2024-07-02

## 2024-07-24 ENCOUNTER — TRANSCRIBE ORDERS (OUTPATIENT)
Dept: LAB | Facility: HOSPITAL | Age: 79
End: 2024-07-24
Payer: MEDICARE

## 2024-07-24 ENCOUNTER — LAB (OUTPATIENT)
Dept: LAB | Facility: HOSPITAL | Age: 79
End: 2024-07-24
Payer: MEDICARE

## 2024-07-24 ENCOUNTER — TREATMENT (OUTPATIENT)
Dept: PHYSICAL THERAPY | Facility: CLINIC | Age: 79
End: 2024-07-24
Payer: MEDICARE

## 2024-07-24 DIAGNOSIS — Z74.09 IMPAIRED FUNCTIONAL MOBILITY, BALANCE, GAIT, AND ENDURANCE: ICD-10-CM

## 2024-07-24 DIAGNOSIS — Z96.642 STATUS POST TOTAL HIP REPLACEMENT, LEFT: Primary | ICD-10-CM

## 2024-07-24 DIAGNOSIS — T84.52XD INFECTION ASSOCIATED WITH INTERNAL LEFT HIP PROSTHESIS, SUBSEQUENT ENCOUNTER: Primary | ICD-10-CM

## 2024-07-24 DIAGNOSIS — K83.09 BACTERIAL CHOLANGITIS: ICD-10-CM

## 2024-07-24 DIAGNOSIS — R29.898 HIP WEAKNESS: ICD-10-CM

## 2024-07-24 DIAGNOSIS — B96.89 BACTERIAL CHOLANGITIS: ICD-10-CM

## 2024-07-24 DIAGNOSIS — L03.116 CELLULITIS OF LEFT FOOT: ICD-10-CM

## 2024-07-24 DIAGNOSIS — L08.89 OTHER SPECIFIED LOCAL INFECTIONS OF THE SKIN AND SUBCUTANEOUS TISSUE: ICD-10-CM

## 2024-07-24 DIAGNOSIS — Z47.89 ORTHOPEDIC AFTERCARE: ICD-10-CM

## 2024-07-24 DIAGNOSIS — L08.89 PITTED KERATOLYSIS: ICD-10-CM

## 2024-07-24 DIAGNOSIS — T84.52XD INFECTION ASSOCIATED WITH INTERNAL LEFT HIP PROSTHESIS, SUBSEQUENT ENCOUNTER: ICD-10-CM

## 2024-07-24 LAB
ALBUMIN SERPL-MCNC: 3.3 G/DL (ref 3.5–5.2)
ALBUMIN/GLOB SERPL: 0.8 G/DL
ALP SERPL-CCNC: 156 U/L (ref 39–117)
ALT SERPL W P-5'-P-CCNC: 27 U/L (ref 1–41)
ANION GAP SERPL CALCULATED.3IONS-SCNC: 9 MMOL/L (ref 5–15)
AST SERPL-CCNC: 36 U/L (ref 1–40)
BASOPHILS # BLD AUTO: 0.02 10*3/MM3 (ref 0–0.2)
BASOPHILS NFR BLD AUTO: 0.5 % (ref 0–1.5)
BILIRUB SERPL-MCNC: 0.4 MG/DL (ref 0–1.2)
BUN SERPL-MCNC: 16 MG/DL (ref 8–23)
BUN/CREAT SERPL: 12 (ref 7–25)
CALCIUM SPEC-SCNC: 8.4 MG/DL (ref 8.6–10.5)
CHLORIDE SERPL-SCNC: 102 MMOL/L (ref 98–107)
CO2 SERPL-SCNC: 21 MMOL/L (ref 22–29)
CREAT SERPL-MCNC: 1.33 MG/DL (ref 0.76–1.27)
CRP SERPL-MCNC: 2.19 MG/DL (ref 0–0.5)
DEPRECATED RDW RBC AUTO: 51.9 FL (ref 37–54)
EGFRCR SERPLBLD CKD-EPI 2021: 55.1 ML/MIN/1.73
EOSINOPHIL # BLD AUTO: 0.42 10*3/MM3 (ref 0–0.4)
EOSINOPHIL NFR BLD AUTO: 10 % (ref 0.3–6.2)
ERYTHROCYTE [DISTWIDTH] IN BLOOD BY AUTOMATED COUNT: 16.4 % (ref 12.3–15.4)
ERYTHROCYTE [SEDIMENTATION RATE] IN BLOOD: 39 MM/HR (ref 0–20)
GLOBULIN UR ELPH-MCNC: 4 GM/DL
GLUCOSE SERPL-MCNC: 116 MG/DL (ref 65–99)
HCT VFR BLD AUTO: 34.3 % (ref 37.5–51)
HGB BLD-MCNC: 10.8 G/DL (ref 13–17.7)
IMM GRANULOCYTES # BLD AUTO: 0.01 10*3/MM3 (ref 0–0.05)
IMM GRANULOCYTES NFR BLD AUTO: 0.2 % (ref 0–0.5)
LYMPHOCYTES # BLD AUTO: 0.58 10*3/MM3 (ref 0.7–3.1)
LYMPHOCYTES NFR BLD AUTO: 13.9 % (ref 19.6–45.3)
MCH RBC QN AUTO: 27.4 PG (ref 26.6–33)
MCHC RBC AUTO-ENTMCNC: 31.5 G/DL (ref 31.5–35.7)
MCV RBC AUTO: 87.1 FL (ref 79–97)
MONOCYTES # BLD AUTO: 0.27 10*3/MM3 (ref 0.1–0.9)
MONOCYTES NFR BLD AUTO: 6.5 % (ref 5–12)
NEUTROPHILS NFR BLD AUTO: 2.88 10*3/MM3 (ref 1.7–7)
NEUTROPHILS NFR BLD AUTO: 68.9 % (ref 42.7–76)
NRBC BLD AUTO-RTO: 0 /100 WBC (ref 0–0.2)
PLATELET # BLD AUTO: 166 10*3/MM3 (ref 140–450)
PMV BLD AUTO: 10.6 FL (ref 6–12)
POTASSIUM SERPL-SCNC: 4.5 MMOL/L (ref 3.5–5.2)
PROT SERPL-MCNC: 7.3 G/DL (ref 6–8.5)
RBC # BLD AUTO: 3.94 10*6/MM3 (ref 4.14–5.8)
SODIUM SERPL-SCNC: 132 MMOL/L (ref 136–145)
WBC NRBC COR # BLD AUTO: 4.18 10*3/MM3 (ref 3.4–10.8)

## 2024-07-24 PROCEDURE — 86140 C-REACTIVE PROTEIN: CPT

## 2024-07-24 PROCEDURE — 85025 COMPLETE CBC W/AUTO DIFF WBC: CPT

## 2024-07-24 PROCEDURE — 85652 RBC SED RATE AUTOMATED: CPT

## 2024-07-24 PROCEDURE — 36415 COLL VENOUS BLD VENIPUNCTURE: CPT

## 2024-07-24 PROCEDURE — 80053 COMPREHEN METABOLIC PANEL: CPT

## 2024-07-24 NOTE — PROGRESS NOTES
Physical Therapy Daily Treatment Note                  [address] [facility] [address]  [place] [address]      Patient: Papito Salinas   : 1945  Diagnosis/ICD-10 Code:  Status post total hip replacement, left [Z96.642]  Referring practitioner: RANDALL Stoner*  Date of Initial Visit: Type: THERAPY  Noted: 2024  Today's Date: 2024  Patient seen for 15 sessions             Subjective   Papito Salinas reports: noticed yesterday he is starting to get itchy all over. His wife thinks that it is the new oral antibiotic he is getting. He sees the infectious doctor tomorrow.    Objective   See Exercise, Manual, and Modality Logs for complete treatment.       Assessment/Plan  Continue to challenge left leg strength and balance. Started a modified gretchen stretch off low mat to stretch the front of the left hip more. Not flexible at first, but by the 3rd set, minimal pulling.   Progress per Plan of Care and Progress strengthening /stabilization /functional activity           Timed:  Manual Therapy:         mins  74456;  Therapeutic Exercise:    30     mins  36648;     Neuromuscular Ean:    8    mins  96499;    Therapeutic Activity:          mins  73576;     Gait Training:           mins  81145;     Ultrasound:          mins  37169;    Electrical Stimulation:         mins  72821;  Iontophoresis          mins  07718    Untimed:  Electrical Stimulation:         mins  38107 ( );  Mechanical Traction:         mins  86313;   Fluidotherapy          mins  58449    Timed Treatment:   38   mins   Total Treatment:     38   mins        Ellyn Harrison PTA  Physical Therapist Assistant

## 2024-07-25 LAB
BACTERIA FLD CULT: ABNORMAL
GRAM STN SPEC: ABNORMAL
GRAM STN SPEC: ABNORMAL

## 2024-07-29 ENCOUNTER — TRANSCRIBE ORDERS (OUTPATIENT)
Dept: LAB | Facility: HOSPITAL | Age: 79
End: 2024-07-29
Payer: MEDICARE

## 2024-07-29 ENCOUNTER — LAB (OUTPATIENT)
Dept: LAB | Facility: HOSPITAL | Age: 79
End: 2024-07-29
Payer: MEDICARE

## 2024-07-29 DIAGNOSIS — K13.0 CHEILITIS: ICD-10-CM

## 2024-07-29 DIAGNOSIS — T84.52XD INFECTION ASSOCIATED WITH INTERNAL LEFT HIP PROSTHESIS, SUBSEQUENT ENCOUNTER: ICD-10-CM

## 2024-07-29 DIAGNOSIS — I30.1 ACUTE BACTERIAL PERICARDITIS: ICD-10-CM

## 2024-07-29 DIAGNOSIS — L08.89 OTHER SPECIFIED LOCAL INFECTIONS OF THE SKIN AND SUBCUTANEOUS TISSUE: ICD-10-CM

## 2024-07-29 DIAGNOSIS — L03.116 CELLULITIS OF LEFT FOOT: ICD-10-CM

## 2024-07-29 DIAGNOSIS — M10.9 GOUT, UNSPECIFIED CAUSE, UNSPECIFIED CHRONICITY, UNSPECIFIED SITE: ICD-10-CM

## 2024-07-29 DIAGNOSIS — B96.89 ACUTE BACTERIAL PERICARDITIS: ICD-10-CM

## 2024-07-29 DIAGNOSIS — L27.0 DRUG RASH: Primary | ICD-10-CM

## 2024-07-29 DIAGNOSIS — L27.0 DRUG RASH: ICD-10-CM

## 2024-07-29 LAB
ALBUMIN SERPL-MCNC: 3.2 G/DL (ref 3.5–5.2)
ALBUMIN/GLOB SERPL: 0.8 G/DL
ALP SERPL-CCNC: 154 U/L (ref 39–117)
ALT SERPL W P-5'-P-CCNC: 42 U/L (ref 1–41)
ANION GAP SERPL CALCULATED.3IONS-SCNC: 8 MMOL/L (ref 5–15)
AST SERPL-CCNC: 49 U/L (ref 1–40)
BASOPHILS # BLD AUTO: 0.05 10*3/MM3 (ref 0–0.2)
BASOPHILS NFR BLD AUTO: 1 % (ref 0–1.5)
BILIRUB SERPL-MCNC: 0.3 MG/DL (ref 0–1.2)
BUN SERPL-MCNC: 14 MG/DL (ref 8–23)
BUN/CREAT SERPL: 12 (ref 7–25)
CALCIUM SPEC-SCNC: 8.7 MG/DL (ref 8.6–10.5)
CHLORIDE SERPL-SCNC: 105 MMOL/L (ref 98–107)
CO2 SERPL-SCNC: 24 MMOL/L (ref 22–29)
CREAT SERPL-MCNC: 1.17 MG/DL (ref 0.76–1.27)
CRP SERPL-MCNC: 0.71 MG/DL (ref 0–0.5)
DEPRECATED RDW RBC AUTO: 51.5 FL (ref 37–54)
EGFRCR SERPLBLD CKD-EPI 2021: 64.2 ML/MIN/1.73
EOSINOPHIL # BLD AUTO: 0.16 10*3/MM3 (ref 0–0.4)
EOSINOPHIL NFR BLD AUTO: 3.3 % (ref 0.3–6.2)
ERYTHROCYTE [DISTWIDTH] IN BLOOD BY AUTOMATED COUNT: 16.5 % (ref 12.3–15.4)
ERYTHROCYTE [SEDIMENTATION RATE] IN BLOOD: 36 MM/HR (ref 0–20)
GLOBULIN UR ELPH-MCNC: 3.9 GM/DL
GLUCOSE SERPL-MCNC: 121 MG/DL (ref 65–99)
HCT VFR BLD AUTO: 35.2 % (ref 37.5–51)
HGB BLD-MCNC: 11.3 G/DL (ref 13–17.7)
IMM GRANULOCYTES # BLD AUTO: 0.02 10*3/MM3 (ref 0–0.05)
IMM GRANULOCYTES NFR BLD AUTO: 0.4 % (ref 0–0.5)
LYMPHOCYTES # BLD AUTO: 0.99 10*3/MM3 (ref 0.7–3.1)
LYMPHOCYTES NFR BLD AUTO: 20.6 % (ref 19.6–45.3)
MCH RBC QN AUTO: 27.9 PG (ref 26.6–33)
MCHC RBC AUTO-ENTMCNC: 32.1 G/DL (ref 31.5–35.7)
MCV RBC AUTO: 86.9 FL (ref 79–97)
MONOCYTES # BLD AUTO: 0.31 10*3/MM3 (ref 0.1–0.9)
MONOCYTES NFR BLD AUTO: 6.5 % (ref 5–12)
NEUTROPHILS NFR BLD AUTO: 3.27 10*3/MM3 (ref 1.7–7)
NEUTROPHILS NFR BLD AUTO: 68.2 % (ref 42.7–76)
NRBC BLD AUTO-RTO: 0 /100 WBC (ref 0–0.2)
PLATELET # BLD AUTO: 196 10*3/MM3 (ref 140–450)
PMV BLD AUTO: 9.5 FL (ref 6–12)
POTASSIUM SERPL-SCNC: 4.4 MMOL/L (ref 3.5–5.2)
PROT SERPL-MCNC: 7.1 G/DL (ref 6–8.5)
RBC # BLD AUTO: 4.05 10*6/MM3 (ref 4.14–5.8)
SODIUM SERPL-SCNC: 137 MMOL/L (ref 136–145)
WBC NRBC COR # BLD AUTO: 4.8 10*3/MM3 (ref 3.4–10.8)

## 2024-07-29 PROCEDURE — 86140 C-REACTIVE PROTEIN: CPT

## 2024-07-29 PROCEDURE — 85652 RBC SED RATE AUTOMATED: CPT

## 2024-07-29 PROCEDURE — 85025 COMPLETE CBC W/AUTO DIFF WBC: CPT

## 2024-07-29 PROCEDURE — 36415 COLL VENOUS BLD VENIPUNCTURE: CPT

## 2024-07-29 PROCEDURE — 80053 COMPREHEN METABOLIC PANEL: CPT

## 2024-07-30 ENCOUNTER — TREATMENT (OUTPATIENT)
Dept: PHYSICAL THERAPY | Facility: CLINIC | Age: 79
End: 2024-07-30
Payer: MEDICARE

## 2024-07-30 DIAGNOSIS — Z96.642 STATUS POST TOTAL HIP REPLACEMENT, LEFT: Primary | ICD-10-CM

## 2024-07-30 DIAGNOSIS — Z47.89 ORTHOPEDIC AFTERCARE: ICD-10-CM

## 2024-07-30 DIAGNOSIS — Z74.09 IMPAIRED FUNCTIONAL MOBILITY, BALANCE, GAIT, AND ENDURANCE: ICD-10-CM

## 2024-07-30 DIAGNOSIS — R29.898 HIP WEAKNESS: ICD-10-CM

## 2024-07-30 NOTE — PROGRESS NOTES
Physical Therapy Daily Treatment Note  [address]   [place] [address]      Patient: Papito Salinas   : 1945  Referring practitioner: RANDALL Stoner*  Date of Initial Visit: Type: THERAPY  Noted: 2024  Today's Date: 2024  Patient seen for 16 sessions       Visit Diagnoses:    ICD-10-CM ICD-9-CM   1. Status post total hip replacement, left  Z96.642 V43.64   2. Orthopedic aftercare  Z47.89 V54.9   3. Hip weakness  R29.898 729.89   4. Impaired functional mobility, balance, gait, and endurance  Z74.09 V49.89       Subjective   Papito Salinas reports: ID physician modified antibiotic to 1x/day, itching much improved. Some dizziness when he first stands, subsides within a few seconds. Sees PCP soon, will discuss if BP meds need modified. No hip pn and feels safe walking w/o AD. Will use cane for longer walks, doesn't feel that he is very dependent on it. Both legs still feel weak.    Pre tx pn score: 0  Post tx pn score: 0    Treatment  See Exercise, Manual, and Modality Logs for complete treatment.     Objective         Assessment & Plan       Assessment  Assessment details: Occasionally tends to vault off R leg to ascend step when leading w/ LLE, doesn't achieve full extn knee until R foot on step to assist. Otherwise performs exercises w/ good technique and no reported pn. Needs ongoing PT to further improve BLE strength, balance, endurance to maximize safety and independence w/ functional tasks.    Plan  Plan details: Continue w/ strengthening/balance/stabilization          Timed:         Manual Therapy:    0     mins  32138;     Therapeutic Exercise:    12     mins  59167;     Neuromuscular Ean:    10    mins  14232;    Therapeutic Activity:     25     mins  34283;     Gait Trainin     mins  81200;     Ultrasound:     0     mins  56114;    Ionto                               0    mins   38208  Self Care                       0     mins   97517  Gabe  Repos    0     mins 92264      Un-Timed:  Electrical Stimulation:    0     mins  69901 ( );  Dry Needling     0     mins self-pay  Traction     0     mins 04369      Timed Treatment:   47   mins   Total Treatment:     47   mins    Ellyn Tran, PT  [place] License: #[id number]

## 2024-08-07 ENCOUNTER — TREATMENT (OUTPATIENT)
Dept: PHYSICAL THERAPY | Facility: CLINIC | Age: 79
End: 2024-08-07
Payer: MEDICARE

## 2024-08-07 DIAGNOSIS — R29.898 HIP WEAKNESS: ICD-10-CM

## 2024-08-07 DIAGNOSIS — Z96.642 STATUS POST TOTAL HIP REPLACEMENT, LEFT: Primary | ICD-10-CM

## 2024-08-07 DIAGNOSIS — Z74.09 IMPAIRED FUNCTIONAL MOBILITY, BALANCE, GAIT, AND ENDURANCE: ICD-10-CM

## 2024-08-07 DIAGNOSIS — Z47.89 ORTHOPEDIC AFTERCARE: ICD-10-CM

## 2024-08-07 NOTE — PROGRESS NOTES
Physical Therapy Daily Treatment Note                  [address] [facility] [address]  [place] [address]      Patient: Papito Salinas   : 1945  Diagnosis/ICD-10 Code:  Status post total hip replacement, left [Z96.642]  Referring practitioner: RANDALL Stoner*  Date of Initial Visit: Type: THERAPY  Noted: 2024  Today's Date: 2024  Patient seen for 17 sessions             Subjective   Papito Salinas reports: sees the doctor Friday about his blood pressure. The seated leg kicks have gotten too easy at home because he doesn't have ankle weights.     Objective   See Exercise, Manual, and Modality Logs for complete treatment.       Assessment/Plan  Demonstrated resistant band LAQ that he can progress at home. Changed program to challenge hip abduction and extension on the mat against gravity. He did need verbal and tactile cues but able to complete. Also add prone quad stretch to continue improving tissue length in the anterior hip s/p NAVNEET. Pt will need a reassessment after next visit and may be ready for independent HEP.    Progress per Plan of Care and Progress strengthening /stabilization /functional activity           Timed:  Manual Therapy:         mins  56074;  Therapeutic Exercise:    30     mins  22760;     Neuromuscular Ean:    12    mins  60720;    Therapeutic Activity:          mins  53032;     Gait Training:           mins  23555;     Ultrasound:          mins  17762;    Electrical Stimulation:         mins  27774;  Iontophoresis          mins  88105    Untimed:  Electrical Stimulation:         mins  78155 (MC );  Mechanical Traction:         mins  05825;   Fluidotherapy          mins  28180    Timed Treatment:   42   mins   Total Treatment:     42   mins        Ellyn Harrison PTA  Physical Therapist Assistant

## 2024-08-09 ENCOUNTER — OFFICE VISIT (OUTPATIENT)
Dept: FAMILY MEDICINE CLINIC | Facility: CLINIC | Age: 79
End: 2024-08-09
Payer: MEDICARE

## 2024-08-09 VITALS
SYSTOLIC BLOOD PRESSURE: 120 MMHG | TEMPERATURE: 96.6 F | RESPIRATION RATE: 18 BRPM | HEART RATE: 68 BPM | OXYGEN SATURATION: 96 % | BODY MASS INDEX: 29.55 KG/M2 | DIASTOLIC BLOOD PRESSURE: 68 MMHG | HEIGHT: 73 IN | WEIGHT: 223 LBS

## 2024-08-09 DIAGNOSIS — G62.9 PERIPHERAL POLYNEUROPATHY: ICD-10-CM

## 2024-08-09 DIAGNOSIS — M47.817 LUMBOSACRAL SPONDYLOSIS WITHOUT MYELOPATHY: Primary | ICD-10-CM

## 2024-08-09 DIAGNOSIS — G47.00 INSOMNIA, UNSPECIFIED TYPE: ICD-10-CM

## 2024-08-09 PROCEDURE — 99213 OFFICE O/P EST LOW 20 MIN: CPT | Performed by: FAMILY MEDICINE

## 2024-08-09 PROCEDURE — 1160F RVW MEDS BY RX/DR IN RCRD: CPT | Performed by: FAMILY MEDICINE

## 2024-08-09 PROCEDURE — 3078F DIAST BP <80 MM HG: CPT | Performed by: FAMILY MEDICINE

## 2024-08-09 PROCEDURE — 1159F MED LIST DOCD IN RCRD: CPT | Performed by: FAMILY MEDICINE

## 2024-08-09 PROCEDURE — 3074F SYST BP LT 130 MM HG: CPT | Performed by: FAMILY MEDICINE

## 2024-08-09 PROCEDURE — 1125F AMNT PAIN NOTED PAIN PRSNT: CPT | Performed by: FAMILY MEDICINE

## 2024-08-09 RX ORDER — GABAPENTIN 300 MG/1
300 CAPSULE ORAL
Qty: 90 CAPSULE | Refills: 1 | Status: SHIPPED | OUTPATIENT
Start: 2024-08-09

## 2024-08-09 RX ORDER — HYDROCODONE BITARTRATE AND ACETAMINOPHEN 7.5; 325 MG/1; MG/1
1 TABLET ORAL EVERY 6 HOURS PRN
Qty: 60 TABLET | Refills: 0 | Status: SHIPPED | OUTPATIENT
Start: 2024-08-09

## 2024-08-09 RX ORDER — TEMAZEPAM 15 MG/1
15 CAPSULE ORAL NIGHTLY PRN
Qty: 30 CAPSULE | Refills: 5 | Status: SHIPPED | OUTPATIENT
Start: 2024-08-09

## 2024-08-09 NOTE — PROGRESS NOTES
Subjective   Papito Salinas is a 78 y.o. male    Chief Complaint    Orthostatic hypotension  Hypertension  Dizziness and weakness    HPI  History of Present Illness  The patient is a 78-year-old male who presents for evaluation of recent episodes of dizziness. Orthostatic blood pressure when seen at a different medical office showed a drop in his blood pressure and they stopped his valsartan and told to follow up here. He is also on metoprolol 100 mg daily, Cardura 4 mg at bedtime that are affecting his blood pressure. He is accompanied by his wife.    He experiences dizziness upon standing from a seated position, accompanied by a feeling of instability. He has experienced several falls due to dizziness and loses balance easily. He is currently undergoing physical therapy. He also reports feeling lightheaded. His legs have been swollen, but this has improved since he stopped taking valsartan. He is currently taking Bactrim.    Supplemental Information  His hip replacement went great. His CRP level is 0.84. He had a gout flare up on 07/01/2024. He usually takes prednisone when he gets a bout of gout. He was given 4 pills for 2 days. He stopped taking amitriptyline for a week. He was put on another antibiotic for a week for a staph infection.      The following portions of the patient's history were reviewed and updated as appropriate: allergies, current medications, past social history and problem list    Review of Systems   Constitutional:  Negative for fatigue and unexpected weight change.   Respiratory:  Negative for cough, chest tightness and shortness of breath.    Cardiovascular:  Positive for leg swelling. Negative for chest pain and palpitations.   Gastrointestinal:  Negative for diarrhea, nausea and vomiting.   Musculoskeletal:  Positive for arthralgias, gait problem and myalgias.   Skin:  Negative for color change and rash.   Neurological:  Positive for dizziness and light-headedness. Negative for  syncope, weakness and headaches.   Hematological:  Negative for adenopathy. Bruises/bleeds easily.       Objective     Vitals:    07/23/24 1118   BP: 166/88   Pulse: 62   Resp: 18   Temp: 98.6 °F (37 °C)   SpO2: 96%       Physical Exam  Vitals and nursing note reviewed.   Constitutional:       General: He is not in acute distress.     Appearance: Normal appearance. He is well-developed. He is not ill-appearing, toxic-appearing or diaphoretic.   HENT:      Head: Normocephalic and atraumatic.   Eyes:      General: No scleral icterus.     Conjunctiva/sclera: Conjunctivae normal.      Pupils: Pupils are equal, round, and reactive to light.   Neck:      Vascular: No carotid bruit or JVD.   Cardiovascular:      Rate and Rhythm: Normal rate and regular rhythm.      Pulses: Normal pulses.      Heart sounds: Normal heart sounds. No murmur heard.  Pulmonary:      Effort: Pulmonary effort is normal. No respiratory distress.      Breath sounds: Normal breath sounds.   Abdominal:      Palpations: Abdomen is soft.      Tenderness: There is no abdominal tenderness.   Musculoskeletal:      Right lower leg: Edema present.      Left lower leg: Edema present.   Skin:     General: Skin is warm and dry.   Neurological:      General: No focal deficit present.      Mental Status: He is alert and oriented to person, place, and time.      Motor: Weakness present.      Gait: Gait abnormal.   Psychiatric:         Mood and Affect: Mood normal.         Behavior: Behavior normal.     Physical Exam      Assessment & Plan   Assessment & Plan  1. Dizziness.  The patient's dizziness could be attributed to an interaction between his antibiotics and antihypertensive medications. The decision was made to temporarily discontinue valsartan to observe if the dizziness and blood pressure improve. If no improvement is observed, an alternative treatment plan will be considered. The possibility of resuming metoprolol by half will be considered. A handicap  sticker was provided for 3 months.    2. Hypertension.  The patient's leg swelling has improved.    Follow-up  A follow-up appointment is scheduled in 2 weeks.    Problems Addressed this Visit          Genitourinary and Reproductive     Hypertrophy of prostate without urinary obstruction     Other Visit Diagnoses       Orthostatic hypotension    -  Primary    Essential hypertension        Dizziness        Chronic bilateral low back pain with right-sided sciatica              Diagnoses         Codes Comments    Orthostatic hypotension    -  Primary ICD-10-CM: I95.1  ICD-9-CM: 458.0     Essential hypertension     ICD-10-CM: I10  ICD-9-CM: 401.9     Dizziness     ICD-10-CM: R42  ICD-9-CM: 780.4     Chronic bilateral low back pain with right-sided sciatica     ICD-10-CM: M54.41, G89.29  ICD-9-CM: 724.2, 724.3, 338.29     Hypertrophy of prostate without urinary obstruction     ICD-10-CM: N40.0  ICD-9-CM: 600.90           I spent 35 minutes in patient care: Reviewing records prior to the visit, examining the patient, entering orders and documentation    Part of this note may be an electronic transcription/translation of spoken language to printed text using the Dragon Dictation System.

## 2024-08-09 NOTE — PROGRESS NOTES
Subjective   Papito Salinas is a 78 y.o. male    Chief Complaint    Orthostatic hypotension  Hypertension  Bilateral lower extremity edema  Chronic back pain due to lumbosacral spondylosis and spinal stenosis  Insomnia    History of Present Illness  History of Present Illness  The patient is a 78-year-old male here for follow-up regarding blood pressure problems. He was seen here 2 weeks ago for hypotensive episodes. He is holding his normal dose of valsartan as he is continuing to take an oral dose of Bactrim. Blood pressure at infectious disease was 120/59 and today it is 120/68. He is accompanied by his wife.    His last blood work was done by his  ID specialist. The duration of his antibiotic treatment will be determined. His Bactrim dosage has been reduced to 1 pill per day, and he is responding well to it. He has discontinued valsartan, which he feels led to a reduction in swelling in his legs and ankles. However, he still experiences some swelling. He wonders if valsartan could be causing the swelling. He takes doxazosin 4 mg at bedtime. He still experiences dizziness when exiting his car or walking, but the severity has decreased.    Supplemental Information  He does not have any refills on gabapentin or his temazepam. He was given 5 hydrocodone by the orthopedic surgeon after his hip replacement. He can not take Aleve or Motrin.      The following portions of the patient's history were reviewed and updated as appropriate: allergies, current medications, past social history and problem list    Review of Systems   Constitutional:  Negative for fatigue and unexpected weight change.   Respiratory:  Negative for cough, chest tightness and shortness of breath.    Cardiovascular:  Positive for leg swelling. Negative for chest pain and palpitations.   Gastrointestinal:  Negative for diarrhea, nausea and vomiting.   Musculoskeletal:  Positive for arthralgias, gait problem and myalgias.   Skin:  Negative for  color change and rash.   Neurological:  Positive for dizziness and light-headedness. Negative for syncope, weakness and headaches.   Hematological:  Negative for adenopathy. Bruises/bleeds easily.     Objective     Vitals:    08/09/24 0952   BP: 120/68   Pulse: 68   Resp: 18   Temp: 96.6 °F (35.9 °C)   SpO2: 96%       Physical Exam  Vitals and nursing note reviewed.   Constitutional:       General: He is not in acute distress.     Appearance: Normal appearance. He is well-developed. He is not ill-appearing, toxic-appearing or diaphoretic.   HENT:      Head: Normocephalic and atraumatic.   Eyes:      General: No scleral icterus.     Conjunctiva/sclera: Conjunctivae normal.      Pupils: Pupils are equal, round, and reactive to light.   Neck:      Vascular: No carotid bruit or JVD.   Cardiovascular:      Rate and Rhythm: Normal rate and regular rhythm.      Pulses: Normal pulses.      Heart sounds: Normal heart sounds. No murmur heard.  Pulmonary:      Effort: Pulmonary effort is normal. No respiratory distress.      Breath sounds: Normal breath sounds.   Abdominal:      Palpations: Abdomen is soft.      Tenderness: There is no abdominal tenderness.   Musculoskeletal:      Right lower leg: Edema present.      Left lower leg: Edema present.   Skin:     General: Skin is warm and dry.   Neurological:      General: No focal deficit present.      Mental Status: He is alert and oriented to person, place, and time.      Motor: Weakness present.      Gait: Gait abnormal.   Psychiatric:         Mood and Affect: Mood normal.         Behavior: Behavior normal.   Physical Exam  Vital Signs  Vitals show a blood pressure of 120/68.    Assessment & Plan   Assessment & Plan  1. Hypotension.  There appears to be an interaction between his antibiotic and blood pressure medication, considering the previous difficulties in managing his blood pressure. The low blood pressure suggests insufficient fluid accumulation. The doxazosin, also  known to cause low blood pressure, may be contributing to this. The doxazosin dosage will be reduced to half a tablet daily. He will discontinue valsartan. His blood pressure will be checked in a seated position and standing position at the next visit. He will provide feedback next week on the effectiveness of the reduced doxazosin dosage. If the half tablet of doxazosin proves effective, the current dosage will be maintained. If it does not, the dosage will be discontinued completely.    2. Medication refills.  Refills for gabapentin, hydrocodone, and other medications will be provided. He is advised to avoid anti-inflammatories, but can take Tylenol as needed.    Follow-up  He will complete his course of Bactrim.    Problems Addressed this Visit          Neuro    Lumbosacral spondylosis without myelopathy - Primary    Relevant Medications    gabapentin (NEURONTIN) 300 MG capsule    HYDROcodone-acetaminophen (NORCO) 7.5-325 MG per tablet     Other Visit Diagnoses       Peripheral polyneuropathy        Relevant Medications    gabapentin (NEURONTIN) 300 MG capsule    Insomnia, unspecified type        Relevant Medications    temazepam (RESTORIL) 15 MG capsule          Diagnoses         Codes Comments    Lumbosacral spondylosis without myelopathy    -  Primary ICD-10-CM: M47.817  ICD-9-CM: 721.3     Peripheral polyneuropathy     ICD-10-CM: G62.9  ICD-9-CM: 356.9     Insomnia, unspecified type     ICD-10-CM: G47.00  ICD-9-CM: 780.52           I have asked the patient or his wife to call me in a couple of weeks and let me know how he is doing on half of the doxazosin tablet.  He is to remain on one half of his metoprolol also.

## 2024-08-14 ENCOUNTER — TREATMENT (OUTPATIENT)
Dept: PHYSICAL THERAPY | Facility: CLINIC | Age: 79
End: 2024-08-14
Payer: MEDICARE

## 2024-08-14 DIAGNOSIS — Z96.642 STATUS POST TOTAL HIP REPLACEMENT, LEFT: Primary | ICD-10-CM

## 2024-08-14 DIAGNOSIS — R29.898 HIP WEAKNESS: ICD-10-CM

## 2024-08-14 DIAGNOSIS — Z47.89 ORTHOPEDIC AFTERCARE: ICD-10-CM

## 2024-08-14 DIAGNOSIS — Z74.09 IMPAIRED FUNCTIONAL MOBILITY, BALANCE, GAIT, AND ENDURANCE: ICD-10-CM

## 2024-08-14 NOTE — PROGRESS NOTES
Physical Therapy Daily Treatment Note                  [address] [facility] [address]  [place] [address]      Patient: Papito Salinas   : 1945  Diagnosis/ICD-10 Code:  Status post total hip replacement, left [Z96.642]  Referring practitioner: RANDALL Stoner*  Date of Initial Visit: Type: THERAPY  Noted: 2024  Today's Date: 2024  Patient seen for 18 sessions             Subjective   Papito Salinas reports: if he doesn't stay conscious of his walking he starts to limp again out of habit. A little more tightness in the front of the left hip today. He has not been completing HEP as he should. Not having pain when walking so thinks the limp may be from habit.     Objective   See Exercise, Manual, and Modality Logs for complete treatment.       Assessment/Plan  Able to decrease soreness and tightness with quad and hip stretching. Pt does tend to compensate using the muscles of the anterior hip when weight bearing compared to the glutes unless cued. Worked on engaging glutes when completing SLB that improved anterior hip. He was also able to complete normal stride with practice and effort. Pt had an antalgic gait for months prior to NAVNEET that can be hard to break.   Progress per Plan of Care and Progress strengthening /stabilization /functional activity           Timed:  Manual Therapy:         mins  52417;  Therapeutic Exercise:   20     mins  19791;     Neuromuscular Ean:    10    mins  79758;    Therapeutic Activity:          mins  97250;     Gait Trainin      mins  74938;     Ultrasound:          mins  09306;    Electrical Stimulation:         mins  57748;  Iontophoresis          mins  08142    Untimed:  Electrical Stimulation:         mins  32569 ( );  Mechanical Traction:         mins  33864;   Fluidotherapy          mins  97882    Timed Treatment:   38   mins   Total Treatment:     38   mins        Ellny Harrison PTA  Physical Therapist  Assistant

## 2024-08-19 ENCOUNTER — LAB (OUTPATIENT)
Dept: LAB | Facility: HOSPITAL | Age: 79
End: 2024-08-19
Payer: MEDICARE

## 2024-08-19 ENCOUNTER — TRANSCRIBE ORDERS (OUTPATIENT)
Dept: LAB | Facility: HOSPITAL | Age: 79
End: 2024-08-19
Payer: MEDICARE

## 2024-08-19 DIAGNOSIS — L08.89 OTHER SPECIFIED LOCAL INFECTIONS OF THE SKIN AND SUBCUTANEOUS TISSUE: ICD-10-CM

## 2024-08-19 DIAGNOSIS — L03.116 CELLULITIS OF LEFT LOWER EXTREMITY: ICD-10-CM

## 2024-08-19 DIAGNOSIS — T84.52XD INFECTION ASSOCIATED WITH INTERNAL LEFT HIP PROSTHESIS, SUBSEQUENT ENCOUNTER: Primary | ICD-10-CM

## 2024-08-19 DIAGNOSIS — B96.89 OTH BACTERIAL AGENTS AS THE CAUSE OF DISEASES CLASSD ELSWHR: ICD-10-CM

## 2024-08-19 DIAGNOSIS — T84.52XD INFECTION ASSOCIATED WITH INTERNAL LEFT HIP PROSTHESIS, SUBSEQUENT ENCOUNTER: ICD-10-CM

## 2024-08-19 LAB
ALBUMIN SERPL-MCNC: 3.6 G/DL (ref 3.5–5.2)
ALBUMIN/GLOB SERPL: 0.9 G/DL
ALP SERPL-CCNC: 139 U/L (ref 39–117)
ALT SERPL W P-5'-P-CCNC: 20 U/L (ref 1–41)
ANION GAP SERPL CALCULATED.3IONS-SCNC: 8 MMOL/L (ref 5–15)
AST SERPL-CCNC: 26 U/L (ref 1–40)
BASOPHILS # BLD AUTO: 0.02 10*3/MM3 (ref 0–0.2)
BASOPHILS NFR BLD AUTO: 0.3 % (ref 0–1.5)
BILIRUB SERPL-MCNC: 0.2 MG/DL (ref 0–1.2)
BUN SERPL-MCNC: 11 MG/DL (ref 8–23)
BUN/CREAT SERPL: 8.4 (ref 7–25)
CALCIUM SPEC-SCNC: 8.9 MG/DL (ref 8.6–10.5)
CHLORIDE SERPL-SCNC: 102 MMOL/L (ref 98–107)
CO2 SERPL-SCNC: 27 MMOL/L (ref 22–29)
CREAT SERPL-MCNC: 1.31 MG/DL (ref 0.76–1.27)
CRP SERPL-MCNC: 0.71 MG/DL (ref 0–0.5)
DEPRECATED RDW RBC AUTO: 56.6 FL (ref 37–54)
EGFRCR SERPLBLD CKD-EPI 2021: 56.1 ML/MIN/1.73
EOSINOPHIL # BLD AUTO: 0.3 10*3/MM3 (ref 0–0.4)
EOSINOPHIL NFR BLD AUTO: 4.5 % (ref 0.3–6.2)
ERYTHROCYTE [DISTWIDTH] IN BLOOD BY AUTOMATED COUNT: 17.2 % (ref 12.3–15.4)
ERYTHROCYTE [SEDIMENTATION RATE] IN BLOOD: 28 MM/HR (ref 0–20)
GLOBULIN UR ELPH-MCNC: 3.8 GM/DL
GLUCOSE SERPL-MCNC: 99 MG/DL (ref 65–99)
HCT VFR BLD AUTO: 37.2 % (ref 37.5–51)
HGB BLD-MCNC: 11.8 G/DL (ref 13–17.7)
IMM GRANULOCYTES # BLD AUTO: 0.02 10*3/MM3 (ref 0–0.05)
IMM GRANULOCYTES NFR BLD AUTO: 0.3 % (ref 0–0.5)
LYMPHOCYTES # BLD AUTO: 1.12 10*3/MM3 (ref 0.7–3.1)
LYMPHOCYTES NFR BLD AUTO: 17 % (ref 19.6–45.3)
MCH RBC QN AUTO: 28.4 PG (ref 26.6–33)
MCHC RBC AUTO-ENTMCNC: 31.7 G/DL (ref 31.5–35.7)
MCV RBC AUTO: 89.6 FL (ref 79–97)
MONOCYTES # BLD AUTO: 0.44 10*3/MM3 (ref 0.1–0.9)
MONOCYTES NFR BLD AUTO: 6.7 % (ref 5–12)
NEUTROPHILS NFR BLD AUTO: 4.7 10*3/MM3 (ref 1.7–7)
NEUTROPHILS NFR BLD AUTO: 71.2 % (ref 42.7–76)
NRBC BLD AUTO-RTO: 0 /100 WBC (ref 0–0.2)
PLATELET # BLD AUTO: 157 10*3/MM3 (ref 140–450)
PMV BLD AUTO: 10.5 FL (ref 6–12)
POTASSIUM SERPL-SCNC: 4.7 MMOL/L (ref 3.5–5.2)
PROT SERPL-MCNC: 7.4 G/DL (ref 6–8.5)
RBC # BLD AUTO: 4.15 10*6/MM3 (ref 4.14–5.8)
SODIUM SERPL-SCNC: 137 MMOL/L (ref 136–145)
WBC NRBC COR # BLD AUTO: 6.6 10*3/MM3 (ref 3.4–10.8)

## 2024-08-19 PROCEDURE — 80053 COMPREHEN METABOLIC PANEL: CPT

## 2024-08-19 PROCEDURE — 85025 COMPLETE CBC W/AUTO DIFF WBC: CPT

## 2024-08-19 PROCEDURE — 85652 RBC SED RATE AUTOMATED: CPT

## 2024-08-19 PROCEDURE — 86140 C-REACTIVE PROTEIN: CPT

## 2024-08-19 PROCEDURE — 36415 COLL VENOUS BLD VENIPUNCTURE: CPT

## 2024-08-22 ENCOUNTER — TELEPHONE (OUTPATIENT)
Dept: FAMILY MEDICINE CLINIC | Facility: CLINIC | Age: 79
End: 2024-08-22

## 2024-08-22 ENCOUNTER — TREATMENT (OUTPATIENT)
Dept: PHYSICAL THERAPY | Facility: CLINIC | Age: 79
End: 2024-08-22
Payer: MEDICARE

## 2024-08-22 DIAGNOSIS — R29.898 HIP WEAKNESS: ICD-10-CM

## 2024-08-22 DIAGNOSIS — Z47.89 ORTHOPEDIC AFTERCARE: ICD-10-CM

## 2024-08-22 DIAGNOSIS — Z74.09 IMPAIRED FUNCTIONAL MOBILITY, BALANCE, GAIT, AND ENDURANCE: ICD-10-CM

## 2024-08-22 DIAGNOSIS — Z96.642 STATUS POST TOTAL HIP REPLACEMENT, LEFT: Primary | ICD-10-CM

## 2024-08-22 PROCEDURE — 97110 THERAPEUTIC EXERCISES: CPT | Performed by: PHYSICAL THERAPIST

## 2024-08-22 PROCEDURE — 97112 NEUROMUSCULAR REEDUCATION: CPT | Performed by: PHYSICAL THERAPIST

## 2024-08-22 PROCEDURE — 97530 THERAPEUTIC ACTIVITIES: CPT | Performed by: PHYSICAL THERAPIST

## 2024-08-22 NOTE — PROGRESS NOTES
"Physical Therapy Reassessment  [address] [facility] [address]    [place] [address]  Patient: Papito Salinas   : 1945  Diagnosis/ICD-10 Code:  Status post total hip replacement, left [Z96.642]  Referring practitioner: RANDALL Stoner*  Date of Initial Visit: 2024  Today's Date: 2024  Patient seen for 19 sessions         Visit Diagnoses:    ICD-10-CM ICD-9-CM   1. Status post total hip replacement, left  Z96.642 V43.64   2. Orthopedic aftercare  Z47.89 V54.9   3. Hip weakness  R29.898 729.89   4. Impaired functional mobility, balance, gait, and endurance  Z74.09 V49.89       Subjective Questionnaire: LEFS: 4480  Clinical Progress: improved  Home Program Compliance: Yes  Treatment has included: therapeutic exercise, neuromuscular re-education, manual therapy, therapeutic activity, and gait training      Subjective   Papito Salinas reports: Denies any recent dizziness. Still feels like he has to pull himself up stairs w/ his hands when leading with his left leg. No issues if taking one at a time. Still needs hands to get up from chair. Denies hip pn. No longer having difficulty w/ lower body dressing.    Pre tx pn score: 0  Post tx pn score: 0    Treatment  See Exercise, Manual, and Modality Logs for complete treatment.     Access Code: [email or web address]  URL: [email or web address]  Date: 2024  Prepared by: Ellyn Tran    Exercises  - Sit to Stand  - 3-4 x weekly - 1-2 sets - 10 reps  - Forward Step Up with Counter Support  - 3-4 x weekly - 15-30 reps  - Mini Squat with Counter Support  - 3-4 x weekly - 15-30 reps  - Standing Hip Abduction with Resistance at Ankles and Counter Support  - 3-4 x weekly - 15-30 reps  - Standing Single Leg Stance with Counter Support  - 3-4 x weekly - 2 reps - 30 sec hold    Objective     Left hip flexion AROM 115 degrees (supine), ER 35 deg     MMT: left hip ER 5/5 , hip abd 4/5, hip extn 4/5  L knee flxn/extn 5/5     30\" STS test=7 reps    Ambulates " independently, dec stride length.  Requires use of hands w/ STS.    Assessment & Plan       Assessment  Assessment details: Reassessment performed. Pt reports little to no L hip pn. Subjectively reports improvement in balance, ability to perform lower body dressing, and generalized strength. Demo functional hip mobility, improving hip/knee strength. SLS remains difficult bilaterally. Still exhibits some weakness w/ hip extn that likely contributes to mild difficulty w/ reciprocal stair navigation. However pt doing very well overall, pleased w/ his progress. He has met nearly all PT goals and would likely do well w/ independent mgmt. He requests one additional visit to assess response to updated HEP.    Goals  Plan Goals: STG 4 wks  1) Pt to be compliant w/ initial HEP for ROM, strength, and symptom mgmt.-MET  2) Pt to report pn at rest and w/ household ambulation to be no greater than 5/10.-MET  3) Pt to improve L hip strength to 3+/5 or better in all planes.-MET  4) Pt to improve L hip AROM to 80 deg flxn, 15 deg ER or better to assist w/ lower body hygiene/dressing.-MET  5) Pt to improve LEFS score to 30/80 or better to reflect improved pn and function.-MET  6) Pt to progress to ambulation w/ SPC w/ appropriate gait mechanics.-met     LTG 8 wks  1) Pt to be independent w/ long term HEP and self mgmt.-ONGOING  2) Pt to progress to independent ambulation over household and community distances w/ appropriate gait mechanics.met  3) Pt to improve L hip strength to 4+/5 or better in all planes.-partially met  4) Pt to improve L hip ROM to 100 deg flxn, 25 deg ER or better to assist w/ lower body dressing/hygiene- MET  5) Pt to improve LEFS score to 45/80 or better to reflect improved pn and function.-progressing (44/80)  6) Pt to maintain SLS on LLE x10 sec or greater to reflect improved strength/stability.-ONGOING      Plan  Plan details: F/u on updated HEP and plan d/c if no issues        Progress toward previous  goals: Partially Met        Timed:         Manual Therapy:    0     mins  91382;     Therapeutic Exercise:    20     mins  89536;     Neuromuscular Ean:    10    mins  12184;    Therapeutic Activity:     20     mins  69567;     Gait Trainin     mins  61478;     Ultrasound:     0     mins  86249;    Ionto                               0    mins   93256  Self Care                       0     mins   19591  Canalith Repos    0     mins 49515      Un-Timed:  Electrical Stimulation:    0     mins  60699 ( );  Dry Needling     0     mins self-pay  Traction     0     mins 04640  Re-Eval                           0    mins  31887      Timed Treatment:   50   mins   Total Treatment:     50   mins          PT: Ellyn Tran, PT     [place] License: #[id number]    Electronically signed by Ellyn Tran PT, 24, 1:04 PM EDT

## 2024-08-29 ENCOUNTER — TREATMENT (OUTPATIENT)
Dept: PHYSICAL THERAPY | Facility: CLINIC | Age: 79
End: 2024-08-29
Payer: MEDICARE

## 2024-08-29 DIAGNOSIS — Z96.642 STATUS POST TOTAL HIP REPLACEMENT, LEFT: Primary | ICD-10-CM

## 2024-08-29 PROCEDURE — 97530 THERAPEUTIC ACTIVITIES: CPT | Performed by: PHYSICAL THERAPIST

## 2024-08-29 NOTE — PROGRESS NOTES
Physical Therapy Daily Treatment Note                  [address] [facility] [address]  [place] [address]      Patient: Papito Salinas   : 1945  Diagnosis/ICD-10 Code:  Status post total hip replacement, left [Z96.642]  Referring practitioner: RANDALL Stoner*  Date of Initial Visit: Type: THERAPY  Noted: 2024  Today's Date: 2024  Patient seen for 20 sessions             Subjective   Papito Salinas reports: dizziness has stopped after talking with disease and cardiac doctors changing medications. He is having a hard time completing the sit to stand at home because he cannot do it from a normal chair without using his hands. Pt wanted to know if he can keep up with the other stretches and exercises.     Objective   SL balance unable to complete without one UE support.  See Exercise, Manual, and Modality Logs for complete treatment.       Assessment/Plan  Spent majority of time educating patient on the importance of HEP maintenance when D/C therapy to continue to build strength. Able to modify sit to stand with no UE support using a pillow on seat to give an extra half inch. He will start with pillow in chair at home and work up to taking it away. He can continue stretches as needed from adam HEPs. SL balance continues to be a challenge. See PT note for D/C details.   Other D/C           Timed:  Manual Therapy:         mins  99207;  Therapeutic Exercise:         mins  37977;     Neuromuscular Ean:        mins  45393;    Therapeutic Activity:     15     mins  22387;     Gait Training:           mins  76216;     Ultrasound:          mins  19048;    Electrical Stimulation:         mins  56006;  Iontophoresis          mins  55863    Untimed:  Electrical Stimulation:         mins  75593 ( );  Mechanical Traction:         mins  87616;   Fluidotherapy          mins  28853    Timed Treatment:   15   mins   Total Treatment:     15   mins        Ellyn  Christy, MARCELA  Physical Therapist Assistant

## 2024-09-05 ENCOUNTER — DOCUMENTATION (OUTPATIENT)
Dept: PHYSICAL THERAPY | Facility: CLINIC | Age: 79
End: 2024-09-05
Payer: MEDICARE

## 2024-09-05 DIAGNOSIS — Z96.642 STATUS POST TOTAL HIP REPLACEMENT, LEFT: Primary | ICD-10-CM

## 2024-09-05 DIAGNOSIS — R29.898 HIP WEAKNESS: ICD-10-CM

## 2024-09-05 DIAGNOSIS — Z74.09 IMPAIRED FUNCTIONAL MOBILITY, BALANCE, GAIT, AND ENDURANCE: ICD-10-CM

## 2024-09-05 DIAGNOSIS — Z47.89 ORTHOPEDIC AFTERCARE: ICD-10-CM

## 2024-09-05 NOTE — PROGRESS NOTES
Discharge Summary  Discharge Summary from Physical Therapy Report  [address] [facility] [address]    [place] [address]    Patient: Papito Salinas   : 1945  Diagnosis/ICD-10 Code:  The primary encounter diagnosis was Status post total hip replacement, left. Diagnoses of Hip weakness, Impaired functional mobility, balance, gait, and endurance, and Orthopedic aftercare were also pertinent to this visit.   Referring practitioner: RANDALL Stoner*  Date of Initial Visit: Type: THERAPY  Noted: 2024  Today's Date: 2024      Number of Visits: 20     Date of Discharge: 24    Goals: All Met    Assessment/Reason for Discharge: independent w/ HEP, goals met    Discharge Plan: Continue with current home exercise program as instructed            Ellyn Tran, PT  Physical Therapist                       rate, regular rhythm and normal heart sounds. No murmur heard. Pulmonary/Chest: Effort and breath sounds normal.   Abdominal: Soft, non-tender. No distension or masses. Genitourinary: urethral meatus normal, normal appearing vulva with no masses, tenderness or lesions, Vagina:  discharge: white and thick- no foul odor, Cervix:  Friable cervix, Uterus:  normal single, nontender and Adnexa:  normal bimanual exam.  Breast exam:  breasts appear normal, no suspicious masses, no skin or nipple changes or axillary nodes, nipple rings bilateral.  Neurological: She is alert and oriented to person, place, and time. Skin: Skin is warm and dry. No rash noted. No erythema. Psychiatric: She has a normal mood and affect. Her behavior is normal.     Assessment/Plan:  1. Well woman exam with routine gynecological exam  PAP SMEAR    C.trachomatis N.gonorrhoeae DNA, Thin Prep    VAGINAL PATHOGENS PROBE *A   2. Diabetes mellitus screening  COMPREHENSIVE METABOLIC PANEL   3. Lipid screening  LIPID PANEL   4. Screening for HIV without presence of risk factors  HIV-1 AND HIV-2 ANTIBODIES   5. Need for Tdap vaccination  Tdap (age 10y-63y) IM (ADACEL)   Rylee was seen today for gynecologic exam.    Diagnoses and all orders for this visit:    Well woman exam with routine gynecological exam  Pap obtained  -     PAP SMEAR; Future  -     C.trachomatis N.gonorrhoeae DNA, Thin Prep; Future  -     VAGINAL PATHOGENS PROBE *A    Diabetes mellitus screening  -     COMPREHENSIVE METABOLIC PANEL; Future    Lipid screening  -     LIPID PANEL; Future    Screening for HIV without presence of risk factors  -     HIV-1 AND HIV-2 ANTIBODIES;  Future    Need for Tdap vaccination  -     Tdap (age 10y-63y) IM (ADACEL)

## 2024-09-17 DIAGNOSIS — I10 ESSENTIAL HYPERTENSION: ICD-10-CM

## 2024-09-17 RX ORDER — VALSARTAN 160 MG/1
160 TABLET ORAL DAILY
Qty: 90 TABLET | Refills: 3 | Status: SHIPPED | OUTPATIENT
Start: 2024-09-17

## 2024-10-03 DIAGNOSIS — I10 ESSENTIAL HYPERTENSION: ICD-10-CM

## 2024-10-03 RX ORDER — DOXAZOSIN 4 MG/1
4 TABLET ORAL
Qty: 90 TABLET | Refills: 3 | Status: SHIPPED | OUTPATIENT
Start: 2024-10-03

## 2024-10-21 ENCOUNTER — OFFICE VISIT (OUTPATIENT)
Dept: ORTHOPEDIC SURGERY | Facility: CLINIC | Age: 79
End: 2024-10-21
Payer: MEDICARE

## 2024-10-21 VITALS
BODY MASS INDEX: 33.34 KG/M2 | WEIGHT: 251.6 LBS | SYSTOLIC BLOOD PRESSURE: 126 MMHG | DIASTOLIC BLOOD PRESSURE: 68 MMHG | HEIGHT: 73 IN

## 2024-10-21 DIAGNOSIS — Z09 POSTOPERATIVE EXAMINATION: ICD-10-CM

## 2024-10-21 DIAGNOSIS — Z96.642 STATUS POST TOTAL HIP REPLACEMENT, LEFT: Primary | ICD-10-CM

## 2024-10-21 PROCEDURE — 99212 OFFICE O/P EST SF 10 MIN: CPT | Performed by: ORTHOPAEDIC SURGERY

## 2024-10-21 PROCEDURE — 1160F RVW MEDS BY RX/DR IN RCRD: CPT | Performed by: ORTHOPAEDIC SURGERY

## 2024-10-21 PROCEDURE — 3078F DIAST BP <80 MM HG: CPT | Performed by: ORTHOPAEDIC SURGERY

## 2024-10-21 PROCEDURE — 1159F MED LIST DOCD IN RCRD: CPT | Performed by: ORTHOPAEDIC SURGERY

## 2024-10-21 PROCEDURE — 3074F SYST BP LT 130 MM HG: CPT | Performed by: ORTHOPAEDIC SURGERY

## 2024-10-21 RX ORDER — SULFAMETHOXAZOLE AND TRIMETHOPRIM 800; 160 MG/1; MG/1
TABLET ORAL
COMMUNITY
Start: 2024-08-20 | End: 2025-05-17

## 2024-10-21 NOTE — PROGRESS NOTES
[facility]    Subjective     Chief Complaint   Patient presents with    Follow-up     3.5 month follow up--5 months S/P Left Total Hip Incision & Drainage, Poly Exchange (05/14/2024) 6 months S/P Modified Anterior Left NAVNEET (4/23/24)        HPI    It has been 3  month(s) since Mr. Salinas's last visit. He returns to clinic today for follow-up of left hip arthroplasty. The issue has been ongoing for 6 month(s). He rates his pain a 0/10 on the pain scale. Previous/current treatments: NSAIDS and physical therapy. Current symptoms:  none . The pain is worse with  nothing ; none improve the pain. Overall, he is doing better.  He continues on Bactrim following his Serratia infection.  95 to 100% improvement compared to his preoperative symptoms.  Fully ambulatory without external aids.      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    Status post total hip replacement, left    S/P debridement left hip wound with head and liner exchange     Past Medical History:   Diagnosis Date    Arthritis     Edema     Lower extremities    GERD (gastroesophageal reflux disease)     History of transfusion     Hypertension     Insomnia     Plantar fascial fibromatosis     Tibia fracture     Left      Past Surgical History:   Procedure Laterality Date    CATARACT EXTRACTION Bilateral     COLONOSCOPY      INCISION AND DRAINAGE HIP Left  5/14/2024    Procedure: TOTAL HIP INCISION AND DRAINAGE, POLY EXCHANGE - LEFT;  Surgeon: Sumanth Durbin MD;  Location:  [facility];  Service: Orthopedics;  Laterality: Left;    TOTAL HIP ARTHROPLASTY Left 4/23/2024    Procedure: TOTAL HIP ARTHROPLASTY ANTERIOR MODIFIED LEFT;  Surgeon: Sumanth Durbin MD;  Location:  [facility];  Service: Orthopedics;  Laterality: Left;    VEIN SURGERY Left     Vein Stripping      Family History   Problem Relation Age of Onset    Cancer Mother     Cancer Father      Social History     Socioeconomic History    Marital status:    Tobacco Use    Smoking status: Former     Types: Cigarettes     Passive exposure: Past    Smokeless tobacco: Former     Types: Chew   Vaping Use    Vaping status: Never Used   Substance and Sexual Activity    Alcohol use: Yes     Comment: occasional    Drug use: No    Sexual activity: Defer      Current Outpatient Medications on File Prior to Visit   Medication Sig Dispense Refill    allopurinol (ZYLOPRIM) 300 MG tablet TAKE 1 TABLET BY MOUTH EVERY DAY 90 tablet 3    amitriptyline (ELAVIL) 25 MG tablet TAKE 1 TABLET BY MOUTH EVERY DAY AT NIGHT 90 tablet 3    aspirin (ASPIR) 81 MG EC tablet Take 1 tablet by mouth 2 (Two) Times a Day. (Patient taking differently: Take 1 tablet by mouth Daily.) 60 tablet 0    Bactrim -160 MG per tablet BACTRIM -160 MG TABS      colchicine 0.6 MG capsule capsule Take 1 capsule by mouth Every 12 (Twelve) Hours.      doxazosin (CARDURA) 4 MG tablet TAKE 1 TABLET BY MOUTH EVERYDAY AT BEDTIME 90 tablet 3    gabapentin (NEURONTIN) 300 MG capsule Take 1 capsule by mouth every night at bedtime. 90 capsule 1    HYDROcodone-acetaminophen (NORCO) 7.5-325 MG per tablet Take 1 tablet by mouth Every 6 (Six) Hours As Needed for Moderate Pain. 60 tablet 0    linezolid (ZYVOX) 600 MG tablet Take 1 tablet by mouth 2 (Two) Times a Day. 20 tablet 0    meclizine (ANTIVERT) 25 MG tablet Take 1 tablet by mouth 3 (Three) Times a Day As  Needed for Dizziness. 15 tablet 0    mesalamine (LIALDA) 1.2 g EC tablet Take 1 tablet by mouth Every 12 (Twelve) Hours.      metoprolol succinate XL (TOPROL-XL) 100 MG 24 hr tablet TAKE 1 TABLET BY MOUTH EVERY DAY 90 tablet 3    omeprazole (priLOSEC) 40 MG capsule TAKE 1 CAPSULE BY MOUTH EVERY DAY 90 capsule 3    temazepam (RESTORIL) 15 MG capsule Take 1 capsule by mouth At Night As Needed for Sleep. 30 capsule 5    valsartan (DIOVAN) 160 MG tablet TAKE 1 TABLET BY MOUTH EVERY DAY 90 tablet 3     No current facility-administered medications on file prior to visit.      Allergies   Allergen Reactions    Nsaids GI Bleeding     colitis    Penicillins Hives     Fever spiked and out for 7days        Review of Systems   Constitutional:  Negative for activity change, appetite change, chills, diaphoresis, fatigue, fever and unexpected weight change.   HENT:  Negative for congestion, dental problem, drooling, ear discharge, ear pain, facial swelling, hearing loss, mouth sores, nosebleeds, postnasal drip, rhinorrhea, sinus pressure, sneezing, sore throat, tinnitus, trouble swallowing and voice change.    Eyes:  Negative for photophobia, pain, discharge, redness, itching and visual disturbance.   Respiratory:  Negative for apnea, cough, choking, chest tightness, shortness of breath, wheezing and stridor.    Cardiovascular:  Negative for chest pain, palpitations and leg swelling.   Gastrointestinal:  Negative for abdominal distention, abdominal pain, anal bleeding, blood in stool, constipation, diarrhea, nausea, rectal pain and vomiting.   Endocrine: Negative for cold intolerance, heat intolerance, polydipsia, polyphagia and polyuria.   Genitourinary:  Negative for decreased urine volume, difficulty urinating, dysuria, enuresis, flank pain, frequency, genital sores, hematuria and urgency.   Musculoskeletal:  Positive for arthralgias. Negative for back pain, gait problem, joint swelling, myalgias, neck pain and neck stiffness.  "  Skin:  Negative for color change, pallor, rash and wound.   Allergic/Immunologic: Negative for environmental allergies, food allergies and immunocompromised state.   Neurological:  Negative for dizziness, tremors, seizures, syncope, facial asymmetry, speech difficulty, weakness, light-headedness, numbness and headaches.   Hematological:  Negative for adenopathy. Does not bruise/bleed easily.   Psychiatric/Behavioral:  Negative for agitation, behavioral problems, confusion, decreased concentration, dysphoric mood, hallucinations, self-injury, sleep disturbance and suicidal ideas. The patient is not nervous/anxious and is not hyperactive.         Objective      Physical Exam  /68   Ht 185.4 cm (72.99\")   Wt 114 kg (251 lb 9.6 oz)   BMI 33.20 kg/m²     Body mass index is 33.2 kg/m².         General:   Mental Status:  Alert   Appearance: Cooperative, in no acute distress   Build and Nutrition: Well-nourished well-developed male   Orientation: Alert and oriented to person, place and time   Posture: Normal   Gait: Nonantalgic    Integument:              Left hip: Wound is well-healed with no signs of infection     Lower Extremity:              Left Hip:                          Tenderness:    None                          Swelling:          None                          Crepitus:          None                          Range of motion:        External Rotation:       30°                                                              Internal Rotation:        30°                                                              Flexion:                       100°                                                              Extension:                   0°                       Deformities:     None  Functional testing: Negative Stinchfield                          No leg length discrepancy    Imaging/Studies  Imaging Results (Last 24 Hours)       Procedure Component Value Units Date/Time    XR Hip With or Without Pelvis " 2 - 3 View Left [[id number]] Resulted: 10/21/24 0849     Updated: 10/21/24 0849    Narrative:      Left Hip Radiographs  Indication: status-post left total hip arthroplasty  Views: low AP pelvis and lateral of the left hip    Comparison: no change compared to prior study, 6/2/2024 and 5/29/2024    Findings:   The components are well aligned, with no signs of loosening or failure.    Heterotopic ossification laterally.            Lab Results   Component Value Date    SEDRATE 28 (H) 08/19/2024    WBC 6.60 08/19/2024    CRP 0.71 (H) 08/19/2024         Assessment and Plan     Diagnoses and all orders for this visit:    1. Status post total hip replacement, left (Primary)  -     XR Hip With or Without Pelvis 2 - 3 View Left    2. Postoperative examination        1. Status post total hip replacement, left    2. Postoperative examination          I reviewed my findings with the patient.  His left total hip arthroplasty is functioning well and he is pleased with results.  No signs of infection.  I will see him back in 6 months, but I will be happy to see him back sooner for any problems.  He sees Dr. Dumont next week.    Return in about 6 months (around 4/21/2025) for recheck with x-rays.      Sumanth Durbin MD  10/21/24  08:56 EDT    Dictated Utilizing Dragon Dictation

## 2024-11-05 ENCOUNTER — LAB (OUTPATIENT)
Dept: LAB | Facility: HOSPITAL | Age: 79
End: 2024-11-05
Payer: MEDICARE

## 2024-11-05 ENCOUNTER — TRANSCRIBE ORDERS (OUTPATIENT)
Dept: LAB | Facility: HOSPITAL | Age: 79
End: 2024-11-05
Payer: MEDICARE

## 2024-11-05 DIAGNOSIS — T84.52XD INFECTION ASSOCIATED WITH INTERNAL LEFT HIP PROSTHESIS, SUBSEQUENT ENCOUNTER: Primary | ICD-10-CM

## 2024-11-05 DIAGNOSIS — L03.116 CELLULITIS OF LEFT FOOT: ICD-10-CM

## 2024-11-05 DIAGNOSIS — L08.89 PITTED KERATOLYSIS: ICD-10-CM

## 2024-11-05 DIAGNOSIS — T84.52XD INFECTION ASSOCIATED WITH INTERNAL LEFT HIP PROSTHESIS, SUBSEQUENT ENCOUNTER: ICD-10-CM

## 2024-11-05 LAB
ALBUMIN SERPL-MCNC: 3.7 G/DL (ref 3.5–5.2)
ALBUMIN/GLOB SERPL: 1 G/DL
ALP SERPL-CCNC: 143 U/L (ref 39–117)
ALT SERPL W P-5'-P-CCNC: 23 U/L (ref 1–41)
ANION GAP SERPL CALCULATED.3IONS-SCNC: 9 MMOL/L (ref 5–15)
AST SERPL-CCNC: 31 U/L (ref 1–40)
BASOPHILS # BLD AUTO: 0.04 10*3/MM3 (ref 0–0.2)
BASOPHILS NFR BLD AUTO: 0.7 % (ref 0–1.5)
BILIRUB SERPL-MCNC: 0.3 MG/DL (ref 0–1.2)
BUN SERPL-MCNC: 14 MG/DL (ref 8–23)
BUN/CREAT SERPL: 10.8 (ref 7–25)
CALCIUM SPEC-SCNC: 8.7 MG/DL (ref 8.6–10.5)
CHLORIDE SERPL-SCNC: 102 MMOL/L (ref 98–107)
CO2 SERPL-SCNC: 24 MMOL/L (ref 22–29)
CREAT SERPL-MCNC: 1.3 MG/DL (ref 0.76–1.27)
CRP SERPL-MCNC: 1.96 MG/DL (ref 0–0.5)
DEPRECATED RDW RBC AUTO: 42 FL (ref 37–54)
EGFRCR SERPLBLD CKD-EPI 2021: 56.2 ML/MIN/1.73
EOSINOPHIL # BLD AUTO: 0.25 10*3/MM3 (ref 0–0.4)
EOSINOPHIL NFR BLD AUTO: 4.2 % (ref 0.3–6.2)
ERYTHROCYTE [DISTWIDTH] IN BLOOD BY AUTOMATED COUNT: 12.7 % (ref 12.3–15.4)
ERYTHROCYTE [SEDIMENTATION RATE] IN BLOOD: 42 MM/HR (ref 0–20)
GLOBULIN UR ELPH-MCNC: 3.7 GM/DL
GLUCOSE SERPL-MCNC: 95 MG/DL (ref 65–99)
HCT VFR BLD AUTO: 38.9 % (ref 37.5–51)
HGB BLD-MCNC: 13 G/DL (ref 13–17.7)
IMM GRANULOCYTES # BLD AUTO: 0.02 10*3/MM3 (ref 0–0.05)
IMM GRANULOCYTES NFR BLD AUTO: 0.3 % (ref 0–0.5)
LYMPHOCYTES # BLD AUTO: 1.22 10*3/MM3 (ref 0.7–3.1)
LYMPHOCYTES NFR BLD AUTO: 20.3 % (ref 19.6–45.3)
MCH RBC QN AUTO: 30.2 PG (ref 26.6–33)
MCHC RBC AUTO-ENTMCNC: 33.4 G/DL (ref 31.5–35.7)
MCV RBC AUTO: 90.3 FL (ref 79–97)
MONOCYTES # BLD AUTO: 0.55 10*3/MM3 (ref 0.1–0.9)
MONOCYTES NFR BLD AUTO: 9.2 % (ref 5–12)
NEUTROPHILS NFR BLD AUTO: 3.93 10*3/MM3 (ref 1.7–7)
NEUTROPHILS NFR BLD AUTO: 65.3 % (ref 42.7–76)
NRBC BLD AUTO-RTO: 0 /100 WBC (ref 0–0.2)
PLATELET # BLD AUTO: 171 10*3/MM3 (ref 140–450)
PMV BLD AUTO: 9.8 FL (ref 6–12)
POTASSIUM SERPL-SCNC: 4.5 MMOL/L (ref 3.5–5.2)
PROT SERPL-MCNC: 7.4 G/DL (ref 6–8.5)
RBC # BLD AUTO: 4.31 10*6/MM3 (ref 4.14–5.8)
SODIUM SERPL-SCNC: 135 MMOL/L (ref 136–145)
WBC NRBC COR # BLD AUTO: 6.01 10*3/MM3 (ref 3.4–10.8)

## 2024-11-05 PROCEDURE — 85652 RBC SED RATE AUTOMATED: CPT

## 2024-11-05 PROCEDURE — 86140 C-REACTIVE PROTEIN: CPT | Performed by: INTERNAL MEDICINE

## 2024-11-05 PROCEDURE — 36415 COLL VENOUS BLD VENIPUNCTURE: CPT

## 2024-11-05 PROCEDURE — 85025 COMPLETE CBC W/AUTO DIFF WBC: CPT

## 2024-11-05 PROCEDURE — 80053 COMPREHEN METABOLIC PANEL: CPT | Performed by: INTERNAL MEDICINE

## 2025-01-24 ENCOUNTER — OFFICE VISIT (OUTPATIENT)
Dept: FAMILY MEDICINE CLINIC | Facility: CLINIC | Age: 80
End: 2025-01-24
Payer: MEDICARE

## 2025-01-24 VITALS
OXYGEN SATURATION: 97 % | DIASTOLIC BLOOD PRESSURE: 68 MMHG | BODY MASS INDEX: 34.59 KG/M2 | HEART RATE: 66 BPM | RESPIRATION RATE: 18 BRPM | SYSTOLIC BLOOD PRESSURE: 132 MMHG | WEIGHT: 261 LBS | HEIGHT: 73 IN | TEMPERATURE: 98 F

## 2025-01-24 DIAGNOSIS — M47.817 LUMBOSACRAL SPONDYLOSIS WITHOUT MYELOPATHY: ICD-10-CM

## 2025-01-24 DIAGNOSIS — I10 ESSENTIAL HYPERTENSION: Primary | ICD-10-CM

## 2025-01-24 DIAGNOSIS — G47.00 INSOMNIA, UNSPECIFIED TYPE: ICD-10-CM

## 2025-01-24 DIAGNOSIS — R60.0 LOCALIZED EDEMA: ICD-10-CM

## 2025-01-24 DIAGNOSIS — G62.9 PERIPHERAL POLYNEUROPATHY: ICD-10-CM

## 2025-01-24 PROCEDURE — 1125F AMNT PAIN NOTED PAIN PRSNT: CPT | Performed by: FAMILY MEDICINE

## 2025-01-24 PROCEDURE — 3078F DIAST BP <80 MM HG: CPT | Performed by: FAMILY MEDICINE

## 2025-01-24 PROCEDURE — 3075F SYST BP GE 130 - 139MM HG: CPT | Performed by: FAMILY MEDICINE

## 2025-01-24 PROCEDURE — 99214 OFFICE O/P EST MOD 30 MIN: CPT | Performed by: FAMILY MEDICINE

## 2025-01-24 RX ORDER — FUROSEMIDE 40 MG/1
TABLET ORAL
Qty: 30 TABLET | Refills: 5 | Status: SHIPPED | OUTPATIENT
Start: 2025-01-24

## 2025-01-24 RX ORDER — HYDROCODONE BITARTRATE AND ACETAMINOPHEN 7.5; 325 MG/1; MG/1
1 TABLET ORAL EVERY 6 HOURS PRN
Qty: 60 TABLET | Refills: 0 | Status: SHIPPED | OUTPATIENT
Start: 2025-01-24 | End: 2025-02-05 | Stop reason: SDUPTHER

## 2025-01-24 RX ORDER — TEMAZEPAM 15 MG/1
15 CAPSULE ORAL NIGHTLY PRN
Qty: 30 CAPSULE | Refills: 5 | Status: SHIPPED | OUTPATIENT
Start: 2025-01-24

## 2025-01-24 RX ORDER — POTASSIUM CHLORIDE 750 MG/1
CAPSULE, EXTENDED RELEASE ORAL
Qty: 60 CAPSULE | Refills: 5 | Status: SHIPPED | OUTPATIENT
Start: 2025-01-24

## 2025-01-24 RX ORDER — GABAPENTIN 300 MG/1
300 CAPSULE ORAL
Qty: 90 CAPSULE | Refills: 1 | Status: SHIPPED | OUTPATIENT
Start: 2025-01-24

## 2025-01-24 RX ORDER — OMEPRAZOLE 40 MG/1
40 CAPSULE, DELAYED RELEASE ORAL DAILY
Qty: 90 CAPSULE | Refills: 3 | Status: SHIPPED | OUTPATIENT
Start: 2025-01-24

## 2025-02-05 DIAGNOSIS — G62.9 PERIPHERAL POLYNEUROPATHY: ICD-10-CM

## 2025-02-05 DIAGNOSIS — M47.817 LUMBOSACRAL SPONDYLOSIS WITHOUT MYELOPATHY: ICD-10-CM

## 2025-02-05 RX ORDER — GABAPENTIN 300 MG/1
300 CAPSULE ORAL
Qty: 90 CAPSULE | Refills: 1 | Status: CANCELLED | OUTPATIENT
Start: 2025-02-05

## 2025-02-05 NOTE — TELEPHONE ENCOUNTER
"Caller: Papito Salinas \"Geoff\"    Relationship: Self    Best call back number: [phone]     Requested Prescriptions:   Requested Prescriptions     Pending Prescriptions Disp Refills    gabapentin (NEURONTIN) 300 MG capsule 90 capsule 1     Sig: Take 1 capsule by mouth every night at bedtime.    HYDROcodone-acetaminophen (NORCO) 7.5-325 MG per tablet 60 tablet 0     Sig: Take 1 tablet by mouth Every 6 (Six) Hours As Needed for Moderate Pain.        Pharmacy where request should be sent: [id number] - [place], [place] - [address] - [phone]  - [phone] FX     Last office visit with prescribing clinician: 1/24/2025   Last telemedicine visit with prescribing clinician: Visit date not found   Next office visit with prescribing clinician: 7/22/2025     Additional details provided by patient: PATIENT STATES THAT, AT HIS VISIT ON 1.24.25, HIS PRESCRIPTIONS WERE SUPPOSED TO BE CALLED INTO THE PHARMACY, BUT THE PHARMACY HAS NOT RECEIVED THEM.  HE HAS 5 PILLS LEFT.    Does the patient have less than a 3 day supply:  [] Yes  [x] No    Would you like a call back once the refill request has been completed: [] Yes [x] No    If the office needs to give you a call back, can they leave a voicemail: [] Yes [x] No    Homero Bruner Rep   02/05/25 11:06 EST         "

## 2025-02-14 RX ORDER — HYDROCODONE BITARTRATE AND ACETAMINOPHEN 7.5; 325 MG/1; MG/1
1 TABLET ORAL EVERY 6 HOURS PRN
Qty: 60 TABLET | Refills: 0 | Status: SHIPPED | OUTPATIENT
Start: 2025-02-14

## 2025-02-14 NOTE — TELEPHONE ENCOUNTER
"CHECKING STATUS OF REFILL REQUEST    Caller: Papito Salinas \"Geoff\"    Relationship: Self    Best call back number: [phone]     What was the call regarding: THE PHARMACY HAS NOT RECEIVED THE REFILLS YET. PLEASE RESEND. NOTIFY PATIENT IF ANY ISSUES.    Is it okay if the provider responds through [employer]: NO, DOES NOT USE IT.  "

## 2025-03-05 ENCOUNTER — TRANSCRIBE ORDERS (OUTPATIENT)
Dept: LAB | Facility: HOSPITAL | Age: 80
End: 2025-03-05
Payer: MEDICARE

## 2025-03-05 ENCOUNTER — LAB (OUTPATIENT)
Dept: LAB | Facility: HOSPITAL | Age: 80
End: 2025-03-05
Payer: MEDICARE

## 2025-03-05 DIAGNOSIS — L03.116 CELLULITIS OF LEFT LOWER EXTREMITY: ICD-10-CM

## 2025-03-05 DIAGNOSIS — L08.89 OTHER SPECIFIED LOCAL INFECTIONS OF THE SKIN AND SUBCUTANEOUS TISSUE: ICD-10-CM

## 2025-03-05 DIAGNOSIS — B96.89 OTHER SPECIFIED BACTERIAL AGENTS AS THE CAUSE OF DISEASES CLASSIFIED ELSEWHERE: ICD-10-CM

## 2025-03-05 DIAGNOSIS — T84.52XD INFECTION AND INFLAMMATORY REACTION DUE TO INTERNAL LEFT HIP PROSTHESIS, SUBSEQUENT ENCOUNTER: ICD-10-CM

## 2025-03-05 DIAGNOSIS — T84.52XD INFECTION AND INFLAMMATORY REACTION DUE TO INTERNAL LEFT HIP PROSTHESIS, SUBSEQUENT ENCOUNTER: Primary | ICD-10-CM

## 2025-03-05 LAB
ALBUMIN SERPL-MCNC: 3.9 G/DL (ref 3.5–5.2)
ALBUMIN/GLOB SERPL: 1.2 G/DL
ALP SERPL-CCNC: 142 U/L (ref 39–117)
ALT SERPL W P-5'-P-CCNC: 20 U/L (ref 1–41)
ANION GAP SERPL CALCULATED.3IONS-SCNC: 11 MMOL/L (ref 5–15)
AST SERPL-CCNC: 28 U/L (ref 1–40)
BASOPHILS # BLD AUTO: 0.03 10*3/MM3 (ref 0–0.2)
BASOPHILS NFR BLD AUTO: 0.6 % (ref 0–1.5)
BILIRUB SERPL-MCNC: 0.4 MG/DL (ref 0–1.2)
BUN SERPL-MCNC: 22 MG/DL (ref 8–23)
BUN/CREAT SERPL: 12.9 (ref 7–25)
CALCIUM SPEC-SCNC: 9 MG/DL (ref 8.6–10.5)
CHLORIDE SERPL-SCNC: 100 MMOL/L (ref 98–107)
CO2 SERPL-SCNC: 25 MMOL/L (ref 22–29)
CREAT SERPL-MCNC: 1.71 MG/DL (ref 0.76–1.27)
CRP SERPL-MCNC: 0.62 MG/DL (ref 0–0.5)
DEPRECATED RDW RBC AUTO: 43.9 FL (ref 37–54)
EGFRCR SERPLBLD CKD-EPI 2021: 40.5 ML/MIN/1.73
EOSINOPHIL # BLD AUTO: 0.2 10*3/MM3 (ref 0–0.4)
EOSINOPHIL NFR BLD AUTO: 3.8 % (ref 0.3–6.2)
ERYTHROCYTE [DISTWIDTH] IN BLOOD BY AUTOMATED COUNT: 13.5 % (ref 12.3–15.4)
ERYTHROCYTE [SEDIMENTATION RATE] IN BLOOD: 17 MM/HR (ref 0–20)
GLOBULIN UR ELPH-MCNC: 3.2 GM/DL
GLUCOSE SERPL-MCNC: 113 MG/DL (ref 65–99)
HCT VFR BLD AUTO: 38.9 % (ref 37.5–51)
HGB BLD-MCNC: 13.1 G/DL (ref 13–17.7)
IMM GRANULOCYTES # BLD AUTO: 0.03 10*3/MM3 (ref 0–0.05)
IMM GRANULOCYTES NFR BLD AUTO: 0.6 % (ref 0–0.5)
LYMPHOCYTES # BLD AUTO: 0.95 10*3/MM3 (ref 0.7–3.1)
LYMPHOCYTES NFR BLD AUTO: 18.1 % (ref 19.6–45.3)
MCH RBC QN AUTO: 30.5 PG (ref 26.6–33)
MCHC RBC AUTO-ENTMCNC: 33.7 G/DL (ref 31.5–35.7)
MCV RBC AUTO: 90.7 FL (ref 79–97)
MONOCYTES # BLD AUTO: 0.37 10*3/MM3 (ref 0.1–0.9)
MONOCYTES NFR BLD AUTO: 7 % (ref 5–12)
NEUTROPHILS NFR BLD AUTO: 3.68 10*3/MM3 (ref 1.7–7)
NEUTROPHILS NFR BLD AUTO: 69.9 % (ref 42.7–76)
NRBC BLD AUTO-RTO: 0 /100 WBC (ref 0–0.2)
PLATELET # BLD AUTO: 129 10*3/MM3 (ref 140–450)
PMV BLD AUTO: 10.7 FL (ref 6–12)
POTASSIUM SERPL-SCNC: 4.5 MMOL/L (ref 3.5–5.2)
PROT SERPL-MCNC: 7.1 G/DL (ref 6–8.5)
RBC # BLD AUTO: 4.29 10*6/MM3 (ref 4.14–5.8)
SODIUM SERPL-SCNC: 136 MMOL/L (ref 136–145)
WBC NRBC COR # BLD AUTO: 5.26 10*3/MM3 (ref 3.4–10.8)

## 2025-03-05 PROCEDURE — 80053 COMPREHEN METABOLIC PANEL: CPT

## 2025-03-05 PROCEDURE — 86140 C-REACTIVE PROTEIN: CPT

## 2025-03-05 PROCEDURE — 85652 RBC SED RATE AUTOMATED: CPT

## 2025-03-05 PROCEDURE — 85025 COMPLETE CBC W/AUTO DIFF WBC: CPT

## 2025-03-05 PROCEDURE — 36415 COLL VENOUS BLD VENIPUNCTURE: CPT

## 2025-03-10 NOTE — PROGRESS NOTES
Subjective   Papito Salinas is a 79 y.o. male    Chief Complaint    Hypertension  Peripheral neuropathy  Insomnia  Lumbar spondylosis  Ankle swelling      History of Present Illness  The patient is a 79-year-old male who presents today for follow-up regarding hypertension, peripheral polyneuropathy, insomnia, lumbar spondylosis, and a new complaint of bilateral swelling in the ankles and feet for several weeks, making his ambulation difficult. He is due for refills on his amitriptyline, gabapentin, hydrocodone, Restoril, and omeprazole.    He reports persistent bilateral ankle swelling, which has not subsided as it typically does. He is not experiencing any respiratory distress. He is uncertain about the use of diuretics in his treatment regimen. He has been using compression socks, which provide some relief, but his mobility remains limited. He also reports numbness in the soles of his feet. His physical activity has been significantly reduced, with weight gain from 260 to 250 pounds. He has not engaged in golf for the past 2 years due to pre-surgical limitations in walking uphill to reach the green, which was exacerbated by severe pain. Post-surgery, he developed an infection that further restricted his activities. He has a scheduled appointment with the infectious disease specialist in March 2024, who may discontinue his antibiotic therapy.    He was advised to discontinue valsartan due to potential interactions with his antibiotic, which resulted in dizziness and a few falls. Since stopping valsartan, he has not experienced these symptoms.    MEDICATIONS      The following portions of the patient's history were reviewed and updated as appropriate: allergies, current medications, past social history and problem list    Review of Systems   Constitutional: Negative.  Negative for appetite change, fatigue and unexpected weight change.   Respiratory: Negative.  Negative for cough, chest tightness and shortness  of breath.    Cardiovascular:  Positive for leg swelling. Negative for chest pain and palpitations.   Gastrointestinal: Negative.  Negative for abdominal distention, abdominal pain, diarrhea and nausea.        Patient experiencing heartburn/acid reflux     Musculoskeletal:  Positive for back pain. Negative for arthralgias, gait problem and myalgias.   Skin:  Negative for color change and rash.   Neurological:  Negative for dizziness, tremors, syncope, weakness, light-headedness, numbness and headaches.   Psychiatric/Behavioral:  Positive for sleep disturbance. Negative for agitation, behavioral problems, confusion, decreased concentration, dysphoric mood and hallucinations. The patient is not nervous/anxious and is not hyperactive.        Objective     Vitals:    01/24/25 1118   BP: 132/68   Pulse: 66   Resp: 18   Temp: 98 °F (36.7 °C)   SpO2: 97%       Physical Exam  Vitals and nursing note reviewed.   Constitutional:       General: He is not in acute distress.     Appearance: Normal appearance. He is well-developed. He is not ill-appearing, toxic-appearing or diaphoretic.   HENT:      Head: Normocephalic and atraumatic.   Eyes:      General: No scleral icterus.     Conjunctiva/sclera: Conjunctivae normal.      Pupils: Pupils are equal, round, and reactive to light.   Neck:      Vascular: No carotid bruit or JVD.   Cardiovascular:      Rate and Rhythm: Normal rate and regular rhythm.      Pulses: Normal pulses.      Heart sounds: Normal heart sounds. No murmur heard.  Pulmonary:      Effort: Pulmonary effort is normal. No respiratory distress.      Breath sounds: Normal breath sounds. No wheezing or rales.   Abdominal:      General: Bowel sounds are normal. There is no distension.      Palpations: Abdomen is soft. There is no mass.      Tenderness: There is no abdominal tenderness. There is no guarding or rebound.      Hernia: No hernia is present.   Musculoskeletal:      Lumbar back: Tenderness and bony tenderness  present. No swelling, deformity or spasms. Decreased range of motion.      Right lower leg: Edema present.      Left lower leg: Edema present.   Skin:     General: Skin is warm and dry.      Coloration: Skin is not jaundiced or pale.   Neurological:      Mental Status: He is alert and oriented to person, place, and time.      Deep Tendon Reflexes: Reflexes are normal and symmetric.   Psychiatric:         Mood and Affect: Mood normal.         Behavior: Behavior normal.       Physical Exam  Lungs are clear.    Vital Signs  Blood pressure is normal.    Assessment & Plan   Assessment & Plan  1. Bilateral ankle and foot edema.  His lung sounds are clear, indicating that the edema is not cardiac in origin. The possibility of nocturnal hypertension contributing to the fluid retention can not be ruled out. A prescription for a diuretic, to be taken in conjunction with a potassium supplement, will be provided. He is advised to take these medications for a duration of 3 to 4 days, after which the edema should resolve. If the edema recurs, he is instructed to resume the medication. A handicap parking sticker has been issued for his convenience.    2. Hypertension.  His blood pressure readings are within normal range today. He is currently not taking valsartan due to previous dizziness and falls, which resolved after discontinuation. He will continue to monitor his blood pressure and avoid valsartan unless otherwise directed by a healthcare professional.    3. Peripheral polyneuropathy.  He reports numbness in the bottom of his feet, which is exacerbated by the swelling. He will continue his current medications, including amitriptyline and gabapentin. Refills for these medications have been sent to the pharmacy.    4. Insomnia.  He will continue his current medication, Restoril. A refill for Restoril has been sent to the pharmacy.    5. Lumbar spondylosis.  He will continue his current treatment regimen, including hydrocodone  for pain management. Refills for hydrocodone have been sent to the pharmacy.    6. Gastroesophageal reflux disease (GERD).  He will continue his current medication, omeprazole. A refill for omeprazole has been sent to the pharmacy.    Follow-up  The patient will follow up in 6 months.    Problems Addressed this Visit          Neuro    Lumbosacral spondylosis without myelopathy    Relevant Medications    gabapentin (NEURONTIN) 300 MG capsule     Other Visit Diagnoses         Essential hypertension    -  Primary    Relevant Medications    furosemide (Lasix) 40 MG tablet      Peripheral polyneuropathy        Relevant Medications    gabapentin (NEURONTIN) 300 MG capsule      Insomnia, unspecified type        Relevant Medications    temazepam (RESTORIL) 15 MG capsule      Localized edema              Diagnoses         Codes Comments      Essential hypertension    -  Primary ICD-10-CM: I10  ICD-9-CM: 401.9       Peripheral polyneuropathy     ICD-10-CM: G62.9  ICD-9-CM: 356.9       Insomnia, unspecified type     ICD-10-CM: G47.00  ICD-9-CM: 780.52       Lumbosacral spondylosis without myelopathy     ICD-10-CM: M47.817  ICD-9-CM: 721.3       Localized edema     ICD-10-CM: R60.0  ICD-9-CM: 782.3           I spent 35 minutes in patient care: Reviewing records prior to the visit, examining the patient, entering orders and documentation    Part of this note may be an electronic transcription/translation of spoken language to printed text using the Dragon Dictation System.           Detail Level: Detailed Cpt Desired: 03207 Showing: demodex Koh Intro Text (From The.....): A KOH prep was ordered and evaluated from the Koh Procedure Text (Tissue Harvesting Technique): A 15-blade scalpel was used to scrape the skin. The skin scrapings were placed on a glass slide, covered with a coverslip and a KOH solution was applied.

## 2025-03-17 DIAGNOSIS — M47.817 LUMBOSACRAL SPONDYLOSIS WITHOUT MYELOPATHY: ICD-10-CM

## 2025-03-17 RX ORDER — HYDROCODONE BITARTRATE AND ACETAMINOPHEN 7.5; 325 MG/1; MG/1
1 TABLET ORAL EVERY 6 HOURS PRN
Qty: 60 TABLET | Refills: 0 | Status: SHIPPED | OUTPATIENT
Start: 2025-03-17

## 2025-03-17 NOTE — TELEPHONE ENCOUNTER
"Caller: Papito Salinas \"Geoff\"    Relationship: Self    Best call back number: [phone]     Requested Prescriptions:   Requested Prescriptions     Pending Prescriptions Disp Refills    HYDROcodone-acetaminophen (NORCO) 7.5-325 MG per tablet 60 tablet 0     Sig: Take 1 tablet by mouth Every 6 (Six) Hours As Needed for Moderate Pain.        Pharmacy where request should be sent: [id number] - [place], [place] - [address] - [phone]  - [phone] FX     Last office visit with prescribing clinician: 1/24/2025   Last telemedicine visit with prescribing clinician: Visit date not found   Next office visit with prescribing clinician: 7/22/2025     Additional details provided by patient: PATIENT NEEDS A REFILL . WILL NEED A PRIOR AUTHORIZATION FROM INSURANCE    Does the patient have less than a 3 day supply:  [] Yes  [x] No    Would you like a call back once the refill request has been completed: [] Yes [x] No    If the office needs to give you a call back, can they leave a voicemail: [] Yes [x] No    Homero Alvarado Rep   03/17/25 11:50 EDT         "

## 2025-04-02 ENCOUNTER — TRANSCRIBE ORDERS (OUTPATIENT)
Dept: LAB | Facility: HOSPITAL | Age: 80
End: 2025-04-02
Payer: MEDICARE

## 2025-04-02 ENCOUNTER — LAB (OUTPATIENT)
Dept: LAB | Facility: HOSPITAL | Age: 80
End: 2025-04-02
Payer: MEDICARE

## 2025-04-02 DIAGNOSIS — N18.2 CHRONIC KIDNEY DISEASE, STAGE II (MILD): ICD-10-CM

## 2025-04-02 DIAGNOSIS — N18.2 CHRONIC KIDNEY DISEASE, STAGE II (MILD): Primary | ICD-10-CM

## 2025-04-02 LAB
ANION GAP SERPL CALCULATED.3IONS-SCNC: 11 MMOL/L (ref 5–15)
BUN SERPL-MCNC: 17 MG/DL (ref 8–23)
BUN/CREAT SERPL: 11.2 (ref 7–25)
CALCIUM SPEC-SCNC: 8.8 MG/DL (ref 8.6–10.5)
CHLORIDE SERPL-SCNC: 100 MMOL/L (ref 98–107)
CO2 SERPL-SCNC: 24 MMOL/L (ref 22–29)
CREAT SERPL-MCNC: 1.52 MG/DL (ref 0.76–1.27)
EGFRCR SERPLBLD CKD-EPI 2021: 46.6 ML/MIN/1.73
GLUCOSE SERPL-MCNC: 91 MG/DL (ref 65–99)
POTASSIUM SERPL-SCNC: 5.2 MMOL/L (ref 3.5–5.2)
SODIUM SERPL-SCNC: 135 MMOL/L (ref 136–145)

## 2025-04-02 PROCEDURE — 80048 BASIC METABOLIC PNL TOTAL CA: CPT

## 2025-04-02 PROCEDURE — 36415 COLL VENOUS BLD VENIPUNCTURE: CPT

## 2025-04-04 ENCOUNTER — TELEPHONE (OUTPATIENT)
Dept: FAMILY MEDICINE CLINIC | Facility: CLINIC | Age: 80
End: 2025-04-04
Payer: MEDICARE

## 2025-04-04 DIAGNOSIS — E87.5 HYPERKALEMIA: Primary | ICD-10-CM

## 2025-04-04 NOTE — TELEPHONE ENCOUNTER
Suspect the elevated potassium is related to the hemolysis noted on the report.  I would recommend that we repeat his test on Wednesday next week I will place an order.

## 2025-04-04 NOTE — TELEPHONE ENCOUNTER
SOY FROM DR GONCALVES'S OFFICE CALLED TO REPORT PATIENT'S POTASSIUM WAS ELEVATED YESTERDAY. SHE SAID PATIENT WAS ALSO GOING TO CALL THE OFFICE.     [phone]

## 2025-04-04 NOTE — TELEPHONE ENCOUNTER
Called Jeannette, office is closed. [employer] [facility]  picked up. Called Mr. Salinas, went to , left detailed vm. Will try to call again on Monday.

## 2025-04-09 ENCOUNTER — LAB (OUTPATIENT)
Dept: LAB | Facility: HOSPITAL | Age: 80
End: 2025-04-09
Payer: MEDICARE

## 2025-04-09 DIAGNOSIS — E87.5 HYPERKALEMIA: ICD-10-CM

## 2025-04-09 LAB
ANION GAP SERPL CALCULATED.3IONS-SCNC: 16.4 MMOL/L (ref 5–15)
BASOPHILS # BLD AUTO: 0.02 10*3/MM3 (ref 0–0.2)
BASOPHILS NFR BLD AUTO: 0.3 % (ref 0–1.5)
BUN SERPL-MCNC: 17 MG/DL (ref 8–23)
BUN/CREAT SERPL: 9.5 (ref 7–25)
CALCIUM SPEC-SCNC: 8.9 MG/DL (ref 8.6–10.5)
CHLORIDE SERPL-SCNC: 101 MMOL/L (ref 98–107)
CO2 SERPL-SCNC: 23.6 MMOL/L (ref 22–29)
CREAT SERPL-MCNC: 1.79 MG/DL (ref 0.76–1.27)
CRP SERPL-MCNC: 1.08 MG/DL (ref 0–0.5)
DEPRECATED RDW RBC AUTO: 49.4 FL (ref 37–54)
EGFRCR SERPLBLD CKD-EPI 2021: 38.3 ML/MIN/1.73
EOSINOPHIL # BLD AUTO: 0.25 10*3/MM3 (ref 0–0.4)
EOSINOPHIL NFR BLD AUTO: 4.3 % (ref 0.3–6.2)
ERYTHROCYTE [DISTWIDTH] IN BLOOD BY AUTOMATED COUNT: 14.8 % (ref 12.3–15.4)
GLUCOSE SERPL-MCNC: 98 MG/DL (ref 65–99)
HCT VFR BLD AUTO: 40.2 % (ref 37.5–51)
HGB BLD-MCNC: 13.5 G/DL (ref 13–17.7)
IMM GRANULOCYTES # BLD AUTO: 0.02 10*3/MM3 (ref 0–0.05)
IMM GRANULOCYTES NFR BLD AUTO: 0.3 % (ref 0–0.5)
LYMPHOCYTES # BLD AUTO: 1.02 10*3/MM3 (ref 0.7–3.1)
LYMPHOCYTES NFR BLD AUTO: 17.5 % (ref 19.6–45.3)
MCH RBC QN AUTO: 31.1 PG (ref 26.6–33)
MCHC RBC AUTO-ENTMCNC: 33.6 G/DL (ref 31.5–35.7)
MCV RBC AUTO: 92.6 FL (ref 79–97)
MONOCYTES # BLD AUTO: 0.37 10*3/MM3 (ref 0.1–0.9)
MONOCYTES NFR BLD AUTO: 6.4 % (ref 5–12)
NEUTROPHILS NFR BLD AUTO: 4.14 10*3/MM3 (ref 1.7–7)
NEUTROPHILS NFR BLD AUTO: 71.2 % (ref 42.7–76)
NRBC BLD AUTO-RTO: 0 /100 WBC (ref 0–0.2)
PLATELET # BLD AUTO: 160 10*3/MM3 (ref 140–450)
PMV BLD AUTO: 10.6 FL (ref 6–12)
POTASSIUM SERPL-SCNC: 4.5 MMOL/L (ref 3.5–5.2)
RBC # BLD AUTO: 4.34 10*6/MM3 (ref 4.14–5.8)
SODIUM SERPL-SCNC: 141 MMOL/L (ref 136–145)
WBC NRBC COR # BLD AUTO: 5.82 10*3/MM3 (ref 3.4–10.8)

## 2025-04-09 PROCEDURE — 36415 COLL VENOUS BLD VENIPUNCTURE: CPT

## 2025-04-09 PROCEDURE — 80048 BASIC METABOLIC PNL TOTAL CA: CPT

## 2025-04-21 ENCOUNTER — OFFICE VISIT (OUTPATIENT)
Dept: ORTHOPEDIC SURGERY | Facility: CLINIC | Age: 80
End: 2025-04-21
Payer: MEDICARE

## 2025-04-21 VITALS
BODY MASS INDEX: 34.7 KG/M2 | HEIGHT: 73 IN | DIASTOLIC BLOOD PRESSURE: 64 MMHG | WEIGHT: 261.8 LBS | SYSTOLIC BLOOD PRESSURE: 126 MMHG

## 2025-04-21 DIAGNOSIS — Z96.642 STATUS POST TOTAL HIP REPLACEMENT, LEFT: Primary | ICD-10-CM

## 2025-04-21 DIAGNOSIS — Z09 POSTOPERATIVE EXAMINATION: ICD-10-CM

## 2025-04-21 PROCEDURE — 1159F MED LIST DOCD IN RCRD: CPT | Performed by: ORTHOPAEDIC SURGERY

## 2025-04-21 PROCEDURE — 3078F DIAST BP <80 MM HG: CPT | Performed by: ORTHOPAEDIC SURGERY

## 2025-04-21 PROCEDURE — 99212 OFFICE O/P EST SF 10 MIN: CPT | Performed by: ORTHOPAEDIC SURGERY

## 2025-04-21 PROCEDURE — 3074F SYST BP LT 130 MM HG: CPT | Performed by: ORTHOPAEDIC SURGERY

## 2025-04-21 PROCEDURE — 1160F RVW MEDS BY RX/DR IN RCRD: CPT | Performed by: ORTHOPAEDIC SURGERY

## 2025-04-21 NOTE — PROGRESS NOTES
[facility] [place] [facility]opaedic Surgery Clinic Note    Subjective     Chief Complaint   Patient presents with    Follow-up     6 month follow up -- S/P Left NAVNEET (4/23/24)        HPI    It has been 6  month(s) since Mr. Salinas's last visit. He returns to clinic today for postoperative follow-up of left hip arthroplasty. The issue has been ongoing for 1 year(s). He rates his pain a 0/10 on the pain scale. Previous/current treatments: physical therapy. Current symptoms: . Overall, he is doing better.  100% improvement compared to his preoperative symptoms.  Fully ambulatory without external aids.  He continues on Bactrim once a day for his Serratia infection.  He is under the care of Dr. Dumont.      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    Status post total hip replacement, left    S/P debridement left hip wound with head and liner exchange     Past Medical History:   Diagnosis Date    Arthritis     Edema     Lower extremities    GERD (gastroesophageal reflux disease)     History of transfusion     Hypertension     Insomnia     Plantar fascial fibromatosis     Tibia fracture     Left      Past Surgical History:   Procedure Laterality Date    CATARACT EXTRACTION Bilateral     COLONOSCOPY      INCISION AND DRAINAGE HIP Left 5/14/2024    Procedure: TOTAL HIP INCISION AND DRAINAGE, POLY EXCHANGE - LEFT;  Surgeon: Sumanth Durbin MD;  Location: [facility]  OR;  Service: Orthopedics;  Laterality: Left;    TOTAL HIP ARTHROPLASTY Left 4/23/2024    Procedure: TOTAL HIP ARTHROPLASTY ANTERIOR MODIFIED LEFT;  Surgeon: Sumanth Durbin MD;  Location: [facility] OR;  Service: Orthopedics;  Laterality: Left;    VEIN SURGERY Left     Vein Stripping      Family History   Problem Relation Age of Onset    Cancer Mother     Cancer Father      Social History     Socioeconomic History    Marital status:    Tobacco Use    Smoking status: Former     Types: Cigarettes     Passive exposure: Past    Smokeless tobacco: Former     Types: Chew   Vaping Use    Vaping status: Never Used   Substance and Sexual Activity    Alcohol use: Yes     Comment: occasional    Drug use: No    Sexual activity: Defer      Current Outpatient Medications on File Prior to Visit   Medication Sig Dispense Refill    allopurinol (ZYLOPRIM) 300 MG tablet TAKE 1 TABLET BY MOUTH EVERY DAY 90 tablet 3    amitriptyline (ELAVIL) 25 MG tablet Take 1 tablet by mouth every night at bedtime. 90 tablet 3    aspirin (ASPIR) 81 MG EC tablet Take 1 tablet by mouth 2 (Two) Times a Day. (Patient taking differently: Take 1 tablet by mouth Daily.) 60 tablet 0    Bactrim -160 MG per tablet BACTRIM -160 MG TABS      colchicine 0.6 MG capsule capsule Take 1 capsule by mouth Every 12 (Twelve) Hours.      doxazosin (CARDURA) 4 MG tablet TAKE 1 TABLET BY MOUTH EVERYDAY AT BEDTIME 90 tablet 3    furosemide (Lasix) 40 MG tablet 1 tablet by mouth each morning as needed for swelling 30 tablet 5    gabapentin (NEURONTIN) 300 MG capsule Take 1 capsule by mouth every night at bedtime. 90 capsule 1    HYDROcodone-acetaminophen (NORCO) 7.5-325 MG per tablet Take 1 tablet by mouth Every 6 (Six) Hours As Needed for Moderate Pain. 60 tablet 0    linezolid (ZYVOX) 600 MG tablet Take 1 tablet by mouth 2 (Two) Times a Day. 20 tablet 0    meclizine (ANTIVERT) 25 MG tablet Take 1 tablet by mouth 3 (Three) Times a Day As Needed for Dizziness.  15 tablet 0    mesalamine (LIALDA) 1.2 g EC tablet Take 1 tablet by mouth Every 12 (Twelve) Hours.      metoprolol succinate XL (TOPROL-XL) 100 MG 24 hr tablet TAKE 1 TABLET BY MOUTH EVERY DAY 90 tablet 3    omeprazole (priLOSEC) 40 MG capsule Take 1 capsule by mouth Daily. 90 capsule 3    potassium chloride (MICRO-K) 10 MEQ CR capsule 2 capsules by mouth each morning with Lasix (furosemide) 60 capsule 5    temazepam (RESTORIL) 15 MG capsule Take 1 capsule by mouth At Night As Needed for Sleep. 30 capsule 5     No current facility-administered medications on file prior to visit.      Allergies   Allergen Reactions    Nsaids GI Bleeding     colitis    Penicillins Hives     Fever spiked and out for 7days        Review of Systems   Constitutional:  Negative for activity change, appetite change, chills, diaphoresis, fatigue, fever and unexpected weight change.   HENT:  Negative for congestion, dental problem, drooling, ear discharge, ear pain, facial swelling, hearing loss, mouth sores, nosebleeds, postnasal drip, rhinorrhea, sinus pressure, sneezing, sore throat, tinnitus, trouble swallowing and voice change.    Eyes:  Negative for photophobia, pain, discharge, redness, itching and visual disturbance.   Respiratory:  Negative for apnea, cough, choking, chest tightness, shortness of breath, wheezing and stridor.    Cardiovascular:  Negative for chest pain, palpitations and leg swelling.   Gastrointestinal:  Negative for abdominal distention, abdominal pain, anal bleeding, blood in stool, constipation, diarrhea, nausea, rectal pain and vomiting.   Endocrine: Negative for cold intolerance, heat intolerance, polydipsia, polyphagia and polyuria.   Genitourinary:  Negative for decreased urine volume, difficulty urinating, dysuria, enuresis, flank pain, frequency, genital sores, hematuria and urgency.   Musculoskeletal:  Positive for arthralgias. Negative for back pain, gait problem, joint swelling, myalgias, neck pain and  "neck stiffness.   Skin:  Negative for color change, pallor, rash and wound.   Allergic/Immunologic: Negative for environmental allergies, food allergies and immunocompromised state.   Neurological:  Negative for dizziness, tremors, seizures, syncope, facial asymmetry, speech difficulty, weakness, light-headedness, numbness and headaches.   Hematological:  Negative for adenopathy. Does not bruise/bleed easily.   Psychiatric/Behavioral:  Negative for agitation, behavioral problems, confusion, decreased concentration, dysphoric mood, hallucinations, self-injury, sleep disturbance and suicidal ideas. The patient is not nervous/anxious and is not hyperactive.         Objective      Physical Exam  /64   Ht 185.4 cm (73\")   Wt 119 kg (261 lb 12.8 oz)   BMI 34.54 kg/m²     Body mass index is 34.54 kg/m².         General:   Mental Status:  Alert   Appearance: Cooperative, in no acute distress   Orientation: Alert and oriented to person, place and time   Posture: Normal   Gait: Nonantalgic/normal    Integument:   Left hip: Wound is well-healed with no signs of infection    Lower Extremity:   Left Hip:    Tenderness:  None    Swelling:  None    Crepitus:  None    Range of motion:  External Rotation: 30°       Internal Rotation: 30°       Flexion:  100°       Extension:  0°    Deformities:  None  Functional testing: Negative Stinchfield    No leg length discrepancy      Imaging/Studies  Imaging Results (Last 24 Hours)       Procedure Component Value Units Date/Time    XR Hip With or Without Pelvis 2 - 3 View Left [[id number]] Resulted: 04/21/25 0919     Updated: 04/21/25 0919    Narrative:      Left Hip Radiographs  Indication: status-post left total hip arthroplasty  Views: low AP pelvis and lateral of the left hip    Comparison: 10/21/2024    Findings:   No change in component alignment, with heterotopic ossification seen,   unchanged from previous imaging, with no other unusual features.  Old   healed femoral " fracture below the stem noted.              Assessment and Plan     Diagnoses and all orders for this visit:    1. Status post total hip replacement, left (Primary)  -     XR Hip With or Without Pelvis 2 - 3 View Left    2. Postoperative examination        1. Status post total hip replacement, left    2. Postoperative examination          I reviewed my findings with the patient.  His left total hip arthroplasty is functioning well and he is pleased with results.  I will see him back in a year for recheck.  He has an appointment with Dr. Dumont in June.    Return in about 1 year (around 4/21/2026) for recheck with x-rays.      Sumanth Durbin MD  04/21/25  09:28 EDT    Dictated Utilizing Dragon Dictation

## 2025-05-21 NOTE — MR AVS SNAPSHOT
Papito Salinas   1/4/2017 11:30 AM   Office Visit    Dept Phone:  [phone]   Encounter #:  [id number]    Provider:  Dick Brewster MD   Department:  [facility]                Your Full Care Plan              Where to Get Your Medications      You can get these medications from any pharmacy     Bring a paper prescription for each of these medications     HYDROcodone-acetaminophen 7.5-325 MG per tablet    temazepam 15 MG capsule            Your Updated Medication List          This list is accurate as of: 1/4/17 12:09 PM.  Always use your most recent med list.                allopurinol 300 MG tablet   Commonly known as:  ZYLOPRIM   Take 1 tablet by mouth daily.       doxazosin 4 MG tablet   Commonly known as:  CARDURA   Take 1 tablet by mouth every night.       FLUZONE HIGH-DOSE 0.5 ML suspension prefilled syringe injection   Generic drug:  influenza vac split high-dose       HYDROcodone-acetaminophen 7.5-325 MG per tablet   Commonly known as:  NORCO   Take 1 tablet by mouth Every 6 (Six) Hours As Needed for moderate pain (4-6).       LIALDA 1.2 G EC tablet   Generic drug:  mesalamine       losartan 100 MG tablet   Commonly known as:  COZAAR   Take 1 tablet by mouth daily.       metoprolol succinate  MG 24 hr tablet   Commonly known as:  TOPROL-XL   Take 1 tablet by mouth daily.       omeprazole 40 MG capsule   Commonly known as:  priLOSEC   Take 1 capsule by mouth daily.       temazepam 15 MG capsule   Commonly known as:  RESTORIL   Take 1 capsule by mouth At Night As Needed for sleep.               We Performed the Following     FAYE     C-reactive Protein     CBC & Differential     Comprehensive Metabolic Panel     Lipid Panel     Rheumatoid Factor     Sedimentation Rate     Uric Acid       You Were Diagnosed With        Codes Comments    Mixed hyperlipidemia    -  Primary ICD-10-CM: E78.2  ICD-9-CM: 272.2     Essential  hypertension     ICD-10-CM: I10  ICD-9-CM: 401.9     Idiopathic gout, unspecified chronicity, unspecified site     ICD-10-CM: M10.00  ICD-9-CM: 274.9     Primary osteoarthritis involving multiple joints     ICD-10-CM: M15.0  ICD-9-CM: 715.09     Chronic bilateral low back pain with right-sided sciatica     ICD-10-CM: M54.41, G89.29  ICD-9-CM: 724.2, 724.3, 338.29     Myalgia     ICD-10-CM: M79.1  ICD-9-CM: 729.1     Arthralgia, unspecified joint     ICD-10-CM: M25.50  ICD-9-CM: 719.40       Instructions     None    Patient Instructions History      Upcoming Appointments     Visit Type Date Time Department    OFFICE VISIT 2017 11:30 AM [facility] Signup     [facility] allows you to send messages to your doctor, view your test results, renew your prescriptions, schedule appointments, and more. To sign up, go to [employer] and click on the Sign Up Now link in the New User? box. Enter your [facility] Activation Code exactly as it appears below along with the last four digits of your Social Security Number and your Date of Birth () to complete the sign-up process. If you do not sign up before the expiration date, you must request a new code.    [facility] Activation Code: [email or web address]  Expires: 2017 12:09 PM    If you have questions, you can email [email or web address] or call [phone] to talk to our [facility] staff. Remember, [facility] is NOT to be used for urgent needs. For medical emergencies, dial 911.               Other Info from Your Visit           Allergies     Penicillins  Other (See Comments)    Fever spiked and out for 7days      Reason for Visit     Arthritis Wants to know if you would consider prednisone, refills on allopurinol     Gout comes and goes, takes allopurinol daily    Joint Swelling R. elbow hurts to bend, upper arm is very weak,      Foot Swelling L. ankle has been swelling and goes down in the morning    Insomnia temazepam not  "working well    Hypertension refills on doxazosin, losartan, metoprolol    Back Pain refills on norco      Vital Signs     Blood Pressure Pulse Temperature Height Weight Oxygen Saturation    122/82 72 98.4 °F (36.9 °C) 73\" (185.4 cm) 247 lb (112 kg) 98%    Body Mass Index Smoking Status                32.59 kg/m2 Current Every Day Smoker          Problems and Diagnoses Noted     Gout    Mixed hyperlipidemia    -  Primary    High blood pressure        Primary osteoarthritis involving multiple joints        Chronic bilateral low back pain with right-sided sciatica        Myalgia        Joint pain            " Rodríguez Marin(Attending)

## 2025-06-04 ENCOUNTER — TRANSCRIBE ORDERS (OUTPATIENT)
Dept: LAB | Facility: HOSPITAL | Age: 80
End: 2025-06-04
Payer: MEDICARE

## 2025-06-04 ENCOUNTER — LAB (OUTPATIENT)
Dept: LAB | Facility: HOSPITAL | Age: 80
End: 2025-06-04
Payer: MEDICARE

## 2025-06-04 DIAGNOSIS — T84.52XD INFECTION ASSOCIATED WITH INTERNAL LEFT HIP PROSTHESIS, SUBSEQUENT ENCOUNTER: ICD-10-CM

## 2025-06-04 DIAGNOSIS — T84.52XD INFECTION ASSOCIATED WITH INTERNAL LEFT HIP PROSTHESIS, SUBSEQUENT ENCOUNTER: Primary | ICD-10-CM

## 2025-06-04 LAB
ALBUMIN SERPL-MCNC: 4.1 G/DL (ref 3.5–5.2)
ALBUMIN/GLOB SERPL: 1.3 G/DL
ALP SERPL-CCNC: 136 U/L (ref 39–117)
ALT SERPL W P-5'-P-CCNC: 23 U/L (ref 1–41)
ANION GAP SERPL CALCULATED.3IONS-SCNC: 11 MMOL/L (ref 5–15)
AST SERPL-CCNC: 36 U/L (ref 1–40)
BASOPHILS # BLD AUTO: 0.03 10*3/MM3 (ref 0–0.2)
BASOPHILS NFR BLD AUTO: 0.7 % (ref 0–1.5)
BILIRUB SERPL-MCNC: 0.7 MG/DL (ref 0–1.2)
BUN SERPL-MCNC: 18.7 MG/DL (ref 8–23)
BUN/CREAT SERPL: 11.2 (ref 7–25)
CALCIUM SPEC-SCNC: 9.1 MG/DL (ref 8.6–10.5)
CHLORIDE SERPL-SCNC: 100 MMOL/L (ref 98–107)
CO2 SERPL-SCNC: 25 MMOL/L (ref 22–29)
CREAT SERPL-MCNC: 1.67 MG/DL (ref 0.76–1.27)
CRP SERPL-MCNC: 0.65 MG/DL (ref 0–0.5)
DEPRECATED RDW RBC AUTO: 49.4 FL (ref 37–54)
EGFRCR SERPLBLD CKD-EPI 2021: 41.6 ML/MIN/1.73
EOSINOPHIL # BLD AUTO: 0.14 10*3/MM3 (ref 0–0.4)
EOSINOPHIL NFR BLD AUTO: 3.3 % (ref 0.3–6.2)
ERYTHROCYTE [DISTWIDTH] IN BLOOD BY AUTOMATED COUNT: 14 % (ref 12.3–15.4)
ERYTHROCYTE [SEDIMENTATION RATE] IN BLOOD: 31 MM/HR (ref 0–20)
GLOBULIN UR ELPH-MCNC: 3.2 GM/DL
GLUCOSE SERPL-MCNC: 94 MG/DL (ref 65–99)
HCT VFR BLD AUTO: 40.7 % (ref 37.5–51)
HGB BLD-MCNC: 13.4 G/DL (ref 13–17.7)
IMM GRANULOCYTES # BLD AUTO: 0.06 10*3/MM3 (ref 0–0.05)
IMM GRANULOCYTES NFR BLD AUTO: 1.4 % (ref 0–0.5)
LYMPHOCYTES # BLD AUTO: 0.75 10*3/MM3 (ref 0.7–3.1)
LYMPHOCYTES NFR BLD AUTO: 17.6 % (ref 19.6–45.3)
MCH RBC QN AUTO: 31.6 PG (ref 26.6–33)
MCHC RBC AUTO-ENTMCNC: 32.9 G/DL (ref 31.5–35.7)
MCV RBC AUTO: 96 FL (ref 79–97)
MONOCYTES # BLD AUTO: 0.34 10*3/MM3 (ref 0.1–0.9)
MONOCYTES NFR BLD AUTO: 8 % (ref 5–12)
NEUTROPHILS NFR BLD AUTO: 2.95 10*3/MM3 (ref 1.7–7)
NEUTROPHILS NFR BLD AUTO: 69 % (ref 42.7–76)
NRBC BLD AUTO-RTO: 0 /100 WBC (ref 0–0.2)
PLATELET # BLD AUTO: 144 10*3/MM3 (ref 140–450)
PMV BLD AUTO: 10.9 FL (ref 6–12)
POTASSIUM SERPL-SCNC: 4.9 MMOL/L (ref 3.5–5.2)
PROT SERPL-MCNC: 7.3 G/DL (ref 6–8.5)
RBC # BLD AUTO: 4.24 10*6/MM3 (ref 4.14–5.8)
SODIUM SERPL-SCNC: 136 MMOL/L (ref 136–145)
WBC NRBC COR # BLD AUTO: 4.27 10*3/MM3 (ref 3.4–10.8)

## 2025-06-04 PROCEDURE — 85652 RBC SED RATE AUTOMATED: CPT

## 2025-06-04 PROCEDURE — 85025 COMPLETE CBC W/AUTO DIFF WBC: CPT

## 2025-06-04 PROCEDURE — 36415 COLL VENOUS BLD VENIPUNCTURE: CPT

## 2025-06-04 PROCEDURE — 80053 COMPREHEN METABOLIC PANEL: CPT

## 2025-06-04 PROCEDURE — 86140 C-REACTIVE PROTEIN: CPT

## 2025-06-06 ENCOUNTER — TELEPHONE (OUTPATIENT)
Dept: FAMILY MEDICINE CLINIC | Facility: CLINIC | Age: 80
End: 2025-06-06
Payer: MEDICARE

## 2025-06-06 DIAGNOSIS — R60.0 LOCALIZED EDEMA: ICD-10-CM

## 2025-06-06 DIAGNOSIS — T84.52XD INFECTION ASSOCIATED WITH INTERNAL LEFT HIP PROSTHESIS, SUBSEQUENT ENCOUNTER: ICD-10-CM

## 2025-06-06 DIAGNOSIS — I10 ESSENTIAL HYPERTENSION: Primary | ICD-10-CM

## 2025-06-06 NOTE — TELEPHONE ENCOUNTER
"  Caller: Papito Salinas \"Geoff\"    Relationship: Self    Best call back number: [phone]     What is the best time to reach you: ANYTIME     Who are you requesting to speak with (clinical staff, provider,  specific staff member): CLINICAL STAFF    What was the call regarding: PATIENT IS CALLING ABOUT HIS MEDICATIONS. HE SAYS THAT HIS POTASSIUM IS HIGH AND HE WAS TOLD BY ANOTHER DOCTOR TO STOP TAKING THE POTASSIUM. HE IS WANTING TO KNOW IF HE IS TO STOP THE FUROSEMIDE AS WELL.     Is it okay if the provider responds through MyChart: YES  "

## 2025-06-06 NOTE — TELEPHONE ENCOUNTER
Take the furosemide.  Do not take the potassium.  Needs to do weekly potassium blood tests.  Does not have to fast for these.  I will place an order.

## 2025-06-13 ENCOUNTER — LAB (OUTPATIENT)
Dept: LAB | Facility: HOSPITAL | Age: 80
End: 2025-06-13
Payer: MEDICARE

## 2025-06-13 DIAGNOSIS — T84.52XD INFECTION ASSOCIATED WITH INTERNAL LEFT HIP PROSTHESIS, SUBSEQUENT ENCOUNTER: ICD-10-CM

## 2025-06-13 DIAGNOSIS — I10 ESSENTIAL HYPERTENSION: ICD-10-CM

## 2025-06-13 DIAGNOSIS — R60.0 LOCALIZED EDEMA: ICD-10-CM

## 2025-06-13 LAB
ANION GAP SERPL CALCULATED.3IONS-SCNC: 11.5 MMOL/L (ref 5–15)
BUN SERPL-MCNC: 15 MG/DL (ref 8–23)
BUN/CREAT SERPL: 9.6 (ref 7–25)
CALCIUM SPEC-SCNC: 8.7 MG/DL (ref 8.6–10.5)
CHLORIDE SERPL-SCNC: 99 MMOL/L (ref 98–107)
CO2 SERPL-SCNC: 23.5 MMOL/L (ref 22–29)
CREAT SERPL-MCNC: 1.57 MG/DL (ref 0.76–1.27)
EGFRCR SERPLBLD CKD-EPI 2021: 44.8 ML/MIN/1.73
GLUCOSE SERPL-MCNC: 90 MG/DL (ref 65–99)
POTASSIUM SERPL-SCNC: 4.2 MMOL/L (ref 3.5–5.2)
SODIUM SERPL-SCNC: 134 MMOL/L (ref 136–145)

## 2025-06-13 PROCEDURE — 36415 COLL VENOUS BLD VENIPUNCTURE: CPT

## 2025-06-13 PROCEDURE — 80048 BASIC METABOLIC PNL TOTAL CA: CPT

## 2025-06-13 RX ORDER — FUROSEMIDE 40 MG/1
TABLET ORAL
Qty: 90 TABLET | Refills: 1 | Status: SHIPPED | OUTPATIENT
Start: 2025-06-13

## 2025-06-20 ENCOUNTER — LAB (OUTPATIENT)
Dept: LAB | Facility: HOSPITAL | Age: 80
End: 2025-06-20
Payer: MEDICARE

## 2025-06-20 DIAGNOSIS — R60.0 LOCALIZED EDEMA: ICD-10-CM

## 2025-06-20 DIAGNOSIS — I10 ESSENTIAL HYPERTENSION: ICD-10-CM

## 2025-06-20 DIAGNOSIS — T84.52XD INFECTION ASSOCIATED WITH INTERNAL LEFT HIP PROSTHESIS, SUBSEQUENT ENCOUNTER: ICD-10-CM

## 2025-06-20 LAB
ANION GAP SERPL CALCULATED.3IONS-SCNC: 10.2 MMOL/L (ref 5–15)
BUN SERPL-MCNC: 16 MG/DL (ref 8–23)
BUN/CREAT SERPL: 9.6 (ref 7–25)
CALCIUM SPEC-SCNC: 8.7 MG/DL (ref 8.6–10.5)
CHLORIDE SERPL-SCNC: 103 MMOL/L (ref 98–107)
CO2 SERPL-SCNC: 22.8 MMOL/L (ref 22–29)
CREAT SERPL-MCNC: 1.67 MG/DL (ref 0.76–1.27)
EGFRCR SERPLBLD CKD-EPI 2021: 41.6 ML/MIN/1.73
GLUCOSE SERPL-MCNC: 123 MG/DL (ref 65–99)
POTASSIUM SERPL-SCNC: 4 MMOL/L (ref 3.5–5.2)
SODIUM SERPL-SCNC: 136 MMOL/L (ref 136–145)

## 2025-06-20 PROCEDURE — 80048 BASIC METABOLIC PNL TOTAL CA: CPT

## 2025-06-20 PROCEDURE — 36415 COLL VENOUS BLD VENIPUNCTURE: CPT

## 2025-06-20 RX ORDER — METOPROLOL SUCCINATE 100 MG/1
100 TABLET, EXTENDED RELEASE ORAL DAILY
Qty: 90 TABLET | Refills: 3 | Status: SHIPPED | OUTPATIENT
Start: 2025-06-20

## 2025-06-25 ENCOUNTER — RESULTS FOLLOW-UP (OUTPATIENT)
Dept: FAMILY MEDICINE CLINIC | Facility: CLINIC | Age: 80
End: 2025-06-25
Payer: MEDICARE

## 2025-06-27 ENCOUNTER — LAB (OUTPATIENT)
Dept: LAB | Facility: HOSPITAL | Age: 80
End: 2025-06-27
Payer: MEDICARE

## 2025-06-27 DIAGNOSIS — R60.0 LOCALIZED EDEMA: ICD-10-CM

## 2025-06-27 DIAGNOSIS — T84.52XD INFECTION ASSOCIATED WITH INTERNAL LEFT HIP PROSTHESIS, SUBSEQUENT ENCOUNTER: ICD-10-CM

## 2025-06-27 DIAGNOSIS — I10 ESSENTIAL HYPERTENSION: ICD-10-CM

## 2025-06-27 LAB
ANION GAP SERPL CALCULATED.3IONS-SCNC: 10.6 MMOL/L (ref 5–15)
BUN SERPL-MCNC: 13 MG/DL (ref 8–23)
BUN/CREAT SERPL: 8.1 (ref 7–25)
CALCIUM SPEC-SCNC: 9 MG/DL (ref 8.6–10.5)
CHLORIDE SERPL-SCNC: 103 MMOL/L (ref 98–107)
CO2 SERPL-SCNC: 25.4 MMOL/L (ref 22–29)
CREAT SERPL-MCNC: 1.61 MG/DL (ref 0.76–1.27)
EGFRCR SERPLBLD CKD-EPI 2021: 43.5 ML/MIN/1.73
GLUCOSE SERPL-MCNC: 108 MG/DL (ref 65–99)
POTASSIUM SERPL-SCNC: 3.8 MMOL/L (ref 3.5–5.2)
SODIUM SERPL-SCNC: 139 MMOL/L (ref 136–145)

## 2025-06-27 PROCEDURE — 36415 COLL VENOUS BLD VENIPUNCTURE: CPT

## 2025-06-27 PROCEDURE — 80048 BASIC METABOLIC PNL TOTAL CA: CPT

## 2025-07-18 DIAGNOSIS — M10.00 IDIOPATHIC GOUT, UNSPECIFIED CHRONICITY, UNSPECIFIED SITE: ICD-10-CM

## 2025-07-18 RX ORDER — POTASSIUM CHLORIDE 750 MG/1
CAPSULE, EXTENDED RELEASE ORAL
Qty: 180 CAPSULE | Refills: 1 | Status: SHIPPED | OUTPATIENT
Start: 2025-07-18

## 2025-07-18 RX ORDER — ALLOPURINOL 300 MG/1
300 TABLET ORAL DAILY
Qty: 90 TABLET | Refills: 3 | Status: SHIPPED | OUTPATIENT
Start: 2025-07-18

## 2025-07-22 ENCOUNTER — OFFICE VISIT (OUTPATIENT)
Dept: FAMILY MEDICINE CLINIC | Facility: CLINIC | Age: 80
End: 2025-07-22
Payer: MEDICARE

## 2025-07-22 VITALS
WEIGHT: 261.2 LBS | BODY MASS INDEX: 34.62 KG/M2 | OXYGEN SATURATION: 95 % | SYSTOLIC BLOOD PRESSURE: 146 MMHG | RESPIRATION RATE: 16 BRPM | TEMPERATURE: 98.1 F | HEIGHT: 73 IN | HEART RATE: 57 BPM | DIASTOLIC BLOOD PRESSURE: 86 MMHG

## 2025-07-22 DIAGNOSIS — M10.00 IDIOPATHIC GOUT, UNSPECIFIED CHRONICITY, UNSPECIFIED SITE: ICD-10-CM

## 2025-07-22 DIAGNOSIS — G62.9 PERIPHERAL POLYNEUROPATHY: ICD-10-CM

## 2025-07-22 DIAGNOSIS — I10 ESSENTIAL HYPERTENSION: ICD-10-CM

## 2025-07-22 DIAGNOSIS — Z12.5 PROSTATE CANCER SCREENING: ICD-10-CM

## 2025-07-22 DIAGNOSIS — M47.817 LUMBOSACRAL SPONDYLOSIS WITHOUT MYELOPATHY: ICD-10-CM

## 2025-07-22 DIAGNOSIS — R60.0 LOCALIZED EDEMA: ICD-10-CM

## 2025-07-22 DIAGNOSIS — G47.00 INSOMNIA, UNSPECIFIED TYPE: ICD-10-CM

## 2025-07-22 DIAGNOSIS — Z00.00 MEDICARE ANNUAL WELLNESS VISIT, SUBSEQUENT: Primary | ICD-10-CM

## 2025-07-22 PROCEDURE — 1159F MED LIST DOCD IN RCRD: CPT | Performed by: FAMILY MEDICINE

## 2025-07-22 PROCEDURE — G0439 PPPS, SUBSEQ VISIT: HCPCS | Performed by: FAMILY MEDICINE

## 2025-07-22 PROCEDURE — 1160F RVW MEDS BY RX/DR IN RCRD: CPT | Performed by: FAMILY MEDICINE

## 2025-07-22 PROCEDURE — 3077F SYST BP >= 140 MM HG: CPT | Performed by: FAMILY MEDICINE

## 2025-07-22 PROCEDURE — 1126F AMNT PAIN NOTED NONE PRSNT: CPT | Performed by: FAMILY MEDICINE

## 2025-07-22 PROCEDURE — 3079F DIAST BP 80-89 MM HG: CPT | Performed by: FAMILY MEDICINE

## 2025-07-22 RX ORDER — FUROSEMIDE 40 MG/1
TABLET ORAL
Qty: 90 TABLET | Refills: 3 | Status: SHIPPED | OUTPATIENT
Start: 2025-07-22

## 2025-07-22 RX ORDER — HYDROCODONE BITARTRATE AND ACETAMINOPHEN 7.5; 325 MG/1; MG/1
1 TABLET ORAL EVERY 6 HOURS PRN
Qty: 60 TABLET | Refills: 0 | Status: SHIPPED | OUTPATIENT
Start: 2025-07-22

## 2025-07-22 RX ORDER — TEMAZEPAM 15 MG/1
15 CAPSULE ORAL NIGHTLY PRN
Qty: 30 CAPSULE | Refills: 5 | Status: SHIPPED | OUTPATIENT
Start: 2025-07-22

## 2025-07-22 RX ORDER — GABAPENTIN 300 MG/1
300 CAPSULE ORAL
Qty: 90 CAPSULE | Refills: 1 | Status: SHIPPED | OUTPATIENT
Start: 2025-07-22

## 2025-07-24 NOTE — PROGRESS NOTES
The ABCs of the Annual Wellness Visit  Subsequent Medicare Wellness Visit    Chief Complaint   Patient presents with    Medicare Wellness-subsequent      Subjective   History of Present Illness:  Papito Salinas is a 79 y.o. male who presents for a Subsequent Medicare Wellness Visit.  History of Present Illness  The patient presents today for his annual Medicare wellness visit, subsequent prostate cancer screening, and follow-up on chronic medical problems including gout, hypertension, edema, chronic back pain, peripheral polyneuropathy, and insomnia.    He reports experiencing fainting episodes, which have occurred 4 to 5 times in the last six months. These episodes are triggered when he gets out of the car too quickly after driving for 30 to 50 minutes. He mentions that sitting down or grabbing onto something alleviates the symptoms. He reports no issues with his heart rate but notes that his knees feel unstable during these episodes. He has discontinued valsartan and Bactrim, the latter of which was prescribed for an infection. His current medication regimen includes doxazosin, taken at night with amitriptyline, and metoprolol, taken daily.    He has been having issues with his insurance company regarding the refill of his hydrocodone and sleeping medication prescriptions. He was told that his doctor's office needs to contact his insurance company for authorization.    Due to his back condition, he has been unable to exercise or play golf for the past 2 years. His potassium supplement was discontinued due to elevated potassium levels. He continues to take furosemide, which has been effective in managing his swelling.    Social History:  Hobbies: Golf (not played in 2 years due to back condition)    The following portions of the patient's history were reviewed and   updated as appropriate: allergies, current medications, past family history, past medical history, past social history, past surgical history,  and problem list.    Compared to one year ago, the patient feels his physical   health is worse.    Compared to one year ago, the patient feels his mental   health is the same.    Recent Hospitalizations:  He was not admitted to the hospital during the last year.       Current Medical Providers:  Patient Care Team:  Dick Brewster MD as PCP - General    Outpatient Medications Prior to Visit   Medication Sig Dispense Refill    allopurinol (ZYLOPRIM) 300 MG tablet TAKE 1 TABLET BY MOUTH EVERY DAY 90 tablet 3    amitriptyline (ELAVIL) 25 MG tablet Take 1 tablet by mouth every night at bedtime. 90 tablet 3    aspirin (ASPIR) 81 MG EC tablet Take 1 tablet by mouth 2 (Two) Times a Day. (Patient taking differently: Take 1 tablet by mouth Daily.) 60 tablet 0    colchicine 0.6 MG capsule capsule Take 1 capsule by mouth Every 12 (Twelve) Hours.      linezolid (ZYVOX) 600 MG tablet Take 1 tablet by mouth 2 (Two) Times a Day. 20 tablet 0    meclizine (ANTIVERT) 25 MG tablet Take 1 tablet by mouth 3 (Three) Times a Day As Needed for Dizziness. 15 tablet 0    mesalamine (LIALDA) 1.2 g EC tablet Take 1 tablet by mouth Every 12 (Twelve) Hours.      metoprolol succinate XL (TOPROL-XL) 100 MG 24 hr tablet TAKE 1 TABLET BY MOUTH EVERY DAY 90 tablet 3    omeprazole (priLOSEC) 40 MG capsule Take 1 capsule by mouth Daily. 90 capsule 3    doxazosin (CARDURA) 4 MG tablet TAKE 1 TABLET BY MOUTH EVERYDAY AT BEDTIME 90 tablet 3    furosemide (LASIX) 40 MG tablet TAKE 1 TABLET BY MOUTH EACH MORNING AS NEEDED FOR SWELLING 90 tablet 1    gabapentin (NEURONTIN) 300 MG capsule Take 1 capsule by mouth every night at bedtime. 90 capsule 1    HYDROcodone-acetaminophen (NORCO) 7.5-325 MG per tablet Take 1 tablet by mouth Every 6 (Six) Hours As Needed for Moderate Pain. 60 tablet 0    temazepam (RESTORIL) 15 MG capsule Take 1 capsule by mouth At Night As Needed for Sleep. 30 capsule 5    potassium chloride (MICRO-K) 10 MEQ CR capsule 2  "CAPSULES BY MOUTH EACH MORNING WITH LASIX (FUROSEMIDE) (Patient not taking: Reported on 7/22/2025) 180 capsule 1     No facility-administered medications prior to visit.       Opioid medication/s are on active medication list.  and I have evaluated his active treatment plan and pain score trends (see table).  Vitals:    07/22/25 1021   PainSc: 0-No pain     I have reviewed the chart for potential of high risk medication and harmful drug interactions in the elderly.          Aspirin is on active medication list. Aspirin use is indicated based on review of current medical condition/s. Pros and cons of this therapy have been discussed today. Benefits of this medication outweigh potential harm.  Patient has been encouraged to continue taking this medication.  .      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  Patient has advanced directive at home.  Will try to remember to bring us a copy.    Review of Systems      Objective      Vitals:    07/22/25 1021   BP: 146/86   Pulse: 57   Resp: 16   Temp: 98.1 °F (36.7 °C)   SpO2: 95%   Weight: 118 kg (261 lb 3.2 oz)   Height: 185.4 cm (73\")   PainSc: 0-No pain     BMI Readings from Last 1 Encounters:   07/22/25 34.46 kg/m²   BMI is within normal parameters. No follow-up required.  BMI Readings from Last 1 Encounters:   07/22/25 34.46 kg/m²   BMI is above normal " parameters. Recommendations include: nutrition counseling    Does the patient have evidence of cognitive impairment? No    Physical Exam  Physical Exam  Respiratory: Clear to auscultation, no wheezing, rales or rhonchi  Cardiovascular: Regular rate and rhythm, no murmurs, rubs, or gallops       Results             HEALTH RISK ASSESSMENT    Smoking Status:  Social History     Tobacco Use   Smoking Status Former    Types: Cigarettes    Passive exposure: Past   Smokeless Tobacco Former    Types: Chew     Alcohol Consumption:  Social History     Substance and Sexual Activity   Alcohol Use Yes    Comment: occasional     Fall Risk Screen:    MARII Fall Risk Assessment was completed, and patient is at LOW risk for falls.Assessment completed on:2025    Depression Screenin/22/2025    10:35 AM   PHQ-2/PHQ-9 Depression Screening   Little interest or pleasure in doing things Not at all   Feeling down, depressed, or hopeless Not at all       Health Habits and Functional and Cognitive Screenin/22/2025    10:35 AM   Functional & Cognitive Status   Do you have difficulty preparing food and eating? No   Do you have difficulty bathing yourself, getting dressed or grooming yourself? No   Do you have difficulty using the toilet? No   Do you have difficulty moving around from place to place? No   Do you have trouble with steps or getting out of a bed or a chair? No   Current Diet Well Balanced Diet   Dental Exam Up to date   Eye Exam Up to date   Exercise (times per week) 0 times per week   Current Exercises Include No Regular Exercise   Do you need help using the phone?  No   Are you deaf or do you have serious difficulty hearing?  No   Do you need help to go to places out of walking distance? No   Do you need help shopping? No   Do you need help preparing meals?  No   Do you need help with housework?  No   Do you need help with laundry? No   Do you need help taking your medications? No   Do you need help  managing money? No   Do you ever drive or ride in a car without wearing a seat belt? No   Have you felt unusual fatigue (could be tiredness), stress, anger or loneliness in the last month? No   Who do you live with? Spouse   If you need help, do you have trouble finding someone available to you? No   Have you been bothered in the last four weeks by sexual problems? No   Do you have difficulty concentrating, remembering or making decisions? No       Age-appropriate Screening Schedule:  Refer to the list below for future screening recommendations based on patient's age, sex and/or medical conditions. Orders for these recommended tests are listed in the plan section. The patient has been provided with a written plan.    Health Maintenance   Topic Date Due    TDAP/TD VACCINES (1 - Tdap) Never done    RSV Vaccine - Adults (1 - 1-dose 75+ series) Never done    LIPID PANEL  01/23/2025    COVID-19 Vaccine (4 - 2024-25 season) 08/05/2025 (Originally 9/1/2024)    ZOSTER VACCINE (1 of 2) 08/05/2025 (Originally 10[birth year]10/1996)    INFLUENZA VACCINE  10/01/2025    ANNUAL WELLNESS VISIT  07/22/2026    HEPATITIS C SCREENING  Completed    Pneumococcal Vaccine 50+  Completed    COLORECTAL CANCER SCREENING  Discontinued              Assessment & Plan     CMS Preventative Services Quick Reference  Risk Factors Identified During Encounter  None Identified  The above risks/problems have been discussed with the patient.  Follow up actions/plans if indicated are seen below in the Assessment/Plan Section.  Pertinent information has been shared with the patient in the After Visit Summary.    Diagnoses and all orders for this visit:    1. Medicare annual wellness visit, subsequent (Primary)  -     CBC (No Diff); Future  -     Comprehensive Metabolic Panel; Future  -     Lipid Panel; Future    2. Idiopathic gout, unspecified chronicity, unspecified site  -     Uric Acid; Future    3. Essential hypertension  -     Comprehensive Metabolic Panel;  Future  -     Lipid Panel; Future    4. Localized edema  -     Comprehensive Metabolic Panel; Future    5. Lumbosacral spondylosis without myelopathy  -     HYDROcodone-acetaminophen (NORCO) 7.5-325 MG per tablet; Take 1 tablet by mouth Every 6 (Six) Hours As Needed for Moderate Pain.  Dispense: 60 tablet; Refill: 0  -     gabapentin (NEURONTIN) 300 MG capsule; Take 1 capsule by mouth every night at bedtime.  Dispense: 90 capsule; Refill: 1    6. Peripheral polyneuropathy  -     CBC (No Diff); Future  -     Comprehensive Metabolic Panel; Future  -     gabapentin (NEURONTIN) 300 MG capsule; Take 1 capsule by mouth every night at bedtime.  Dispense: 90 capsule; Refill: 1    7. Insomnia, unspecified type  -     temazepam (RESTORIL) 15 MG capsule; Take 1 capsule by mouth At Night As Needed for Sleep.  Dispense: 30 capsule; Refill: 5    8. Prostate cancer screening  -     PSA Screen; Future    Other orders  -     furosemide (LASIX) 40 MG tablet; TAKE 1 TABLET BY MOUTH EACH MORNING AS NEEDED FOR SWELLING  Dispense: 90 tablet; Refill: 3      Assessment & Plan  1. Orthostatic hypotension.  - Reports experiencing fainting episodes and dizziness, particularly when standing up after sitting for extended periods.  - Blood pressure today is 146/86, and pulse rate is 60, which is slightly slow but not concerning.  - Doxazosin 4 mg tablet will be discontinued as it may be contributing to his symptoms.  - Will continue taking metoprolol for blood pressure management.    2. Chronic back pain.  - Reports ongoing back pain and difficulty obtaining his hydrocodone prescription due to insurance issues.  - Nurse will contact his insurance company to resolve the issue with his hydrocodone prescription.  - Hydrocodone prescription to be sent to pharmacy.  - Pain management plan to be reviewed.    3. Insomnia.  - Reports difficulty obtaining his sleeping medication due to insurance issues.  - Nurse will contact his insurance company to  resolve the issue with his sleeping medication prescription.  - Sleeping medication prescription to be sent to pharmacy.  - Insomnia management plan to be reviewed.    4. Edema.  - Currently taking furosemide for fluid management, which has been effective.  - Potassium supplementation was discontinued due to elevated potassium levels.  - Will continue with furosemide.  - Edema management plan to be reviewed.    5. Health maintenance.  - Fasting today for blood work.  - Blood work to be done at [facility] within the next few days.  - Prostate cancer screening discussed.  - Annual Medicare wellness visit completed.    Follow-up: A follow-up appointment is scheduled for 6 months from now.    Follow Up:  Return in about 3 months (around 10/22/2025) for Recheck.     An After Visit Summary and PPPS were given to the patient.

## 2025-07-25 ENCOUNTER — LAB (OUTPATIENT)
Dept: LAB | Facility: HOSPITAL | Age: 80
End: 2025-07-25
Payer: MEDICARE

## 2025-07-25 DIAGNOSIS — Z12.5 PROSTATE CANCER SCREENING: ICD-10-CM

## 2025-07-25 DIAGNOSIS — Z00.00 MEDICARE ANNUAL WELLNESS VISIT, SUBSEQUENT: ICD-10-CM

## 2025-07-25 DIAGNOSIS — G62.9 PERIPHERAL POLYNEUROPATHY: ICD-10-CM

## 2025-07-25 DIAGNOSIS — I10 ESSENTIAL HYPERTENSION: ICD-10-CM

## 2025-07-25 DIAGNOSIS — M10.00 IDIOPATHIC GOUT, UNSPECIFIED CHRONICITY, UNSPECIFIED SITE: ICD-10-CM

## 2025-07-25 DIAGNOSIS — R60.0 LOCALIZED EDEMA: ICD-10-CM

## 2025-07-25 LAB
ALBUMIN SERPL-MCNC: 3.8 G/DL (ref 3.5–5.2)
ALBUMIN/GLOB SERPL: 1.2 G/DL
ALP SERPL-CCNC: 116 U/L (ref 39–117)
ALT SERPL W P-5'-P-CCNC: 20 U/L (ref 1–41)
ANION GAP SERPL CALCULATED.3IONS-SCNC: 12.2 MMOL/L (ref 5–15)
AST SERPL-CCNC: 30 U/L (ref 1–40)
BILIRUB SERPL-MCNC: 0.5 MG/DL (ref 0–1.2)
BUN SERPL-MCNC: 15 MG/DL (ref 8–23)
BUN/CREAT SERPL: 8.8 (ref 7–25)
CALCIUM SPEC-SCNC: 8.9 MG/DL (ref 8.6–10.5)
CHLORIDE SERPL-SCNC: 101 MMOL/L (ref 98–107)
CHOLEST SERPL-MCNC: 156 MG/DL (ref 0–200)
CO2 SERPL-SCNC: 24.8 MMOL/L (ref 22–29)
CREAT SERPL-MCNC: 1.7 MG/DL (ref 0.76–1.27)
DEPRECATED RDW RBC AUTO: 47.7 FL (ref 37–54)
EGFRCR SERPLBLD CKD-EPI 2021: 40.8 ML/MIN/1.73
ERYTHROCYTE [DISTWIDTH] IN BLOOD BY AUTOMATED COUNT: 13.9 % (ref 12.3–15.4)
GLOBULIN UR ELPH-MCNC: 3.3 GM/DL
GLUCOSE SERPL-MCNC: 93 MG/DL (ref 65–99)
HCT VFR BLD AUTO: 41 % (ref 37.5–51)
HDLC SERPL-MCNC: 30 MG/DL (ref 40–60)
HGB BLD-MCNC: 13.9 G/DL (ref 13–17.7)
LDLC SERPL CALC-MCNC: 94 MG/DL (ref 0–100)
LDLC/HDLC SERPL: 2.95 {RATIO}
MCH RBC QN AUTO: 32.4 PG (ref 26.6–33)
MCHC RBC AUTO-ENTMCNC: 33.9 G/DL (ref 31.5–35.7)
MCV RBC AUTO: 95.6 FL (ref 79–97)
PLATELET # BLD AUTO: 141 10*3/MM3 (ref 140–450)
PMV BLD AUTO: 10.5 FL (ref 6–12)
POTASSIUM SERPL-SCNC: 4.4 MMOL/L (ref 3.5–5.2)
PROT SERPL-MCNC: 7.1 G/DL (ref 6–8.5)
PSA SERPL-MCNC: 0.56 NG/ML (ref 0–4)
RBC # BLD AUTO: 4.29 10*6/MM3 (ref 4.14–5.8)
SODIUM SERPL-SCNC: 138 MMOL/L (ref 136–145)
TRIGL SERPL-MCNC: 187 MG/DL (ref 0–150)
URATE SERPL-MCNC: 4.4 MG/DL (ref 3.4–7)
VLDLC SERPL-MCNC: 32 MG/DL (ref 5–40)
WBC NRBC COR # BLD AUTO: 4.53 10*3/MM3 (ref 3.4–10.8)

## 2025-07-25 PROCEDURE — 85027 COMPLETE CBC AUTOMATED: CPT

## 2025-07-25 PROCEDURE — G0103 PSA SCREENING: HCPCS

## 2025-07-25 PROCEDURE — 80061 LIPID PANEL: CPT

## 2025-07-25 PROCEDURE — 36415 COLL VENOUS BLD VENIPUNCTURE: CPT

## 2025-07-25 PROCEDURE — 84550 ASSAY OF BLOOD/URIC ACID: CPT

## 2025-07-25 PROCEDURE — 80053 COMPREHEN METABOLIC PANEL: CPT

## 2025-08-06 ENCOUNTER — RESULTS FOLLOW-UP (OUTPATIENT)
Dept: FAMILY MEDICINE CLINIC | Facility: CLINIC | Age: 80
End: 2025-08-06
Payer: MEDICARE

## (undated) DEVICE — TAPE,CLOTH/SILK,CURAD,3"X10YD,LF,40/CS: Brand: CURAD

## (undated) DEVICE — PATIENT RETURN ELECTRODE, SINGLE-USE, CONTACT QUALITY MONITORING, ADULT, WITH 9FT CORD, FOR PATIENTS WEIGING OVER 33LBS. (15KG): Brand: MEGADYNE

## (undated) DEVICE — DRAPE,REIN 53X77,STERILE: Brand: MEDLINE

## (undated) DEVICE — TRY EPID SFTY 18G 3.5IN 1T7680

## (undated) DEVICE — 3M™ MEDIPORE™ H SOFT CLOTH SURGICAL TAPE, 2863, 3 IN X 10 YD, 12/CASE: Brand: 3M™ MEDIPORE™

## (undated) DEVICE — ANTIBACTERIAL UNDYED BRAIDED (POLYGLACTIN 910), SYNTHETIC ABSORBABLE SUTURE: Brand: COATED VICRYL

## (undated) DEVICE — ELECTRD BLD EZ CLN STD 4IN

## (undated) DEVICE — SYS CLS SKIN SURG PREMIERPRO EXOFINFUSION W/PTCH 30CM

## (undated) DEVICE — DRAPE,T,LIMB,BILATERAL,STERILE: Brand: MEDLINE

## (undated) DEVICE — TOWEL,OR,DSP,ST,BLUE,STD,4/PK,20PK/CS: Brand: MEDLINE

## (undated) DEVICE — SPNG GZ WOVN 4X4IN 12PLY 10/BX STRL

## (undated) DEVICE — PK HIP TOTL UNIV 10

## (undated) DEVICE — STOCKINETTE,IMPERVIOUS,12X48,STERILE: Brand: MEDLINE

## (undated) DEVICE — GLOVE,SURG,SENSICARE,ALOE,LF,PF,9: Brand: MEDLINE

## (undated) DEVICE — BLANKT WARM UPPR/BDY ARM/OUT 57X196CM

## (undated) DEVICE — GLV SURG PREMIERPRO MIC LTX PF SZ8.5 BRN

## (undated) DEVICE — 3M™ STERI-DRAPE™ INSTRUMENT POUCH 1018: Brand: STERI-DRAPE™

## (undated) DEVICE — TRAP FLD MINIVAC MEGADYNE 100ML

## (undated) DEVICE — TBG PENCL TELESCP MEGADYNE SMOKE EVAC 10FT

## (undated) DEVICE — PREVENA INCISION MANAGEMENT SYSTEM- PEEL & PLACE DRESSING: Brand: PREVENA™ PEEL & PLACE™

## (undated) DEVICE — BNDG ELAS CO-FLEX SLF ADHR 6IN 5YD LF STRL

## (undated) DEVICE — STRYKER PERFORMANCE SERIES SAGITTAL BLADE: Brand: STRYKER PERFORMANCE SERIES

## (undated) DEVICE — C-ARM DRAPE: Brand: DEROYAL

## (undated) DEVICE — DRAPE,TOP,102X53,STERILE: Brand: MEDLINE

## (undated) DEVICE — C-ARM: Brand: UNBRANDED

## (undated) DEVICE — SUT MONOCRYL PLS ANTIB UND 3/0  PS1 27IN

## (undated) DEVICE — SYS CLS SKIN PREMIERPRO EXOFINFUSION 22CM